# Patient Record
Sex: FEMALE | Race: WHITE | NOT HISPANIC OR LATINO | Employment: UNEMPLOYED | URBAN - NONMETROPOLITAN AREA
[De-identification: names, ages, dates, MRNs, and addresses within clinical notes are randomized per-mention and may not be internally consistent; named-entity substitution may affect disease eponyms.]

---

## 2017-01-20 ENCOUNTER — TELEPHONE (OUTPATIENT)
Dept: NEUROLOGY | Facility: CLINIC | Age: 37
End: 2017-01-20

## 2017-01-20 RX ORDER — TOPIRAMATE 50 MG/1
CAPSULE, EXTENDED RELEASE ORAL
Qty: 30 CAPSULE | Refills: 2 | OUTPATIENT
Start: 2017-01-20

## 2017-01-20 RX ORDER — TOPIRAMATE 100 MG/1
100 CAPSULE, EXTENDED RELEASE ORAL DAILY
Qty: 30 CAPSULE | Refills: 3 | Status: SHIPPED | OUTPATIENT
Start: 2017-01-20 | End: 2017-01-23 | Stop reason: DRUGHIGH

## 2017-01-20 NOTE — TELEPHONE ENCOUNTER
----- Message from MASSIEL Garcia sent at 1/20/2017 10:24 AM EST -----  Please verify is pt using trokendi 100mg or 50mg. The new rx states 50mg but chart states 100mg.  Thank you

## 2017-01-23 ENCOUNTER — PROCEDURE VISIT (OUTPATIENT)
Dept: NEUROLOGY | Facility: CLINIC | Age: 37
End: 2017-01-23

## 2017-01-23 VITALS
BODY MASS INDEX: 30.39 KG/M2 | WEIGHT: 178 LBS | SYSTOLIC BLOOD PRESSURE: 106 MMHG | HEIGHT: 64 IN | DIASTOLIC BLOOD PRESSURE: 90 MMHG

## 2017-01-23 DIAGNOSIS — G43.011 INTRACTABLE MIGRAINE WITHOUT AURA AND WITH STATUS MIGRAINOSUS: Primary | ICD-10-CM

## 2017-01-23 PROCEDURE — 64615 CHEMODENERV MUSC MIGRAINE: CPT | Performed by: PSYCHIATRY & NEUROLOGY

## 2017-01-23 RX ORDER — TOPIRAMATE 50 MG/1
CAPSULE, EXTENDED RELEASE ORAL
Qty: 30 CAPSULE | Refills: 2 | Status: SHIPPED | OUTPATIENT
Start: 2017-01-23 | End: 2017-04-07 | Stop reason: SDUPTHER

## 2017-01-23 NOTE — PROGRESS NOTES
Procedure note    Procedure: Botox injection    Indication: Prior to Botox injection patients suffer from 28 day a month of headache.  Her pain was 10/10.      Procedure:  Patient has over 15 migraines a month over 4 hours duration interfering with the patient daily activities despite conservative medical treatment for acute and preventive measures.    The risk and benefit of the Botox treatment has been discussed with patient.  Safety information and contraindication of Botox has been reviewed with patient.  The most frequent Adverse reactions of Botox for migraine include but not limited to neck pain 9%, headache 5%, Ptosis 4%, muscle weakness 4%, injection site pain 3%, muscle spasms 2% have been gone over with our patient.  200 unit vial Botox was re-constituted with 4 mL 0.9 NS to 5 unit/0.1 mL using standard techniques.  10 units were given at the  muscle in 2 divided sites  5 units were given at the Procerus muscle  20 units were given at the Frontalis muscle in 4 divided sites  40 units were given at the Temporalis muscle in 8 divided sites  30 units were given at the Occipitalis muscle in 6 divided sites  20 units were given at the cervical paraspinal muscle in 4 divided sites  30 units were given at the Trapezius muscle in 6 divided sites  For a total of 155 units divided between 31 sites and 45 units of wastage  Patient tolerated procedure well without complication.      Esa Miller MD, FAAN  01/23/2017

## 2017-01-23 NOTE — MR AVS SNAPSHOT
Ange Wade   2017 10:30 AM   Procedure visit    Dept Phone:  [phone]   Encounter #:  [id number]    Provider:  Esa Miller MD, FAAN   Department:  [facility]                Your Full Care Plan              Today's Medication Changes          These changes are accurate as of: 17 11:08 AM.  If you have any questions, ask your nurse or doctor.               Medication(s)that have changed:     amitriptyline 100 MG tablet   Commonly known as:  ELAVIL   Take 100 mg by mouth Every Night. Take 1 at supper time   What changed:  Another medication with the same name was removed. Continue taking this medication, and follow the directions you see here.                  Your Updated Medication List          This list is accurate as of: 17 11:08 AM.  Always use your most recent med list.                amitriptyline 100 MG tablet   Commonly known as:  ELAVIL       MethylPREDNISolone 4 MG tablet   Commonly known as:  MEDROL (BAR)   Take as directed on package instructions.       SUMAtriptan 100 MG tablet   Commonly known as:  IMITREX   Take 1 tablet by mouth 1 (One) Time As Needed for migraine for up to 1 dose.       Topiramate  MG capsule sustained-release 24 hr   Commonly known as:  TROKENDI XR   Take 100 mg by mouth Daily.               Instructions     None    Patient Instructions History      Upcoming Appointments     Visit Type Date Time Department    IN OFFICE PROCEDURE 2017 10:30 AM [facility]      [employer] Signup     [facility] [employer] allows you to send messages to your doctor, view your test results, renew your prescriptions, schedule appointments, and more. To sign up, go to [employer] and click on the Sign Up Now link in the New User? box. Enter your [employer] Activation Code exactly as it appears below along with the last four digits of your Social Security Number and your Date of Birth () to  "complete the sign-up process. If you do not sign up before the expiration date, you must request a new code.    [employer] Activation Code: [email or web address]  Expires: 1/30/2017  5:32 AM    If you have questions, you can email [email or web address] or call [phone] to talk to our [employer] staff. Remember, [employer] is NOT to be used for urgent needs. For medical emergencies, dial 911.               Other Info from Your Visit           Allergies     Darvon [Propoxyphene]  Hives    darvocet      Reason for Visit     Botulinum Toxin Injection reports no reduction in migraines this time. states that she continues to have lt eye throbbing and pain with migraine. reports that frequency, intensitity and duration are all unchanged.    Med Management states that the paranoia and hallucinations continue. states that she would like to decrease trokendi to 50mg qd to see if this will help.decreasing amitrip did decrease the hallucinations.      Vital Signs     Blood Pressure Height Weight Body Mass Index Smoking Status       106/90 64\" (162.6 cm) 178 lb (80.7 kg) 30.55 kg/m2 Former Smoker         "

## 2017-02-22 DIAGNOSIS — G43.001 MIGRAINE WITHOUT AURA AND WITH STATUS MIGRAINOSUS, NOT INTRACTABLE: ICD-10-CM

## 2017-02-22 RX ORDER — AMITRIPTYLINE HYDROCHLORIDE 100 MG/1
TABLET, FILM COATED ORAL
Qty: 60 TABLET | Refills: 3 | Status: SHIPPED | OUTPATIENT
Start: 2017-02-22 | End: 2017-06-10 | Stop reason: SDUPTHER

## 2017-03-01 ENCOUNTER — OFFICE VISIT (OUTPATIENT)
Dept: OBSTETRICS AND GYNECOLOGY | Facility: CLINIC | Age: 37
End: 2017-03-01

## 2017-03-01 VITALS
BODY MASS INDEX: 30.9 KG/M2 | HEIGHT: 64 IN | SYSTOLIC BLOOD PRESSURE: 119 MMHG | WEIGHT: 181 LBS | DIASTOLIC BLOOD PRESSURE: 62 MMHG

## 2017-03-01 DIAGNOSIS — N94.10 DYSPAREUNIA IN FEMALE: ICD-10-CM

## 2017-03-01 DIAGNOSIS — N83.201 CYST OF RIGHT OVARY: ICD-10-CM

## 2017-03-01 DIAGNOSIS — R10.31 ABDOMINAL PAIN, RIGHT LOWER QUADRANT: Primary | ICD-10-CM

## 2017-03-01 LAB
COLOR UR: YELLOW
GLUCOSE UR STRIP-MCNC: NEGATIVE MG/DL
LEUKOCYTE EST, POC: NEGATIVE
NITRITE UR-MCNC: NEGATIVE MG/ML
PROT UR STRIP-MCNC: NEGATIVE MG/DL
RBC # UR STRIP: NEGATIVE /UL

## 2017-03-01 PROCEDURE — 99213 OFFICE O/P EST LOW 20 MIN: CPT | Performed by: OBSTETRICS & GYNECOLOGY

## 2017-03-01 PROCEDURE — 76830 TRANSVAGINAL US NON-OB: CPT | Performed by: OBSTETRICS & GYNECOLOGY

## 2017-03-01 PROCEDURE — 81002 URINALYSIS NONAUTO W/O SCOPE: CPT | Performed by: OBSTETRICS & GYNECOLOGY

## 2017-03-01 RX ORDER — SUMATRIPTAN 100 MG/1
100 TABLET, FILM COATED ORAL
COMMUNITY
End: 2017-04-12 | Stop reason: SDUPTHER

## 2017-03-01 NOTE — PROGRESS NOTES
Subjective  Chief Complaint   Patient presents with   • painful intercourse   • Pelvic Pain     pain in right side x 1 week     Patient is 36 y.o.  here for evaluation of right lower quadrant pain.  Pt reports onset of pain with intercourse and has cont to have pain.  Pt reports sharp stabbing pain.  Pt reports not improving in approx 1 week.  Pt has cont to have dyspareunia as well.  Pt with nausea but only with migraines.  Pt with no emesis.  Pt denies any changes with bms.  Pt with no fever or chills.  Pt with no vaginal discharge, itching or burning.  Pt had LAVH 2016 and still has both ovaries.  Pt has some hot flashes but no other symptoms.    History  Past Medical History   Diagnosis Date   • History of blood clots      after c section    • History of CT scan    • History of MRI    • Migraine    • Vasovagal syncope      Current Outpatient Prescriptions on File Prior to Visit   Medication Sig Dispense Refill   • amitriptyline (ELAVIL) 100 MG tablet TAKE 2 TABLETS BY MOUTH AT BEDTIME  60 tablet 3   • TROKENDI XR 50 MG capsule sustained-release 24 hr TAKE 1 CAPSULE BY MOUTH ONE TIME A DAY  30 capsule 2   • MethylPREDNISolone (MEDROL, BAR,) 4 MG tablet Take as directed on package instructions. 1 each 0   • [DISCONTINUED] amitriptyline (ELAVIL) 100 MG tablet Take 100 mg by mouth Every Night. Take 1 at supper time     • [DISCONTINUED] SUMAtriptan (IMITREX) 100 MG tablet Take 1 tablet by mouth 1 (One) Time As Needed for migraine for up to 1 dose. 15 tablet 3     No current facility-administered medications on file prior to visit.      Allergies   Allergen Reactions   • Darvon [Propoxyphene] Hives     darvocet     Past Surgical History   Procedure Laterality Date   •  section     •  section     •  section     • Toe surgery     • Mouth surgery     • Tubal abdominal ligation     • Hysterectomy  2016     LUPE   • Bladder repair  2016     with hysterectomy  "    Family History   Problem Relation Age of Onset   • Mental illness Other    • Cancer Other    • Diabetes Other    • Hypertension Other    • Heart disease Other    • Stroke Other    • Breast cancer Maternal Grandmother    • Breast cancer Maternal Aunt      Social History     Social History   • Marital status:      Spouse name: N/A   • Number of children: N/A   • Years of education: N/A     Social History Main Topics   • Smoking status: Former Smoker   • Smokeless tobacco: Never Used   • Alcohol use No   • Drug use: No   • Sexual activity: Yes     Partners: Male     Birth control/ protection: Surgical     Other Topics Concern   • None     Social History Narrative     Review of Systems  All systems were reviewed and negative except for:  Constitution:  positive for chills, trouble sleeping and hot flashes  Genitourinary: postivie for  See HPI    Objective  Vitals:    03/01/17 1403   BP: 119/62   Weight: 181 lb (82.1 kg)   Height: 64\" (162.6 cm)     Physical Exam:  General Appearance: alert, appears stated age and cooperative  Head: normocephalic, without obvious abnormality and atraumatic  Eyes: lids and lashes normal, conjunctivae and sclerae normal, no icterus, no pallor and corneas clear  Ears: ears appear intact with no abnormalities noted  Neck: suppple, trachea midline and no thyromegaly  Lungs: clear to auscultation, respirations regular, respirations even and respirations unlabored  Heart: regular rhythm and normal rate, normal S1, S2, no murmur, gallop, or rubs and no click  Abdomen: normal bowel sounds, no masses, no hepatomegaly, no splenomegaly, soft non-tender, no guarding and no rebound tenderness  Pelvic: Clinical staff was present for exam  External genitalia:  normal appearance of the external genitalia including Bartholin's and [place]'s glands.  :  urethral meatus normal; urethral hypermobility is absent.  Vaginal:  normal pink mucosa without prolapse or lesions.  Cervix:  absent.  Uterus:  " absent.  Adnexa:  +tenderness right adnexal area  Extremities: moves extremities well, no edema, no cyanosis and no redness  Skin: no bleeding, bruising or rash and no leasions noted  Psych: normal mood and affect, oriented to person, time and place, thought content organized and appropriate judgment    Lab Review   UA - negative    Imaging   Pelvic ultrasound images independantly reviewed - right ovary with multiple cysts; largest 4 cm; no free fluid; normal doppler flow.    Assessment/Plan    Problem List Items Addressed This Visit     None      Visit Diagnoses     Abdominal pain, right lower quadrant    -  Primary    Relevant Orders    US Non-ob Transvaginal  Right multicystic ovary; instructions and precautions given.  Pelvic rest.  Plan repeat scan in 6 wks.    POC Urinalysis Dipstick (Completed)    Dyspareunia in female        Relevant Orders    US Non-ob Transvaginal    POC Urinalysis Dipstick (Completed)          Follow up 6 weeks for annual exam and for ultrasound    This note was electronically signed.  Jyothi Ramos M.D.

## 2017-03-16 DIAGNOSIS — G43.001 MIGRAINE WITHOUT AURA AND WITH STATUS MIGRAINOSUS, NOT INTRACTABLE: ICD-10-CM

## 2017-03-16 RX ORDER — SUMATRIPTAN 100 MG/1
TABLET, FILM COATED ORAL
Qty: 15 TABLET | Refills: 2 | Status: SHIPPED | OUTPATIENT
Start: 2017-03-16 | End: 2017-05-12 | Stop reason: SDUPTHER

## 2017-03-26 ENCOUNTER — HOSPITAL ENCOUNTER (EMERGENCY)
Facility: HOSPITAL | Age: 37
Discharge: HOME | End: 2017-03-26
Attending: EMERGENCY MEDICINE | Admitting: EMERGENCY MEDICINE

## 2017-03-26 ENCOUNTER — APPOINTMENT (OUTPATIENT)
Dept: CT IMAGING | Facility: HOSPITAL | Age: 37
End: 2017-03-26

## 2017-03-26 VITALS
HEART RATE: 58 BPM | WEIGHT: 181 LBS | HEIGHT: 63 IN | SYSTOLIC BLOOD PRESSURE: 101 MMHG | OXYGEN SATURATION: 100 % | RESPIRATION RATE: 16 BRPM | DIASTOLIC BLOOD PRESSURE: 76 MMHG | TEMPERATURE: 98 F | BODY MASS INDEX: 32.07 KG/M2

## 2017-03-26 DIAGNOSIS — R10.30 LOWER ABDOMINAL PAIN: Primary | ICD-10-CM

## 2017-03-26 LAB
ALBUMIN SERPL-MCNC: 4.1 G/DL (ref 3.5–5)
ALBUMIN/GLOB SERPL: 1.1 G/DL (ref 1–2)
ALP SERPL-CCNC: 66 U/L (ref 38–126)
ALT SERPL W P-5'-P-CCNC: 29 U/L (ref 13–69)
ANION GAP SERPL CALCULATED.3IONS-SCNC: 18.2 MMOL/L
AST SERPL-CCNC: 21 U/L (ref 15–46)
BASOPHILS # BLD AUTO: 0.05 10*3/MM3 (ref 0–0.2)
BASOPHILS NFR BLD AUTO: 0.8 % (ref 0–2.5)
BILIRUB SERPL-MCNC: 0.6 MG/DL (ref 0.2–1.3)
BILIRUB UR QL STRIP: NEGATIVE
BUN BLD-MCNC: 12 MG/DL (ref 7–20)
BUN/CREAT SERPL: 20 (ref 7.1–23.5)
CALCIUM SPEC-SCNC: 8.8 MG/DL (ref 8.4–10.2)
CHLORIDE SERPL-SCNC: 105 MMOL/L (ref 98–107)
CLARITY UR: CLEAR
CO2 SERPL-SCNC: 24 MMOL/L (ref 26–30)
COLOR UR: YELLOW
CREAT BLD-MCNC: 0.6 MG/DL (ref 0.6–1.3)
DEPRECATED RDW RBC AUTO: 41.1 FL (ref 37–54)
EOSINOPHIL # BLD AUTO: 0.08 10*3/MM3 (ref 0–0.7)
EOSINOPHIL NFR BLD AUTO: 1.3 % (ref 0–7)
ERYTHROCYTE [DISTWIDTH] IN BLOOD BY AUTOMATED COUNT: 14.1 % (ref 11.5–14.5)
GFR SERPL CREATININE-BSD FRML MDRD: 113 ML/MIN/1.73
GLOBULIN UR ELPH-MCNC: 3.7 GM/DL
GLUCOSE BLD-MCNC: 88 MG/DL (ref 74–98)
GLUCOSE UR STRIP-MCNC: NEGATIVE MG/DL
HCT VFR BLD AUTO: 41.4 % (ref 37–47)
HGB BLD-MCNC: 13.5 G/DL (ref 12–16)
HGB UR QL STRIP.AUTO: NEGATIVE
HOLD SPECIMEN: NORMAL
HOLD SPECIMEN: NORMAL
IMM GRANULOCYTES # BLD: 0.02 10*3/MM3 (ref 0–0.06)
IMM GRANULOCYTES NFR BLD: 0.3 % (ref 0–0.6)
KETONES UR QL STRIP: NEGATIVE
LEUKOCYTE ESTERASE UR QL STRIP.AUTO: NEGATIVE
LIPASE SERPL-CCNC: 87 U/L (ref 23–300)
LYMPHOCYTES # BLD AUTO: 1.95 10*3/MM3 (ref 0.6–3.4)
LYMPHOCYTES NFR BLD AUTO: 30.9 % (ref 10–50)
MCH RBC QN AUTO: 26.6 PG (ref 27–31)
MCHC RBC AUTO-ENTMCNC: 32.6 G/DL (ref 30–37)
MCV RBC AUTO: 81.7 FL (ref 81–99)
MONOCYTES # BLD AUTO: 0.4 10*3/MM3 (ref 0–0.9)
MONOCYTES NFR BLD AUTO: 6.3 % (ref 0–12)
NEUTROPHILS # BLD AUTO: 3.81 10*3/MM3 (ref 2–6.9)
NEUTROPHILS NFR BLD AUTO: 60.4 % (ref 37–80)
NITRITE UR QL STRIP: NEGATIVE
NRBC BLD MANUAL-RTO: 0 /100 WBC (ref 0–0)
PH UR STRIP.AUTO: 7 [PH] (ref 5–8)
PLATELET # BLD AUTO: 173 10*3/MM3 (ref 130–400)
PMV BLD AUTO: 11.9 FL (ref 6–12)
POTASSIUM BLD-SCNC: 4.2 MMOL/L (ref 3.5–5.1)
PROT SERPL-MCNC: 7.8 G/DL (ref 6.3–8.2)
PROT UR QL STRIP: NEGATIVE
RBC # BLD AUTO: 5.07 10*6/MM3 (ref 4.2–5.4)
SODIUM BLD-SCNC: 143 MMOL/L (ref 137–145)
SP GR UR STRIP: 1.01 (ref 1–1.03)
UROBILINOGEN UR QL STRIP: NORMAL
WBC NRBC COR # BLD: 6.31 10*3/MM3 (ref 4.8–10.8)
WHOLE BLOOD HOLD SPECIMEN: NORMAL
WHOLE BLOOD HOLD SPECIMEN: NORMAL

## 2017-03-26 PROCEDURE — 99283 EMERGENCY DEPT VISIT LOW MDM: CPT

## 2017-03-26 PROCEDURE — 74176 CT ABD & PELVIS W/O CONTRAST: CPT

## 2017-03-26 PROCEDURE — 96361 HYDRATE IV INFUSION ADD-ON: CPT

## 2017-03-26 PROCEDURE — 85025 COMPLETE CBC W/AUTO DIFF WBC: CPT | Performed by: EMERGENCY MEDICINE

## 2017-03-26 PROCEDURE — 80053 COMPREHEN METABOLIC PANEL: CPT | Performed by: EMERGENCY MEDICINE

## 2017-03-26 PROCEDURE — 96374 THER/PROPH/DIAG INJ IV PUSH: CPT

## 2017-03-26 PROCEDURE — 25010000002 KETOROLAC TROMETHAMINE PER 15 MG: Performed by: PHYSICIAN ASSISTANT

## 2017-03-26 PROCEDURE — 81003 URINALYSIS AUTO W/O SCOPE: CPT | Performed by: EMERGENCY MEDICINE

## 2017-03-26 PROCEDURE — 83690 ASSAY OF LIPASE: CPT | Performed by: EMERGENCY MEDICINE

## 2017-03-26 RX ORDER — HYDROCODONE BITARTRATE AND ACETAMINOPHEN 5; 325 MG/1; MG/1
1 TABLET ORAL EVERY 6 HOURS PRN
Qty: 12 TABLET | Refills: 0 | Status: SHIPPED | OUTPATIENT
Start: 2017-03-26 | End: 2017-04-29 | Stop reason: HOSPADM

## 2017-03-26 RX ORDER — KETOROLAC TROMETHAMINE 30 MG/ML
30 INJECTION, SOLUTION INTRAMUSCULAR; INTRAVENOUS ONCE
Status: COMPLETED | OUTPATIENT
Start: 2017-03-26 | End: 2017-03-26

## 2017-03-26 RX ORDER — IBUPROFEN 600 MG/1
600 TABLET ORAL ONCE
Status: DISCONTINUED | OUTPATIENT
Start: 2017-03-26 | End: 2017-03-26

## 2017-03-26 RX ORDER — IBUPROFEN 600 MG/1
600 TABLET ORAL EVERY 8 HOURS PRN
Qty: 12 TABLET | Refills: 0 | Status: SHIPPED | OUTPATIENT
Start: 2017-03-26 | End: 2017-04-29 | Stop reason: HOSPADM

## 2017-03-26 RX ORDER — SODIUM CHLORIDE 0.9 % (FLUSH) 0.9 %
10 SYRINGE (ML) INJECTION AS NEEDED
Status: DISCONTINUED | OUTPATIENT
Start: 2017-03-26 | End: 2017-03-26 | Stop reason: HOSPADM

## 2017-03-26 RX ORDER — ONDANSETRON 4 MG/1
4 TABLET, FILM COATED ORAL EVERY 6 HOURS
Qty: 8 TABLET | Refills: 0 | Status: SHIPPED | OUTPATIENT
Start: 2017-03-26 | End: 2017-04-17

## 2017-03-26 RX ORDER — ACETAMINOPHEN 325 MG/1
650 TABLET ORAL ONCE
Status: COMPLETED | OUTPATIENT
Start: 2017-03-26 | End: 2017-03-26

## 2017-03-26 RX ORDER — ONDANSETRON 4 MG/1
4 TABLET, ORALLY DISINTEGRATING ORAL ONCE
Status: COMPLETED | OUTPATIENT
Start: 2017-03-26 | End: 2017-03-26

## 2017-03-26 RX ADMIN — KETOROLAC TROMETHAMINE 30 MG: 30 INJECTION, SOLUTION INTRAMUSCULAR at 11:10

## 2017-03-26 RX ADMIN — SODIUM CHLORIDE 500 ML: 9 INJECTION, SOLUTION INTRAVENOUS at 11:10

## 2017-03-26 RX ADMIN — ACETAMINOPHEN 650 MG: 325 TABLET, FILM COATED ORAL at 11:11

## 2017-03-26 RX ADMIN — ONDANSETRON 4 MG: 4 TABLET, ORALLY DISINTEGRATING ORAL at 11:10

## 2017-03-26 NOTE — ED PROVIDER NOTES
Subjective   HPI Comments: Patient assures complaint of right flank pain symptoms increased for the past month no fever chills , she describes nausea vomiting ×2 this morning no chest pain shortness of air patient is had hysterectomy patient is been told she has ovarian cysts in the past uncertain if this is what is causing pain presents here for further evaluation      Review of Systems   Constitutional: Negative.  Negative for fever.   HENT: Negative.    Respiratory: Negative.    Gastrointestinal: Positive for nausea. Vomiting: vomit ×2.   Genitourinary: Positive for flank pain.   Musculoskeletal: Positive for back pain. Negative for arthralgias.   Skin: Negative.  Negative for rash.   Neurological: Negative.  Negative for numbness.   Hematological: Negative.    Psychiatric/Behavioral: Negative.    All other systems reviewed and are negative.      Past Medical History:   Diagnosis Date   • History of blood clots     after c section    • History of CT scan    • History of MRI    • Migraine    • Vasovagal syncope        Allergies   Allergen Reactions   • Darvon [Propoxyphene] Hives     darvocet       Past Surgical History:   Procedure Laterality Date   • BLADDER REPAIR      with hysterectomy   •  SECTION     •  SECTION     •  SECTION     • HYSTERECTOMY  2016    LUPE   • MOUTH SURGERY     • TOE SURGERY     • TUBAL ABDOMINAL LIGATION         Family History   Problem Relation Age of Onset   • Mental illness Other    • Cancer Other    • Diabetes Other    • Hypertension Other    • Heart disease Other    • Stroke Other    • Breast cancer Maternal Grandmother    • Breast cancer Maternal Aunt        Social History     Social History   • Marital status:      Spouse name: N/A   • Number of children: N/A   • Years of education: N/A     Social History Main Topics   • Smoking status: Former Smoker   • Smokeless tobacco: Never Used   • Alcohol use No   • Drug use: No    • Sexual activity: Yes     Partners: Male     Birth control/ protection: Surgical     Other Topics Concern   • None     Social History Narrative           Objective   Physical Exam   Constitutional: She is oriented to person, place, and time. She appears well-developed and well-nourished.   Afebrile vital signs stable nontoxic well-appearing  ambulatory in the exam room   HENT:   Head: Normocephalic.   Eyes: EOM are normal. Pupils are equal, round, and reactive to light.   Neck: Normal range of motion. Neck supple.   Cardiovascular: Normal rate and regular rhythm.    Pulmonary/Chest: Effort normal and breath sounds normal.   Abdominal: Soft. She exhibits no distension. There is no rebound and no guarding.   Mild right CVA tenderness   Musculoskeletal: Normal range of motion.   Neurological: She is alert and oriented to person, place, and time.   Skin: Skin is warm and dry. No rash noted.   Psychiatric: She has a normal mood and affect. Her behavior is normal. Judgment and thought content normal.   Nursing note and vitals reviewed.      Procedures         ED Course  ED Course   Comment By Time   Patient resting comfortably no distress Walker Ashley PA-C 03/26 1228   Patient sitting in room no acute distress discussed with patient CT results and labs patient does have follow-up with Dr. Jyothi Ramos advised to try to call tomorrow to work in sooner, certainly return here if she has any sudden changes worsening pain in the next 24 hours Walker Ashley PA-C 03/26 1231                  MDM    Final diagnoses:   Lower abdominal pain            Walker Ashley PA-C  03/26/17 1235

## 2017-04-07 RX ORDER — TOPIRAMATE 50 MG/1
50 CAPSULE, EXTENDED RELEASE ORAL DAILY
Qty: 30 CAPSULE | Refills: 4 | Status: SHIPPED | OUTPATIENT
Start: 2017-04-07 | End: 2017-07-24 | Stop reason: SDUPTHER

## 2017-04-12 ENCOUNTER — OFFICE VISIT (OUTPATIENT)
Dept: OBSTETRICS AND GYNECOLOGY | Facility: CLINIC | Age: 37
End: 2017-04-12

## 2017-04-12 VITALS
DIASTOLIC BLOOD PRESSURE: 58 MMHG | BODY MASS INDEX: 32.43 KG/M2 | HEIGHT: 63 IN | SYSTOLIC BLOOD PRESSURE: 114 MMHG | WEIGHT: 183 LBS

## 2017-04-12 DIAGNOSIS — N83.201 RIGHT OVARIAN CYST: Primary | ICD-10-CM

## 2017-04-12 DIAGNOSIS — R10.2 PELVIC PAIN: ICD-10-CM

## 2017-04-12 PROCEDURE — 99214 OFFICE O/P EST MOD 30 MIN: CPT | Performed by: OBSTETRICS & GYNECOLOGY

## 2017-04-12 PROCEDURE — 76830 TRANSVAGINAL US NON-OB: CPT | Performed by: OBSTETRICS & GYNECOLOGY

## 2017-04-12 RX ORDER — PHENAZOPYRIDINE HYDROCHLORIDE 100 MG/1
200 TABLET, FILM COATED ORAL ONCE
Status: CANCELLED | OUTPATIENT
Start: 2017-04-12 | End: 2017-04-12

## 2017-04-12 RX ORDER — SODIUM CHLORIDE 0.9 % (FLUSH) 0.9 %
1-10 SYRINGE (ML) INJECTION AS NEEDED
Status: CANCELLED | OUTPATIENT
Start: 2017-04-12

## 2017-04-13 NOTE — PROGRESS NOTES
Subjective  Chief Complaint   Patient presents with   • Follow-up     transvaginal ultrasound, Right lower quadrant pain     Patient is 36 y.o.  here for evaluation of cont right lower quadrant pain and f/u TVS.  Pt previously seen and TVS performed 3/1/2017 showing right ovary with multiple cysts; largest 4 cm; and 2 small simple cysts of left ovary.  Pt reports pain cont and worsened to the point of going to the ER on 3/26; records reviewed as well as labs and CT scan; WBC 6.31; cmp normal; lipase normal; UA negative; CT scan of abd/pelvis showing no hydronephrosis or nephrolithiasis; no acute process; no mention of ovarian cyst.  Pt reports pain has cont to worsen since then.  Pt denies any nausea, emesis, change in bm's or voiding.  Pt with no fever or chills.  Pt with history of adhesive disease.  Pt had previous LUPE with incidental cystotomy.  Pt desires removal of ovaries and tubes but is insistent upon laparotomy rather than laparoscopy.    History  Past Medical History:   Diagnosis Date   • Abnormal ECG ?    [facility]   • Abnormal Pap smear of cervix     Precancerous cells   • Anemia    • History of blood clots     after c section    • History of CT scan    • History of MRI    • Migraine    • Ovarian cyst     Had uterus removed 2015   • PMS (premenstrual syndrome)    • Preeclampsia 1-    During my 1st pregnacy   • Urinary tract infection     Only had during last pregnacy   • Varicella    • Vasovagal syncope      Current Outpatient Prescriptions on File Prior to Visit   Medication Sig Dispense Refill   • amitriptyline (ELAVIL) 100 MG tablet TAKE 2 TABLETS BY MOUTH AT BEDTIME  60 tablet 3   • HYDROcodone-acetaminophen (NORCO) 5-325 MG per tablet Take 1 tablet by mouth Every 6 (Six) Hours As Needed for Mild Pain (1-3). 12 tablet 0   • ibuprofen (ADVIL,MOTRIN) 600 MG tablet Take 1 tablet by mouth Every 8 (Eight) Hours As Needed for Mild Pain (1-3).  12 tablet 0   • ondansetron (ZOFRAN) 4 MG tablet Take 1 tablet by mouth Every 6 (Six) Hours. 8 tablet 0   • SUMAtriptan (IMITREX) 100 MG tablet take 1 tablet by mouth one time as needed for up to 1 dose for migraine. 15 tablet 2   • Topiramate ER (TROKENDI XR) 50 MG capsule sustained-release 24 hr Take 50 mg by mouth Daily. 30 capsule 4   • MethylPREDNISolone (MEDROL, BAR,) 4 MG tablet Take as directed on package instructions. 1 each 0     No current facility-administered medications on file prior to visit.      Allergies   Allergen Reactions   • Darvon [Propoxyphene] Hives     darvocet     Past Surgical History:   Procedure Laterality Date   • BLADDER REPAIR      with hysterectomy   •  SECTION     •  SECTION     •  SECTION     • HYSTERECTOMY  2016    LUPE   • MOUTH SURGERY     • TOE SURGERY     • TONSILLECTOMY  82 or 83   • TUBAL ABDOMINAL LIGATION     • WISDOM TOOTH EXTRACTION  3-     Family History   Problem Relation Age of Onset   • Mental illness Other    • Cancer Other    • Diabetes Other    • Hypertension Other    • Heart disease Other    • Stroke Other    • Breast cancer Maternal Grandmother    • Ovarian cancer Maternal Grandmother    • Stroke Maternal Grandmother    • Diabetes Maternal Grandmother    • Breast cancer Maternal Aunt    • Stroke Paternal Grandmother    • Diabetes Paternal Grandmother      Social History     Social History   • Marital status:      Spouse name: N/A   • Number of children: N/A   • Years of education: N/A     Social History Main Topics   • Smoking status: Former Smoker     Quit date: 1996   • Smokeless tobacco: Never Used   • Alcohol use No   • Drug use: No   • Sexual activity: Yes     Partners: Male     Birth control/ protection: Surgical     Other Topics Concern   • None     Social History Narrative     Review of Systems  All systems were reviewed and negative except for:  Genitourinary: postivie for  See  "HPI    Objective  Vitals:    04/12/17 1355   BP: 114/58   Weight: 183 lb (83 kg)   Height: 63\" (160 cm)     Physical Exam:  General Appearance: alert, appears stated age and cooperative  Head: normocephalic, without obvious abnormality and atraumatic  Eyes: lids and lashes normal, conjunctivae and sclerae normal, no icterus, no pallor and corneas clear  Neck: suppple, trachea midline and no thyromegaly  Lungs: clear to auscultation, respirations regular, respirations even and respirations unlabored  Heart: regular rhythm and normal rate, normal S1, S2, no murmur, gallop, or rubs and no click  Abdomen: normal bowel sounds, no masses, no hepatomegaly, no splenomegaly, soft non-tender, no guarding and no rebound tenderness  Extremities: moves extremities well, no edema, no cyanosis and no redness  Skin: no bleeding, bruising or rash and no lesions noted  Psych: normal mood and affect, oriented to person, time and place, thought content organized and appropriate judgment    Lab Review   BMP, CBC, CMP, UA and Urine culture    Imaging   CT of abdomen/pelvis report  Pelvic ultrasound images independantly reviewed - TVS today shows right simple cyst now measuring 4.7 cm with thin septation; left ovary normal; small amount of free fluid.    Assessment/Plan    Problem List Items Addressed This Visit     None      Visit Diagnoses     Right ovarian cyst    -  Primary  Pt with cont pelvic pain and persistent right ovarian cyst.  Pt desires removal of both ovaries and tubes.  Long discussion with patient regarding risks, complications, benefits, and other alternatives.  Discussed laparoscopy vs laparotomy but pt insistent upon laparotomy.  Also discussed increased risk of complications requiring additional surgery given previous history.  Also discussed possible need for HRT given patient's age.  All questions answered.  Pt desires to schedule the above.    Relevant Orders    Case Request (Completed)    Instruct Patient on " Coughing, Deep Breathing and Incentive Spirometry    CBC and Differential    Urinalysis w/Culture if Indicated    Type and screen    Pelvic pain              Follow up as scheduled    This note was electronically signed.  Jyothi Ramos M.D.

## 2017-04-17 ENCOUNTER — RESULTS ENCOUNTER (OUTPATIENT)
Dept: OBSTETRICS AND GYNECOLOGY | Facility: CLINIC | Age: 37
End: 2017-04-17

## 2017-04-17 ENCOUNTER — APPOINTMENT (OUTPATIENT)
Dept: PREADMISSION TESTING | Facility: HOSPITAL | Age: 37
End: 2017-04-17

## 2017-04-17 VITALS — WEIGHT: 180 LBS | HEIGHT: 63 IN | BODY MASS INDEX: 31.89 KG/M2

## 2017-04-17 DIAGNOSIS — N83.201 RIGHT OVARIAN CYST: ICD-10-CM

## 2017-04-17 LAB
ANION GAP SERPL CALCULATED.3IONS-SCNC: 11.7 MMOL/L
BASOPHILS # BLD AUTO: 0.05 10*3/MM3 (ref 0–0.2)
BASOPHILS NFR BLD AUTO: 0.9 % (ref 0–2.5)
BILIRUB UR QL STRIP: NEGATIVE
BUN BLD-MCNC: 11 MG/DL (ref 7–20)
BUN/CREAT SERPL: 15.7 (ref 7.1–23.5)
CALCIUM SPEC-SCNC: 8.6 MG/DL (ref 8.4–10.2)
CHLORIDE SERPL-SCNC: 108 MMOL/L (ref 98–107)
CLARITY UR: CLEAR
CO2 SERPL-SCNC: 26 MMOL/L (ref 26–30)
COLOR UR: YELLOW
CREAT BLD-MCNC: 0.7 MG/DL (ref 0.6–1.3)
DEPRECATED RDW RBC AUTO: 41.2 FL (ref 37–54)
EOSINOPHIL # BLD AUTO: 0.11 10*3/MM3 (ref 0–0.7)
EOSINOPHIL NFR BLD AUTO: 1.9 % (ref 0–7)
ERYTHROCYTE [DISTWIDTH] IN BLOOD BY AUTOMATED COUNT: 14 % (ref 11.5–14.5)
GFR SERPL CREATININE-BSD FRML MDRD: 95 ML/MIN/1.73
GLUCOSE BLD-MCNC: 83 MG/DL (ref 74–98)
GLUCOSE UR STRIP-MCNC: NEGATIVE MG/DL
HCT VFR BLD AUTO: 39.2 % (ref 37–47)
HGB BLD-MCNC: 12.7 G/DL (ref 12–16)
HGB UR QL STRIP.AUTO: NEGATIVE
IMM GRANULOCYTES # BLD: 0.01 10*3/MM3 (ref 0–0.06)
IMM GRANULOCYTES NFR BLD: 0.2 % (ref 0–0.6)
KETONES UR QL STRIP: NEGATIVE
LEUKOCYTE ESTERASE UR QL STRIP.AUTO: NEGATIVE
LYMPHOCYTES # BLD AUTO: 1.94 10*3/MM3 (ref 0.6–3.4)
LYMPHOCYTES NFR BLD AUTO: 33.3 % (ref 10–50)
MCH RBC QN AUTO: 26.6 PG (ref 27–31)
MCHC RBC AUTO-ENTMCNC: 32.4 G/DL (ref 30–37)
MCV RBC AUTO: 82 FL (ref 81–99)
MONOCYTES # BLD AUTO: 0.35 10*3/MM3 (ref 0–0.9)
MONOCYTES NFR BLD AUTO: 6 % (ref 0–12)
NEUTROPHILS # BLD AUTO: 3.37 10*3/MM3 (ref 2–6.9)
NEUTROPHILS NFR BLD AUTO: 57.7 % (ref 37–80)
NITRITE UR QL STRIP: NEGATIVE
NRBC BLD MANUAL-RTO: 0 /100 WBC (ref 0–0)
PH UR STRIP.AUTO: 5.5 [PH] (ref 5–8)
PLATELET # BLD AUTO: 242 10*3/MM3 (ref 130–400)
PMV BLD AUTO: 11.4 FL (ref 6–12)
POTASSIUM BLD-SCNC: 3.7 MMOL/L (ref 3.5–5.1)
PROT UR QL STRIP: NEGATIVE
RBC # BLD AUTO: 4.78 10*6/MM3 (ref 4.2–5.4)
SODIUM BLD-SCNC: 142 MMOL/L (ref 137–145)
SP GR UR STRIP: 1.02 (ref 1–1.03)
UROBILINOGEN UR QL STRIP: NORMAL
WBC NRBC COR # BLD: 5.83 10*3/MM3 (ref 4.8–10.8)

## 2017-04-17 PROCEDURE — 85025 COMPLETE CBC W/AUTO DIFF WBC: CPT | Performed by: OBSTETRICS & GYNECOLOGY

## 2017-04-17 PROCEDURE — 36415 COLL VENOUS BLD VENIPUNCTURE: CPT

## 2017-04-17 PROCEDURE — 81003 URINALYSIS AUTO W/O SCOPE: CPT

## 2017-04-17 PROCEDURE — 80048 BASIC METABOLIC PNL TOTAL CA: CPT

## 2017-04-17 PROCEDURE — 93005 ELECTROCARDIOGRAM TRACING: CPT | Performed by: OBSTETRICS & GYNECOLOGY

## 2017-04-23 PROCEDURE — S0260 H&P FOR SURGERY: HCPCS | Performed by: OBSTETRICS & GYNECOLOGY

## 2017-04-24 ENCOUNTER — APPOINTMENT (OUTPATIENT)
Dept: GENERAL RADIOLOGY | Facility: HOSPITAL | Age: 37
End: 2017-04-24
Attending: OBSTETRICS & GYNECOLOGY

## 2017-04-24 ENCOUNTER — ANESTHESIA EVENT (OUTPATIENT)
Dept: PERIOP | Facility: HOSPITAL | Age: 37
End: 2017-04-24

## 2017-04-24 ENCOUNTER — ANESTHESIA (OUTPATIENT)
Dept: PERIOP | Facility: HOSPITAL | Age: 37
End: 2017-04-24

## 2017-04-24 ENCOUNTER — HOSPITAL ENCOUNTER (INPATIENT)
Facility: HOSPITAL | Age: 37
LOS: 5 days | Discharge: HOME | End: 2017-04-29
Attending: OBSTETRICS & GYNECOLOGY | Admitting: OBSTETRICS & GYNECOLOGY

## 2017-04-24 DIAGNOSIS — N83.201 RIGHT OVARIAN CYST: ICD-10-CM

## 2017-04-24 LAB
ABO GROUP BLD: NORMAL
ABO GROUP BLD: NORMAL
BLD GP AB SCN SERPL QL: NEGATIVE
RH BLD: POSITIVE
RH BLD: POSITIVE

## 2017-04-24 PROCEDURE — 86901 BLOOD TYPING SEROLOGIC RH(D): CPT

## 2017-04-24 PROCEDURE — 0DNE0ZZ RELEASE LARGE INTESTINE, OPEN APPROACH: ICD-10-PCS | Performed by: SURGERY

## 2017-04-24 PROCEDURE — 44950 APPENDECTOMY: CPT | Performed by: SURGERY

## 2017-04-24 PROCEDURE — 0UT20ZZ RESECTION OF BILATERAL OVARIES, OPEN APPROACH: ICD-10-PCS | Performed by: OBSTETRICS & GYNECOLOGY

## 2017-04-24 PROCEDURE — 74400 UROGRAPHY IV +-KUB TOMOG: CPT

## 2017-04-24 PROCEDURE — 25010000002 ONDANSETRON PER 1 MG: Performed by: NURSE ANESTHETIST, CERTIFIED REGISTERED

## 2017-04-24 PROCEDURE — 86901 BLOOD TYPING SEROLOGIC RH(D): CPT | Performed by: OBSTETRICS & GYNECOLOGY

## 2017-04-24 PROCEDURE — 25010000002 FENTANYL CITRATE (PF) 250 MCG/5ML SOLUTION: Performed by: NURSE ANESTHETIST, CERTIFIED REGISTERED

## 2017-04-24 PROCEDURE — 25010000002 DEXAMETHASONE PER 1 MG: Performed by: NURSE ANESTHETIST, CERTIFIED REGISTERED

## 2017-04-24 PROCEDURE — 25010000002 KETOROLAC TROMETHAMINE PER 15 MG: Performed by: NURSE ANESTHETIST, CERTIFIED REGISTERED

## 2017-04-24 PROCEDURE — 0DTJ0ZZ RESECTION OF APPENDIX, OPEN APPROACH: ICD-10-PCS | Performed by: SURGERY

## 2017-04-24 PROCEDURE — 58720 REMOVAL OF OVARY/TUBE(S): CPT | Performed by: OBSTETRICS & GYNECOLOGY

## 2017-04-24 PROCEDURE — 86900 BLOOD TYPING SEROLOGIC ABO: CPT | Performed by: OBSTETRICS & GYNECOLOGY

## 2017-04-24 PROCEDURE — 25010000002 FUROSEMIDE PER 20 MG: Performed by: NURSE ANESTHETIST, CERTIFIED REGISTERED

## 2017-04-24 PROCEDURE — 25010000002 MORPHINE PER 10 MG

## 2017-04-24 PROCEDURE — 25010000002 SUCCINYLCHOLINE PER 20 MG: Performed by: NURSE ANESTHETIST, CERTIFIED REGISTERED

## 2017-04-24 PROCEDURE — 86900 BLOOD TYPING SEROLOGIC ABO: CPT

## 2017-04-24 PROCEDURE — 25010000003 CEFAZOLIN PER 500 MG: Performed by: OBSTETRICS & GYNECOLOGY

## 2017-04-24 PROCEDURE — 25010000002 MIDAZOLAM PER 1 MG: Performed by: NURSE ANESTHETIST, CERTIFIED REGISTERED

## 2017-04-24 PROCEDURE — 86850 RBC ANTIBODY SCREEN: CPT | Performed by: OBSTETRICS & GYNECOLOGY

## 2017-04-24 PROCEDURE — 25010000002 PROPOFOL 200 MG/20ML EMULSION: Performed by: NURSE ANESTHETIST, CERTIFIED REGISTERED

## 2017-04-24 PROCEDURE — 99222 1ST HOSP IP/OBS MODERATE 55: CPT | Performed by: SURGERY

## 2017-04-24 PROCEDURE — 0UT70ZZ RESECTION OF BILATERAL FALLOPIAN TUBES, OPEN APPROACH: ICD-10-PCS | Performed by: OBSTETRICS & GYNECOLOGY

## 2017-04-24 PROCEDURE — 0DN80ZZ RELEASE SMALL INTESTINE, OPEN APPROACH: ICD-10-PCS | Performed by: SURGERY

## 2017-04-24 RX ORDER — MORPHINE SULFATE 2 MG/ML
2 INJECTION, SOLUTION INTRAMUSCULAR; INTRAVENOUS EVERY 4 HOURS PRN
Status: DISCONTINUED | OUTPATIENT
Start: 2017-04-24 | End: 2017-04-29 | Stop reason: HOSPADM

## 2017-04-24 RX ORDER — DEXAMETHASONE SODIUM PHOSPHATE 4 MG/ML
INJECTION, SOLUTION INTRA-ARTICULAR; INTRALESIONAL; INTRAMUSCULAR; INTRAVENOUS; SOFT TISSUE AS NEEDED
Status: DISCONTINUED | OUTPATIENT
Start: 2017-04-24 | End: 2017-04-24 | Stop reason: SURG

## 2017-04-24 RX ORDER — MIDAZOLAM HYDROCHLORIDE 1 MG/ML
INJECTION INTRAMUSCULAR; INTRAVENOUS AS NEEDED
Status: DISCONTINUED | OUTPATIENT
Start: 2017-04-24 | End: 2017-04-24 | Stop reason: SURG

## 2017-04-24 RX ORDER — SIMETHICONE 80 MG
80 TABLET,CHEWABLE ORAL 4 TIMES DAILY PRN
Status: DISCONTINUED | OUTPATIENT
Start: 2017-04-24 | End: 2017-04-29 | Stop reason: HOSPADM

## 2017-04-24 RX ORDER — NALOXONE HCL 0.4 MG/ML
0.4 VIAL (ML) INJECTION
Status: DISCONTINUED | OUTPATIENT
Start: 2017-04-24 | End: 2017-04-29 | Stop reason: HOSPADM

## 2017-04-24 RX ORDER — DEXTROSE AND SODIUM CHLORIDE 5; .45 G/100ML; G/100ML
125 INJECTION, SOLUTION INTRAVENOUS CONTINUOUS
Status: DISCONTINUED | OUTPATIENT
Start: 2017-04-24 | End: 2017-04-29 | Stop reason: HOSPADM

## 2017-04-24 RX ORDER — MORPHINE SULFATE 2 MG/ML
2 INJECTION, SOLUTION INTRAMUSCULAR; INTRAVENOUS
Status: DISCONTINUED | OUTPATIENT
Start: 2017-04-24 | End: 2017-04-24 | Stop reason: HOSPADM

## 2017-04-24 RX ORDER — ZOLPIDEM TARTRATE 5 MG/1
10 TABLET ORAL NIGHTLY PRN
Status: DISCONTINUED | OUTPATIENT
Start: 2017-04-24 | End: 2017-04-29 | Stop reason: HOSPADM

## 2017-04-24 RX ORDER — DIPHENHYDRAMINE HYDROCHLORIDE 50 MG/ML
25 INJECTION INTRAMUSCULAR; INTRAVENOUS EVERY 6 HOURS PRN
Status: DISCONTINUED | OUTPATIENT
Start: 2017-04-24 | End: 2017-04-29 | Stop reason: HOSPADM

## 2017-04-24 RX ORDER — ONDANSETRON 4 MG/1
4 TABLET, FILM COATED ORAL EVERY 6 HOURS PRN
Status: DISCONTINUED | OUTPATIENT
Start: 2017-04-24 | End: 2017-04-29 | Stop reason: HOSPADM

## 2017-04-24 RX ORDER — PROMETHAZINE HYDROCHLORIDE 25 MG/ML
12.5 INJECTION, SOLUTION INTRAMUSCULAR; INTRAVENOUS EVERY 6 HOURS PRN
Status: DISCONTINUED | OUTPATIENT
Start: 2017-04-24 | End: 2017-04-29 | Stop reason: HOSPADM

## 2017-04-24 RX ORDER — MEPERIDINE HYDROCHLORIDE 25 MG/ML
25 INJECTION INTRAMUSCULAR; INTRAVENOUS; SUBCUTANEOUS
Status: DISCONTINUED | OUTPATIENT
Start: 2017-04-24 | End: 2017-04-24 | Stop reason: HOSPADM

## 2017-04-24 RX ORDER — SUCCINYLCHOLINE CHLORIDE 20 MG/ML
INJECTION INTRAMUSCULAR; INTRAVENOUS AS NEEDED
Status: DISCONTINUED | OUTPATIENT
Start: 2017-04-24 | End: 2017-04-24 | Stop reason: SURG

## 2017-04-24 RX ORDER — KETOROLAC TROMETHAMINE 30 MG/ML
INJECTION, SOLUTION INTRAMUSCULAR; INTRAVENOUS AS NEEDED
Status: DISCONTINUED | OUTPATIENT
Start: 2017-04-24 | End: 2017-04-24 | Stop reason: SURG

## 2017-04-24 RX ORDER — BUPIVACAINE HYDROCHLORIDE 5 MG/ML
INJECTION, SOLUTION EPIDURAL; INTRACAUDAL
Status: COMPLETED
Start: 2017-04-24 | End: 2017-04-24

## 2017-04-24 RX ORDER — NALOXONE HCL 0.4 MG/ML
0.1 VIAL (ML) INJECTION
Status: DISCONTINUED | OUTPATIENT
Start: 2017-04-24 | End: 2017-04-29 | Stop reason: HOSPADM

## 2017-04-24 RX ORDER — CELECOXIB 100 MG/1
CAPSULE ORAL
Status: COMPLETED
Start: 2017-04-24 | End: 2017-04-24

## 2017-04-24 RX ORDER — PHENAZOPYRIDINE HYDROCHLORIDE 100 MG/1
200 TABLET, FILM COATED ORAL ONCE
Status: COMPLETED | OUTPATIENT
Start: 2017-04-24 | End: 2017-04-24

## 2017-04-24 RX ORDER — BISACODYL 10 MG
10 SUPPOSITORY, RECTAL RECTAL DAILY PRN
Status: DISCONTINUED | OUTPATIENT
Start: 2017-04-24 | End: 2017-04-29 | Stop reason: HOSPADM

## 2017-04-24 RX ORDER — MORPHINE SULFATE 2 MG/ML
1 INJECTION, SOLUTION INTRAMUSCULAR; INTRAVENOUS EVERY 4 HOURS PRN
Status: DISCONTINUED | OUTPATIENT
Start: 2017-04-24 | End: 2017-04-29 | Stop reason: HOSPADM

## 2017-04-24 RX ORDER — GABAPENTIN 300 MG/1
300 CAPSULE ORAL ONCE
Status: COMPLETED | OUTPATIENT
Start: 2017-04-24 | End: 2017-04-24

## 2017-04-24 RX ORDER — DOCUSATE SODIUM 100 MG/1
100 CAPSULE, LIQUID FILLED ORAL 2 TIMES DAILY PRN
Status: DISCONTINUED | OUTPATIENT
Start: 2017-04-24 | End: 2017-04-29 | Stop reason: HOSPADM

## 2017-04-24 RX ORDER — KETAMINE HYDROCHLORIDE 50 MG/ML
INJECTION, SOLUTION, CONCENTRATE INTRAMUSCULAR; INTRAVENOUS AS NEEDED
Status: DISCONTINUED | OUTPATIENT
Start: 2017-04-24 | End: 2017-04-24 | Stop reason: SURG

## 2017-04-24 RX ORDER — SODIUM CHLORIDE 0.9 % (FLUSH) 0.9 %
1-10 SYRINGE (ML) INJECTION AS NEEDED
Status: DISCONTINUED | OUTPATIENT
Start: 2017-04-24 | End: 2017-04-24 | Stop reason: HOSPADM

## 2017-04-24 RX ORDER — ACETAMINOPHEN 500 MG
TABLET ORAL
Status: COMPLETED
Start: 2017-04-24 | End: 2017-04-24

## 2017-04-24 RX ORDER — FUROSEMIDE 10 MG/ML
INJECTION INTRAMUSCULAR; INTRAVENOUS AS NEEDED
Status: DISCONTINUED | OUTPATIENT
Start: 2017-04-24 | End: 2017-04-24 | Stop reason: SURG

## 2017-04-24 RX ORDER — GABAPENTIN 300 MG/1
CAPSULE ORAL
Status: COMPLETED
Start: 2017-04-24 | End: 2017-04-24

## 2017-04-24 RX ORDER — PROMETHAZINE HYDROCHLORIDE 25 MG/ML
12.5 INJECTION, SOLUTION INTRAMUSCULAR; INTRAVENOUS EVERY 4 HOURS PRN
Status: DISCONTINUED | OUTPATIENT
Start: 2017-04-24 | End: 2017-04-29 | Stop reason: HOSPADM

## 2017-04-24 RX ORDER — IBUPROFEN 600 MG/1
600 TABLET ORAL EVERY 6 HOURS PRN
Status: DISCONTINUED | OUTPATIENT
Start: 2017-04-25 | End: 2017-04-29 | Stop reason: HOSPADM

## 2017-04-24 RX ORDER — PROMETHAZINE HYDROCHLORIDE 12.5 MG/1
12.5 SUPPOSITORY RECTAL EVERY 6 HOURS PRN
Status: DISCONTINUED | OUTPATIENT
Start: 2017-04-24 | End: 2017-04-29 | Stop reason: HOSPADM

## 2017-04-24 RX ORDER — FENTANYL CITRATE 50 UG/ML
INJECTION, SOLUTION INTRAMUSCULAR; INTRAVENOUS AS NEEDED
Status: DISCONTINUED | OUTPATIENT
Start: 2017-04-24 | End: 2017-04-24 | Stop reason: SURG

## 2017-04-24 RX ORDER — ONDANSETRON 4 MG/1
4 TABLET, ORALLY DISINTEGRATING ORAL EVERY 6 HOURS PRN
Status: DISCONTINUED | OUTPATIENT
Start: 2017-04-24 | End: 2017-04-29 | Stop reason: HOSPADM

## 2017-04-24 RX ORDER — ONDANSETRON 2 MG/ML
INJECTION INTRAMUSCULAR; INTRAVENOUS AS NEEDED
Status: DISCONTINUED | OUTPATIENT
Start: 2017-04-24 | End: 2017-04-24 | Stop reason: SURG

## 2017-04-24 RX ORDER — MORPHINE SULFATE/0.9% NACL/PF 1 MG/ML
SYRINGE (ML) INJECTION
Status: COMPLETED
Start: 2017-04-24 | End: 2017-04-24

## 2017-04-24 RX ORDER — ACETAMINOPHEN 500 MG
1000 TABLET ORAL ONCE
Status: COMPLETED | OUTPATIENT
Start: 2017-04-24 | End: 2017-04-24

## 2017-04-24 RX ORDER — GLYCOPYRROLATE 0.2 MG/ML
INJECTION INTRAMUSCULAR; INTRAVENOUS AS NEEDED
Status: DISCONTINUED | OUTPATIENT
Start: 2017-04-24 | End: 2017-04-24 | Stop reason: SURG

## 2017-04-24 RX ORDER — OXYCODONE HYDROCHLORIDE AND ACETAMINOPHEN 5; 325 MG/1; MG/1
2 TABLET ORAL EVERY 4 HOURS PRN
Status: DISCONTINUED | OUTPATIENT
Start: 2017-04-25 | End: 2017-04-29 | Stop reason: HOSPADM

## 2017-04-24 RX ORDER — PHENAZOPYRIDINE HYDROCHLORIDE 100 MG/1
TABLET, FILM COATED ORAL
Status: COMPLETED
Start: 2017-04-24 | End: 2017-04-24

## 2017-04-24 RX ORDER — CELECOXIB 100 MG/1
200 CAPSULE ORAL ONCE
Status: COMPLETED | OUTPATIENT
Start: 2017-04-24 | End: 2017-04-24

## 2017-04-24 RX ORDER — ONDANSETRON 2 MG/ML
4 INJECTION INTRAMUSCULAR; INTRAVENOUS EVERY 6 HOURS PRN
Status: DISCONTINUED | OUTPATIENT
Start: 2017-04-24 | End: 2017-04-29 | Stop reason: HOSPADM

## 2017-04-24 RX ORDER — ALUMINA, MAGNESIA, AND SIMETHICONE 2400; 2400; 240 MG/30ML; MG/30ML; MG/30ML
15 SUSPENSION ORAL EVERY 6 HOURS PRN
Status: DISCONTINUED | OUTPATIENT
Start: 2017-04-24 | End: 2017-04-29 | Stop reason: HOSPADM

## 2017-04-24 RX ORDER — ROCURONIUM BROMIDE 10 MG/ML
INJECTION, SOLUTION INTRAVENOUS AS NEEDED
Status: DISCONTINUED | OUTPATIENT
Start: 2017-04-24 | End: 2017-04-24 | Stop reason: SURG

## 2017-04-24 RX ORDER — SODIUM CHLORIDE, SODIUM LACTATE, POTASSIUM CHLORIDE, CALCIUM CHLORIDE 600; 310; 30; 20 MG/100ML; MG/100ML; MG/100ML; MG/100ML
1000 INJECTION, SOLUTION INTRAVENOUS CONTINUOUS PRN
Status: DISCONTINUED | OUTPATIENT
Start: 2017-04-24 | End: 2017-04-24 | Stop reason: HOSPADM

## 2017-04-24 RX ORDER — AMITRIPTYLINE HYDROCHLORIDE 25 MG/1
100 TABLET, FILM COATED ORAL NIGHTLY
Status: DISCONTINUED | OUTPATIENT
Start: 2017-04-24 | End: 2017-04-29 | Stop reason: HOSPADM

## 2017-04-24 RX ORDER — PROMETHAZINE HYDROCHLORIDE 12.5 MG/1
12.5 TABLET ORAL EVERY 6 HOURS PRN
Status: DISCONTINUED | OUTPATIENT
Start: 2017-04-24 | End: 2017-04-29 | Stop reason: HOSPADM

## 2017-04-24 RX ORDER — MORPHINE SULFATE 1 MG/ML
INJECTION INTRAVENOUS CONTINUOUS
Status: DISCONTINUED | OUTPATIENT
Start: 2017-04-24 | End: 2017-04-29 | Stop reason: HOSPADM

## 2017-04-24 RX ORDER — PROPOFOL 10 MG/ML
INJECTION, EMULSION INTRAVENOUS AS NEEDED
Status: DISCONTINUED | OUTPATIENT
Start: 2017-04-24 | End: 2017-04-24 | Stop reason: SURG

## 2017-04-24 RX ADMIN — ACETAMINOPHEN 1000 MG: 500 TABLET, COATED ORAL at 07:53

## 2017-04-24 RX ADMIN — DEXAMETHASONE SODIUM PHOSPHATE 8 MG: 4 INJECTION, SOLUTION INTRAMUSCULAR; INTRAVENOUS at 09:38

## 2017-04-24 RX ADMIN — SODIUM CHLORIDE, POTASSIUM CHLORIDE, SODIUM LACTATE AND CALCIUM CHLORIDE 1000 ML: 600; 310; 30; 20 INJECTION, SOLUTION INTRAVENOUS at 07:41

## 2017-04-24 RX ADMIN — PROPOFOL 150 MG: 10 INJECTION, EMULSION INTRAVENOUS at 08:45

## 2017-04-24 RX ADMIN — CELECOXIB 200 MG: 100 CAPSULE ORAL at 07:53

## 2017-04-24 RX ADMIN — FENTANYL CITRATE 100 MCG: 50 INJECTION, SOLUTION INTRAMUSCULAR; INTRAVENOUS at 09:02

## 2017-04-24 RX ADMIN — KETOROLAC TROMETHAMINE 30 MG: 30 INJECTION, SOLUTION INTRAMUSCULAR at 11:18

## 2017-04-24 RX ADMIN — SUCCINYLCHOLINE CHLORIDE 80 MG: 20 INJECTION, SOLUTION INTRAMUSCULAR; INTRAVENOUS at 08:45

## 2017-04-24 RX ADMIN — GABAPENTIN 300 MG: 300 CAPSULE ORAL at 07:53

## 2017-04-24 RX ADMIN — DEXTROSE AND SODIUM CHLORIDE 125 ML/HR: 5; 450 INJECTION, SOLUTION INTRAVENOUS at 13:02

## 2017-04-24 RX ADMIN — GLYCOPYRROLATE 0.2 MG: 0.2 INJECTION, SOLUTION INTRAMUSCULAR; INTRAVENOUS at 08:45

## 2017-04-24 RX ADMIN — SODIUM CHLORIDE, POTASSIUM CHLORIDE, SODIUM LACTATE AND CALCIUM CHLORIDE: 600; 310; 30; 20 INJECTION, SOLUTION INTRAVENOUS at 10:56

## 2017-04-24 RX ADMIN — FENTANYL CITRATE 100 MCG: 50 INJECTION, SOLUTION INTRAMUSCULAR; INTRAVENOUS at 08:45

## 2017-04-24 RX ADMIN — LIDOCAINE HYDROCHLORIDE 60 MG: 20 INJECTION, SOLUTION INTRAVENOUS at 08:45

## 2017-04-24 RX ADMIN — DEXTROSE AND SODIUM CHLORIDE 125 ML/HR: 5; 450 INJECTION, SOLUTION INTRAVENOUS at 22:10

## 2017-04-24 RX ADMIN — MIDAZOLAM HYDROCHLORIDE 2 MG: 1 INJECTION, SOLUTION INTRAMUSCULAR; INTRAVENOUS at 08:44

## 2017-04-24 RX ADMIN — PHENAZOPYRIDINE HYDROCHLORIDE 200 MG: 100 TABLET ORAL at 07:30

## 2017-04-24 RX ADMIN — ONDANSETRON 4 MG: 2 INJECTION INTRAMUSCULAR; INTRAVENOUS at 11:18

## 2017-04-24 RX ADMIN — Medication 1000 MG: at 07:53

## 2017-04-24 RX ADMIN — ROCURONIUM BROMIDE 20 MG: 10 INJECTION INTRAVENOUS at 09:02

## 2017-04-24 RX ADMIN — AMITRIPTYLINE HYDROCHLORIDE 100 MG: 25 TABLET, FILM COATED ORAL at 20:27

## 2017-04-24 RX ADMIN — BUPIVACAINE HYDROCHLORIDE 20 ML: 5 INJECTION, SOLUTION EPIDURAL; INTRACAUDAL; PERINEURAL at 12:45

## 2017-04-24 RX ADMIN — FENTANYL CITRATE 50 MCG: 50 INJECTION, SOLUTION INTRAMUSCULAR; INTRAVENOUS at 10:49

## 2017-04-24 RX ADMIN — CEFAZOLIN SODIUM 2 G: 2 SOLUTION INTRAVENOUS at 08:39

## 2017-04-24 RX ADMIN — MORPHINE SULFATE: 1 INJECTION INTRAVENOUS at 13:03

## 2017-04-24 RX ADMIN — ROCURONIUM BROMIDE 20 MG: 10 INJECTION INTRAVENOUS at 09:32

## 2017-04-24 RX ADMIN — KETAMINE HYDROCHLORIDE 25 MG: 50 INJECTION, SOLUTION INTRAMUSCULAR; INTRAVENOUS at 08:45

## 2017-04-24 RX ADMIN — PHENAZOPYRIDINE HYDROCHLORIDE 200 MG: 100 TABLET, FILM COATED ORAL at 07:30

## 2017-04-24 RX ADMIN — FENTANYL CITRATE 50 MCG: 50 INJECTION, SOLUTION INTRAMUSCULAR; INTRAVENOUS at 09:32

## 2017-04-24 RX ADMIN — SODIUM CHLORIDE, POTASSIUM CHLORIDE, SODIUM LACTATE AND CALCIUM CHLORIDE: 600; 310; 30; 20 INJECTION, SOLUTION INTRAVENOUS at 09:13

## 2017-04-24 RX ADMIN — FUROSEMIDE 20 MG: 10 INJECTION, SOLUTION INTRAMUSCULAR; INTRAVENOUS at 10:33

## 2017-04-24 RX ADMIN — Medication: at 13:03

## 2017-04-24 RX ADMIN — ROCURONIUM BROMIDE 10 MG: 10 INJECTION INTRAVENOUS at 08:45

## 2017-04-24 NOTE — ANESTHESIA PROCEDURE NOTES
Peripheral Block    Patient location during procedure: post-op  Start time: 4/24/2017 12:36 PM  Stop time: 4/24/2017 12:40 PM  Reason for block: at surgeon's request and post-op pain management  Performed by  CRNA: DARRIUS GRIFFIN  Preanesthetic Checklist  Completed: patient identified, site marked, surgical consent, pre-op evaluation, timeout performed, IV checked, risks and benefits discussed and monitors and equipment checked  Peripheral Block Prep:  Sterile barriers:gloves, cap, sterile barriers and mask  Prep: ChloraPrep  Patient monitoring: blood pressure monitoring, continuous pulse oximetry and EKG  Peripheral Procedure  Sedation:no  Guidance:ultrasound guided  Images:still images obtained    Laterality:Bilateral  Block Type:TAP  Injection Technique:single-shot  Needle Type:Tuohy  Needle Gauge:20 G    Medications  Preservative Free Saline:20ml  Comment:      Local Injected:bupivacaine 0.25% Local Amount Injected:25mL  Post Assessment  Injection Assessment: negative aspiration for heme, no paresthesia on injection and incremental injection  Patient Tolerance:comfortable throughout block  Complications:no  Additional Notes  Procedure:      BILATERAL TAP BLOCKS                             Patient analgesia was achieved with patient awake and no skin infiltrate    The pt was placed in the Supine Position and under Ultrasound guidance, an echogenic or touhy needle was advanced with Normal Saline hydro dissection of tissue.  The Internal Oblique and Transversus Abdominus muscles were visualized.  At or before the aponeurosis of Internal Oblique, the local anesthetic spread was visualized in the Transversus Abdominus Plane. Injection was made incrementally with aspiration every 5 mls.  There was no intravascular injection;  injection pressure was normal; there was no neural injection; and the procedure was completed without difficulty.

## 2017-04-24 NOTE — ANESTHESIA PREPROCEDURE EVALUATION
Anesthesia Evaluation     Patient summary reviewed and Nursing notes reviewed   NPO Status: > 8 hours   Airway   Mallampati: I  TM distance: >3 FB  Neck ROM: full  no difficulty expected  Dental    (+) edentulous    Pulmonary - normal exam   (+) a smoker (Quit 1996) Former,   Cardiovascular - normal exam    (-) hypertension      Neuro/Psych  (+) headaches, syncope, numbness (chronic left sided transient weakness that results in patient fall), psychiatric history (Clostrophobia),    GI/Hepatic/Renal/Endo    (+) obesity (BMI 31),      Musculoskeletal     Abdominal  - normal exam   Substance History      OB/GYN    (-) Preeclampsia and history of pregnancy induced hypertension        Other        ROS/Med Hx Other: +Insomnia      Phys Exam Other: Top dentures                        Anesthesia Plan    ASA 2     general and regional   (Discussed r/b bilat TAP block or QL block.   Patient agrees to postop block if needed.     PO meds ordered    Risks and benefits discussed including risk of aspiration, recall and dental damage. All patient questions answered. Will continue with POC.)  intravenous induction   Anesthetic plan and risks discussed with patient.

## 2017-04-24 NOTE — ANESTHESIA POSTPROCEDURE EVALUATION
Patient: Ange Wade    Procedure Summary     Date Anesthesia Start Anesthesia Stop Room / Location    04/24/17 0839   [facility] /  [facility]       Procedure Diagnosis Surgeon Provider    LAPAROTOMY EXPLORATORY with bilateral salpingo-oophorectomy, extensive lysis of adhesions appendectomy, cystoscopy (N/A Abdomen) Right ovarian cyst  (Right ovarian cyst [N83.201]) MD Leo Estes, MELVA          Anesthesia Type: general, regional  Last vitals  BP      Temp      Pulse     Resp      SpO2        Post Anesthesia Care and Evaluation    Patient location during evaluation: PACU  Patient participation: complete - patient participated  Level of consciousness: awake and responsive to verbal stimuli  Pain score: 0  Pain management: satisfactory to patient  Airway patency: patent  Anesthetic complications: No anesthetic complications  PONV Status: none  Cardiovascular status: acceptable and stable  Respiratory status: face mask  Hydration status: acceptable

## 2017-04-24 NOTE — ANESTHESIA PROCEDURE NOTES
Airway  Airway not difficult    General Information and Staff    Patient location during procedure: OR  CRNA: KEELEY BROWN    Indications and Patient Condition  Indications for airway management: airway protection    Preoxygenated: yes  Mask difficulty assessment: 1 - vent by mask    Final Airway Details  Final airway type: endotracheal airway      Successful airway: ETT  Cuffed: yes   Successful intubation technique: direct laryngoscopy  Facilitating devices/methods: intubating stylet  Endotracheal tube insertion site: oral  Blade: Oshea  Blade size: #2  ETT size: 7.0 mm  Cormack-Lehane Classification: grade I - full view of glottis  Placement verified by: chest auscultation and capnometry   Measured from: lips  ETT to lips (cm): 21  Number of attempts at approach: 1    Additional Comments  Dentition and Lips as preoperative assesment

## 2017-04-25 LAB
DEPRECATED RDW RBC AUTO: 41.5 FL (ref 37–54)
ERYTHROCYTE [DISTWIDTH] IN BLOOD BY AUTOMATED COUNT: 14.2 % (ref 11.5–14.5)
HCT VFR BLD AUTO: 33.7 % (ref 37–47)
HGB BLD-MCNC: 10.9 G/DL (ref 12–16)
MCH RBC QN AUTO: 26.5 PG (ref 27–31)
MCHC RBC AUTO-ENTMCNC: 32.3 G/DL (ref 30–37)
MCV RBC AUTO: 81.8 FL (ref 81–99)
PLATELET # BLD AUTO: 237 10*3/MM3 (ref 130–400)
PMV BLD AUTO: 11.7 FL (ref 6–12)
RBC # BLD AUTO: 4.12 10*6/MM3 (ref 4.2–5.4)
WBC NRBC COR # BLD: 11.8 10*3/MM3 (ref 4.8–10.8)

## 2017-04-25 PROCEDURE — 99024 POSTOP FOLLOW-UP VISIT: CPT | Performed by: SURGERY

## 2017-04-25 PROCEDURE — 85027 COMPLETE CBC AUTOMATED: CPT | Performed by: OBSTETRICS & GYNECOLOGY

## 2017-04-25 PROCEDURE — 25010000002 ONDANSETRON PER 1 MG: Performed by: OBSTETRICS & GYNECOLOGY

## 2017-04-25 RX ADMIN — IBUPROFEN 600 MG: 600 TABLET ORAL at 08:29

## 2017-04-25 RX ADMIN — SIMETHICONE CHEW TAB 80 MG 80 MG: 80 TABLET ORAL at 08:30

## 2017-04-25 RX ADMIN — OXYCODONE HYDROCHLORIDE AND ACETAMINOPHEN 2 TABLET: 5; 325 TABLET ORAL at 17:55

## 2017-04-25 RX ADMIN — DOCUSATE SODIUM 100 MG: 100 CAPSULE, LIQUID FILLED ORAL at 08:30

## 2017-04-25 RX ADMIN — SIMETHICONE CHEW TAB 80 MG 80 MG: 80 TABLET ORAL at 17:55

## 2017-04-25 RX ADMIN — OXYCODONE HYDROCHLORIDE AND ACETAMINOPHEN 2 TABLET: 5; 325 TABLET ORAL at 11:45

## 2017-04-25 RX ADMIN — OXYCODONE HYDROCHLORIDE AND ACETAMINOPHEN 2 TABLET: 5; 325 TABLET ORAL at 05:13

## 2017-04-25 RX ADMIN — DEXTROSE AND SODIUM CHLORIDE 125 ML/HR: 5; 450 INJECTION, SOLUTION INTRAVENOUS at 06:12

## 2017-04-25 RX ADMIN — IBUPROFEN 600 MG: 600 TABLET ORAL at 17:55

## 2017-04-25 RX ADMIN — OXYCODONE HYDROCHLORIDE AND ACETAMINOPHEN 2 TABLET: 5; 325 TABLET ORAL at 08:30

## 2017-04-25 RX ADMIN — AMITRIPTYLINE HYDROCHLORIDE 100 MG: 25 TABLET, FILM COATED ORAL at 20:31

## 2017-04-25 RX ADMIN — ONDANSETRON 4 MG: 2 INJECTION INTRAMUSCULAR; INTRAVENOUS at 05:11

## 2017-04-25 RX ADMIN — SIMETHICONE CHEW TAB 80 MG 80 MG: 80 TABLET ORAL at 11:45

## 2017-04-26 PROCEDURE — 99024 POSTOP FOLLOW-UP VISIT: CPT | Performed by: OBSTETRICS & GYNECOLOGY

## 2017-04-26 RX ADMIN — SIMETHICONE CHEW TAB 80 MG 80 MG: 80 TABLET ORAL at 08:19

## 2017-04-26 RX ADMIN — IBUPROFEN 600 MG: 600 TABLET ORAL at 18:14

## 2017-04-26 RX ADMIN — DOCUSATE SODIUM 100 MG: 100 CAPSULE, LIQUID FILLED ORAL at 20:57

## 2017-04-26 RX ADMIN — DOCUSATE SODIUM 100 MG: 100 CAPSULE, LIQUID FILLED ORAL at 08:19

## 2017-04-26 RX ADMIN — OXYCODONE HYDROCHLORIDE AND ACETAMINOPHEN 2 TABLET: 5; 325 TABLET ORAL at 10:31

## 2017-04-26 RX ADMIN — ZOLPIDEM TARTRATE 10 MG: 5 TABLET, FILM COATED ORAL at 21:00

## 2017-04-26 RX ADMIN — IBUPROFEN 600 MG: 600 TABLET ORAL at 12:03

## 2017-04-26 RX ADMIN — OXYCODONE HYDROCHLORIDE AND ACETAMINOPHEN 1 TABLET: 5; 325 TABLET ORAL at 20:57

## 2017-04-26 RX ADMIN — OXYCODONE HYDROCHLORIDE AND ACETAMINOPHEN 1 TABLET: 5; 325 TABLET ORAL at 14:48

## 2017-04-26 RX ADMIN — AMITRIPTYLINE HYDROCHLORIDE 100 MG: 25 TABLET, FILM COATED ORAL at 20:56

## 2017-04-26 RX ADMIN — BISACODYL 10 MG: 10 SUPPOSITORY RECTAL at 08:19

## 2017-04-26 RX ADMIN — SIMETHICONE CHEW TAB 80 MG 80 MG: 80 TABLET ORAL at 18:14

## 2017-04-26 RX ADMIN — OXYCODONE HYDROCHLORIDE AND ACETAMINOPHEN 2 TABLET: 5; 325 TABLET ORAL at 05:51

## 2017-04-26 RX ADMIN — SIMETHICONE CHEW TAB 80 MG 80 MG: 80 TABLET ORAL at 12:03

## 2017-04-26 RX ADMIN — IBUPROFEN 600 MG: 600 TABLET ORAL at 05:51

## 2017-04-27 LAB
ANION GAP SERPL CALCULATED.3IONS-SCNC: 11.6 MMOL/L
ANISOCYTOSIS BLD QL: NORMAL
BUN BLD-MCNC: 8 MG/DL (ref 7–20)
BUN/CREAT SERPL: 11.4 (ref 7.1–23.5)
CALCIUM SPEC-SCNC: 8.3 MG/DL (ref 8.4–10.2)
CHLORIDE SERPL-SCNC: 104 MMOL/L (ref 98–107)
CO2 SERPL-SCNC: 28 MMOL/L (ref 26–30)
CREAT BLD-MCNC: 0.7 MG/DL (ref 0.6–1.3)
DEPRECATED RDW RBC AUTO: 44.7 FL (ref 37–54)
ERYTHROCYTE [DISTWIDTH] IN BLOOD BY AUTOMATED COUNT: 14.6 % (ref 11.5–14.5)
GFR SERPL CREATININE-BSD FRML MDRD: 95 ML/MIN/1.73
GLUCOSE BLD-MCNC: 90 MG/DL (ref 74–98)
HCT VFR BLD AUTO: 32.8 % (ref 37–47)
HGB BLD-MCNC: 10.5 G/DL (ref 12–16)
LYMPHOCYTES # BLD MANUAL: 1.15 10*3/MM3 (ref 0.6–3.4)
LYMPHOCYTES NFR BLD MANUAL: 15 % (ref 10–50)
LYMPHOCYTES NFR BLD MANUAL: 2 % (ref 0–12)
MCH RBC QN AUTO: 26.8 PG (ref 27–31)
MCHC RBC AUTO-ENTMCNC: 32 G/DL (ref 30–37)
MCV RBC AUTO: 83.7 FL (ref 81–99)
MONOCYTES # BLD AUTO: 0.15 10*3/MM3 (ref 0–0.9)
NEUTROPHILS # BLD AUTO: 6.37 10*3/MM3 (ref 2–6.9)
NEUTROPHILS NFR BLD MANUAL: 78 % (ref 37–80)
NEUTS BAND NFR BLD MANUAL: 5 % (ref 0–6)
PLATELET # BLD AUTO: 216 10*3/MM3 (ref 130–400)
PMV BLD AUTO: 11.1 FL (ref 6–12)
POTASSIUM BLD-SCNC: 3.6 MMOL/L (ref 3.5–5.1)
RBC # BLD AUTO: 3.92 10*6/MM3 (ref 4.2–5.4)
SMALL PLATELETS BLD QL SMEAR: ADEQUATE
SODIUM BLD-SCNC: 140 MMOL/L (ref 137–145)
WBC MORPH BLD: NORMAL
WBC NRBC COR # BLD: 7.68 10*3/MM3 (ref 4.8–10.8)

## 2017-04-27 PROCEDURE — 99024 POSTOP FOLLOW-UP VISIT: CPT | Performed by: NURSE PRACTITIONER

## 2017-04-27 PROCEDURE — 85027 COMPLETE CBC AUTOMATED: CPT | Performed by: NURSE PRACTITIONER

## 2017-04-27 PROCEDURE — 80048 BASIC METABOLIC PNL TOTAL CA: CPT | Performed by: NURSE PRACTITIONER

## 2017-04-27 PROCEDURE — 85007 BL SMEAR W/DIFF WBC COUNT: CPT | Performed by: NURSE PRACTITIONER

## 2017-04-27 RX ADMIN — AMITRIPTYLINE HYDROCHLORIDE 100 MG: 25 TABLET, FILM COATED ORAL at 20:50

## 2017-04-27 RX ADMIN — OXYCODONE HYDROCHLORIDE AND ACETAMINOPHEN 2 TABLET: 5; 325 TABLET ORAL at 08:11

## 2017-04-27 RX ADMIN — OXYCODONE HYDROCHLORIDE AND ACETAMINOPHEN 2 TABLET: 5; 325 TABLET ORAL at 15:43

## 2017-04-27 RX ADMIN — ONDANSETRON 4 MG: 4 TABLET, FILM COATED ORAL at 08:14

## 2017-04-27 RX ADMIN — DOCUSATE SODIUM 100 MG: 100 CAPSULE, LIQUID FILLED ORAL at 08:11

## 2017-04-27 RX ADMIN — IBUPROFEN 600 MG: 600 TABLET ORAL at 08:12

## 2017-04-27 RX ADMIN — SIMETHICONE CHEW TAB 80 MG 80 MG: 80 TABLET ORAL at 15:43

## 2017-04-27 RX ADMIN — ONDANSETRON 4 MG: 4 TABLET, FILM COATED ORAL at 15:44

## 2017-04-27 RX ADMIN — SIMETHICONE CHEW TAB 80 MG 80 MG: 80 TABLET ORAL at 08:11

## 2017-04-27 RX ADMIN — IBUPROFEN 600 MG: 600 TABLET ORAL at 15:44

## 2017-04-28 PROCEDURE — 99024 POSTOP FOLLOW-UP VISIT: CPT | Performed by: PHYSICIAN ASSISTANT

## 2017-04-28 RX ADMIN — SIMETHICONE CHEW TAB 80 MG 80 MG: 80 TABLET ORAL at 18:16

## 2017-04-28 RX ADMIN — BISACODYL 10 MG: 10 SUPPOSITORY RECTAL at 08:42

## 2017-04-28 RX ADMIN — SIMETHICONE CHEW TAB 80 MG 80 MG: 80 TABLET ORAL at 15:44

## 2017-04-28 RX ADMIN — OXYCODONE HYDROCHLORIDE AND ACETAMINOPHEN 2 TABLET: 5; 325 TABLET ORAL at 15:44

## 2017-04-28 RX ADMIN — AMITRIPTYLINE HYDROCHLORIDE 100 MG: 25 TABLET, FILM COATED ORAL at 21:12

## 2017-04-28 RX ADMIN — DOCUSATE SODIUM 100 MG: 100 CAPSULE, LIQUID FILLED ORAL at 18:16

## 2017-04-28 RX ADMIN — OXYCODONE HYDROCHLORIDE AND ACETAMINOPHEN 2 TABLET: 5; 325 TABLET ORAL at 08:42

## 2017-04-28 RX ADMIN — DOCUSATE SODIUM 100 MG: 100 CAPSULE, LIQUID FILLED ORAL at 08:42

## 2017-04-28 RX ADMIN — SIMETHICONE CHEW TAB 80 MG 80 MG: 80 TABLET ORAL at 08:41

## 2017-04-29 VITALS
DIASTOLIC BLOOD PRESSURE: 64 MMHG | SYSTOLIC BLOOD PRESSURE: 99 MMHG | HEART RATE: 86 BPM | RESPIRATION RATE: 18 BRPM | OXYGEN SATURATION: 97 % | TEMPERATURE: 98.4 F

## 2017-04-29 RX ORDER — OXYCODONE HYDROCHLORIDE AND ACETAMINOPHEN 5; 325 MG/1; MG/1
2 TABLET ORAL EVERY 4 HOURS PRN
Qty: 20 TABLET | Refills: 0 | OUTPATIENT
Start: 2017-04-29 | End: 2017-06-07

## 2017-04-29 RX ORDER — IBUPROFEN 600 MG/1
600 TABLET ORAL EVERY 6 HOURS PRN
Qty: 60 TABLET | Refills: 0 | Status: SHIPPED | OUTPATIENT
Start: 2017-04-29 | End: 2017-06-13

## 2017-04-29 RX ORDER — PSEUDOEPHEDRINE HCL 30 MG
100 TABLET ORAL 2 TIMES DAILY PRN
Qty: 20 CAPSULE | Refills: 0 | Status: SHIPPED | OUTPATIENT
Start: 2017-04-29 | End: 2017-06-13

## 2017-04-29 RX ADMIN — SIMETHICONE CHEW TAB 80 MG 80 MG: 80 TABLET ORAL at 08:27

## 2017-04-29 RX ADMIN — IBUPROFEN 600 MG: 600 TABLET ORAL at 08:27

## 2017-04-29 RX ADMIN — DOCUSATE SODIUM 100 MG: 100 CAPSULE, LIQUID FILLED ORAL at 08:27

## 2017-05-01 ENCOUNTER — OFFICE VISIT (OUTPATIENT)
Dept: OBSTETRICS AND GYNECOLOGY | Facility: CLINIC | Age: 37
End: 2017-05-01

## 2017-05-01 VITALS
DIASTOLIC BLOOD PRESSURE: 66 MMHG | WEIGHT: 179 LBS | BODY MASS INDEX: 31.71 KG/M2 | HEIGHT: 63 IN | SYSTOLIC BLOOD PRESSURE: 122 MMHG

## 2017-05-01 DIAGNOSIS — R11.0 NAUSEA: ICD-10-CM

## 2017-05-01 DIAGNOSIS — Z98.890 STATUS POST EXPLORATORY LAPAROTOMY: Primary | ICD-10-CM

## 2017-05-01 PROCEDURE — 99024 POSTOP FOLLOW-UP VISIT: CPT | Performed by: PHYSICIAN ASSISTANT

## 2017-05-01 RX ORDER — PROMETHAZINE HYDROCHLORIDE 25 MG/1
25 TABLET ORAL EVERY 6 HOURS PRN
Qty: 30 TABLET | Refills: 0 | OUTPATIENT
Start: 2017-05-01 | End: 2017-06-07

## 2017-05-02 LAB
LAB AP CASE REPORT: NORMAL
Lab: NORMAL
PATH REPORT.ADDENDUM SPEC: NORMAL
PATH REPORT.FINAL DX SPEC: NORMAL

## 2017-05-11 DIAGNOSIS — N83.201 RIGHT OVARIAN CYST: ICD-10-CM

## 2017-05-11 DIAGNOSIS — G43.001 MIGRAINE WITHOUT AURA AND WITH STATUS MIGRAINOSUS, NOT INTRACTABLE: ICD-10-CM

## 2017-05-11 RX ORDER — IBUPROFEN 600 MG/1
TABLET ORAL
Qty: 60 TABLET | Refills: 0 | OUTPATIENT
Start: 2017-05-11

## 2017-05-12 DIAGNOSIS — G43.001 MIGRAINE WITHOUT AURA AND WITH STATUS MIGRAINOSUS, NOT INTRACTABLE: ICD-10-CM

## 2017-05-12 RX ORDER — SUMATRIPTAN 100 MG/1
TABLET, FILM COATED ORAL
Qty: 15 TABLET | Refills: 2 | OUTPATIENT
Start: 2017-05-12

## 2017-05-12 RX ORDER — SUMATRIPTAN 100 MG/1
TABLET, FILM COATED ORAL
Qty: 15 TABLET | Refills: 2 | Status: SHIPPED | OUTPATIENT
Start: 2017-05-12 | End: 2017-08-21 | Stop reason: SDUPTHER

## 2017-05-17 ENCOUNTER — TELEPHONE (OUTPATIENT)
Dept: OBSTETRICS AND GYNECOLOGY | Facility: CLINIC | Age: 37
End: 2017-05-17

## 2017-05-18 ENCOUNTER — HOSPITAL ENCOUNTER (EMERGENCY)
Facility: HOSPITAL | Age: 37
Discharge: HOME | End: 2017-05-18
Attending: EMERGENCY MEDICINE | Admitting: EMERGENCY MEDICINE

## 2017-05-18 ENCOUNTER — APPOINTMENT (OUTPATIENT)
Dept: CT IMAGING | Facility: HOSPITAL | Age: 37
End: 2017-05-18

## 2017-05-18 VITALS
WEIGHT: 162 LBS | OXYGEN SATURATION: 100 % | HEIGHT: 63 IN | HEART RATE: 67 BPM | RESPIRATION RATE: 18 BRPM | DIASTOLIC BLOOD PRESSURE: 88 MMHG | TEMPERATURE: 97.9 F | BODY MASS INDEX: 28.7 KG/M2 | SYSTOLIC BLOOD PRESSURE: 119 MMHG

## 2017-05-18 DIAGNOSIS — G89.18 POSTOPERATIVE PAIN: ICD-10-CM

## 2017-05-18 DIAGNOSIS — K52.9 COLITIS: Primary | ICD-10-CM

## 2017-05-18 LAB
ALBUMIN SERPL-MCNC: 4.4 G/DL (ref 3.5–5)
ALBUMIN/GLOB SERPL: 1.3 G/DL (ref 1–2)
ALP SERPL-CCNC: 75 U/L (ref 38–126)
ALT SERPL W P-5'-P-CCNC: 24 U/L (ref 13–69)
ANION GAP SERPL CALCULATED.3IONS-SCNC: 14.8 MMOL/L
AST SERPL-CCNC: 22 U/L (ref 15–46)
BASOPHILS # BLD AUTO: 0.05 10*3/MM3 (ref 0–0.2)
BASOPHILS NFR BLD AUTO: 0.9 % (ref 0–2.5)
BILIRUB SERPL-MCNC: 0.4 MG/DL (ref 0.2–1.3)
BILIRUB UR QL STRIP: NEGATIVE
BUN BLD-MCNC: 9 MG/DL (ref 7–20)
BUN/CREAT SERPL: 9 (ref 7.1–23.5)
CALCIUM SPEC-SCNC: 9.4 MG/DL (ref 8.4–10.2)
CHLORIDE SERPL-SCNC: 105 MMOL/L (ref 98–107)
CLARITY UR: CLEAR
CO2 SERPL-SCNC: 28 MMOL/L (ref 26–30)
COLOR UR: YELLOW
CREAT BLD-MCNC: 1 MG/DL (ref 0.6–1.3)
DEPRECATED RDW RBC AUTO: 39.8 FL (ref 37–54)
EOSINOPHIL # BLD AUTO: 0.13 10*3/MM3 (ref 0–0.7)
EOSINOPHIL NFR BLD AUTO: 2.4 % (ref 0–7)
ERYTHROCYTE [DISTWIDTH] IN BLOOD BY AUTOMATED COUNT: 13.6 % (ref 11.5–14.5)
GFR SERPL CREATININE-BSD FRML MDRD: 63 ML/MIN/1.73
GLOBULIN UR ELPH-MCNC: 3.3 GM/DL
GLUCOSE BLD-MCNC: 90 MG/DL (ref 74–98)
GLUCOSE UR STRIP-MCNC: NEGATIVE MG/DL
HCT VFR BLD AUTO: 39.2 % (ref 37–47)
HGB BLD-MCNC: 12.4 G/DL (ref 12–16)
HGB UR QL STRIP.AUTO: NEGATIVE
HOLD SPECIMEN: NORMAL
HOLD SPECIMEN: NORMAL
IMM GRANULOCYTES # BLD: 0.01 10*3/MM3 (ref 0–0.06)
IMM GRANULOCYTES NFR BLD: 0.2 % (ref 0–0.6)
KETONES UR QL STRIP: NEGATIVE
LEUKOCYTE ESTERASE UR QL STRIP.AUTO: NEGATIVE
LIPASE SERPL-CCNC: 57 U/L (ref 23–300)
LYMPHOCYTES # BLD AUTO: 2.05 10*3/MM3 (ref 0.6–3.4)
LYMPHOCYTES NFR BLD AUTO: 37.5 % (ref 10–50)
MCH RBC QN AUTO: 25.7 PG (ref 27–31)
MCHC RBC AUTO-ENTMCNC: 31.6 G/DL (ref 30–37)
MCV RBC AUTO: 81.3 FL (ref 81–99)
MONOCYTES # BLD AUTO: 0.34 10*3/MM3 (ref 0–0.9)
MONOCYTES NFR BLD AUTO: 6.2 % (ref 0–12)
NEUTROPHILS # BLD AUTO: 2.89 10*3/MM3 (ref 2–6.9)
NEUTROPHILS NFR BLD AUTO: 52.8 % (ref 37–80)
NITRITE UR QL STRIP: NEGATIVE
NRBC BLD MANUAL-RTO: 0 /100 WBC (ref 0–0)
PH UR STRIP.AUTO: 7.5 [PH] (ref 5–8)
PLATELET # BLD AUTO: 307 10*3/MM3 (ref 130–400)
PMV BLD AUTO: 10.8 FL (ref 6–12)
POTASSIUM BLD-SCNC: 3.8 MMOL/L (ref 3.5–5.1)
PROT SERPL-MCNC: 7.7 G/DL (ref 6.3–8.2)
PROT UR QL STRIP: NEGATIVE
RBC # BLD AUTO: 4.82 10*6/MM3 (ref 4.2–5.4)
SODIUM BLD-SCNC: 144 MMOL/L (ref 137–145)
SP GR UR STRIP: 1.02 (ref 1–1.03)
UROBILINOGEN UR QL STRIP: NORMAL
WBC NRBC COR # BLD: 5.47 10*3/MM3 (ref 4.8–10.8)
WHOLE BLOOD HOLD SPECIMEN: NORMAL
WHOLE BLOOD HOLD SPECIMEN: NORMAL

## 2017-05-18 PROCEDURE — 74177 CT ABD & PELVIS W/CONTRAST: CPT

## 2017-05-18 PROCEDURE — 96374 THER/PROPH/DIAG INJ IV PUSH: CPT

## 2017-05-18 PROCEDURE — 0 IOPAMIDOL 61 % SOLUTION: Performed by: EMERGENCY MEDICINE

## 2017-05-18 PROCEDURE — 96361 HYDRATE IV INFUSION ADD-ON: CPT

## 2017-05-18 PROCEDURE — 96375 TX/PRO/DX INJ NEW DRUG ADDON: CPT

## 2017-05-18 PROCEDURE — 81003 URINALYSIS AUTO W/O SCOPE: CPT | Performed by: EMERGENCY MEDICINE

## 2017-05-18 PROCEDURE — 99284 EMERGENCY DEPT VISIT MOD MDM: CPT

## 2017-05-18 PROCEDURE — 85025 COMPLETE CBC W/AUTO DIFF WBC: CPT | Performed by: EMERGENCY MEDICINE

## 2017-05-18 PROCEDURE — 25010000002 KETOROLAC TROMETHAMINE PER 15 MG: Performed by: EMERGENCY MEDICINE

## 2017-05-18 PROCEDURE — 25010000002 ONDANSETRON PER 1 MG: Performed by: EMERGENCY MEDICINE

## 2017-05-18 PROCEDURE — 80053 COMPREHEN METABOLIC PANEL: CPT | Performed by: EMERGENCY MEDICINE

## 2017-05-18 PROCEDURE — 83690 ASSAY OF LIPASE: CPT | Performed by: EMERGENCY MEDICINE

## 2017-05-18 RX ORDER — HYDROCODONE BITARTRATE AND ACETAMINOPHEN 7.5; 325 MG/1; MG/1
1 TABLET ORAL EVERY 6 HOURS PRN
Qty: 12 TABLET | Refills: 0 | Status: SHIPPED | OUTPATIENT
Start: 2017-05-18 | End: 2017-06-09

## 2017-05-18 RX ORDER — ONDANSETRON 4 MG/1
4 TABLET, ORALLY DISINTEGRATING ORAL EVERY 6 HOURS PRN
Qty: 10 TABLET | Refills: 0 | Status: SHIPPED | OUTPATIENT
Start: 2017-05-18 | End: 2017-05-26 | Stop reason: SDUPTHER

## 2017-05-18 RX ORDER — KETOROLAC TROMETHAMINE 30 MG/ML
30 INJECTION, SOLUTION INTRAMUSCULAR; INTRAVENOUS ONCE
Status: COMPLETED | OUTPATIENT
Start: 2017-05-18 | End: 2017-05-18

## 2017-05-18 RX ORDER — SODIUM CHLORIDE 0.9 % (FLUSH) 0.9 %
10 SYRINGE (ML) INJECTION AS NEEDED
Status: DISCONTINUED | OUTPATIENT
Start: 2017-05-18 | End: 2017-05-19 | Stop reason: HOSPADM

## 2017-05-18 RX ORDER — ONDANSETRON 2 MG/ML
4 INJECTION INTRAMUSCULAR; INTRAVENOUS ONCE
Status: COMPLETED | OUTPATIENT
Start: 2017-05-18 | End: 2017-05-18

## 2017-05-18 RX ORDER — CIPROFLOXACIN 500 MG/1
500 TABLET, FILM COATED ORAL 2 TIMES DAILY
Qty: 20 TABLET | Refills: 0 | Status: SHIPPED | OUTPATIENT
Start: 2017-05-18 | End: 2017-06-09

## 2017-05-18 RX ORDER — METRONIDAZOLE 500 MG/1
500 TABLET ORAL 2 TIMES DAILY
Qty: 20 TABLET | Refills: 0 | Status: SHIPPED | OUTPATIENT
Start: 2017-05-18 | End: 2017-06-09

## 2017-05-18 RX ADMIN — Medication 10 ML: at 20:22

## 2017-05-18 RX ADMIN — KETOROLAC TROMETHAMINE 30 MG: 30 INJECTION, SOLUTION INTRAMUSCULAR at 20:19

## 2017-05-18 RX ADMIN — SODIUM CHLORIDE 1000 ML: 9 INJECTION, SOLUTION INTRAVENOUS at 20:20

## 2017-05-18 RX ADMIN — ONDANSETRON 4 MG: 2 INJECTION INTRAMUSCULAR; INTRAVENOUS at 20:24

## 2017-05-18 RX ADMIN — IOPAMIDOL 85 ML: 612 INJECTION, SOLUTION INTRAVENOUS at 20:42

## 2017-05-22 ENCOUNTER — OFFICE VISIT (OUTPATIENT)
Dept: OBSTETRICS AND GYNECOLOGY | Facility: CLINIC | Age: 37
End: 2017-05-22

## 2017-05-22 VITALS
HEIGHT: 63 IN | BODY MASS INDEX: 31.54 KG/M2 | DIASTOLIC BLOOD PRESSURE: 70 MMHG | WEIGHT: 178 LBS | SYSTOLIC BLOOD PRESSURE: 128 MMHG

## 2017-05-22 DIAGNOSIS — Z98.890 STATUS POST EXPLORATORY LAPAROTOMY: Primary | ICD-10-CM

## 2017-05-22 DIAGNOSIS — R11.0 NAUSEA: ICD-10-CM

## 2017-05-22 DIAGNOSIS — Z48.89 ENCOUNTER FOR POSTOPERATIVE WOUND CHECK: ICD-10-CM

## 2017-05-22 PROCEDURE — 99024 POSTOP FOLLOW-UP VISIT: CPT | Performed by: PHYSICIAN ASSISTANT

## 2017-05-26 ENCOUNTER — OFFICE VISIT (OUTPATIENT)
Dept: OBSTETRICS AND GYNECOLOGY | Facility: CLINIC | Age: 37
End: 2017-05-26

## 2017-05-26 VITALS
BODY MASS INDEX: 31.01 KG/M2 | WEIGHT: 175 LBS | HEIGHT: 63 IN | DIASTOLIC BLOOD PRESSURE: 66 MMHG | SYSTOLIC BLOOD PRESSURE: 116 MMHG

## 2017-05-26 DIAGNOSIS — R11.2 NON-INTRACTABLE VOMITING WITH NAUSEA, UNSPECIFIED VOMITING TYPE: ICD-10-CM

## 2017-05-26 DIAGNOSIS — Z48.89 ENCOUNTER FOR POSTOPERATIVE WOUND CHECK: Primary | ICD-10-CM

## 2017-05-26 PROCEDURE — 99024 POSTOP FOLLOW-UP VISIT: CPT | Performed by: PHYSICIAN ASSISTANT

## 2017-05-26 RX ORDER — ONDANSETRON 4 MG/1
4 TABLET, ORALLY DISINTEGRATING ORAL EVERY 6 HOURS PRN
Qty: 20 TABLET | Refills: 0 | Status: SHIPPED | OUTPATIENT
Start: 2017-05-26 | End: 2017-06-09 | Stop reason: SDUPTHER

## 2017-06-01 RX ORDER — ONDANSETRON 4 MG/1
4 TABLET, FILM COATED ORAL EVERY 6 HOURS PRN
Qty: 30 TABLET | Refills: 0 | Status: SHIPPED | OUTPATIENT
Start: 2017-06-01 | End: 2017-06-07 | Stop reason: SDUPTHER

## 2017-06-06 ENCOUNTER — APPOINTMENT (OUTPATIENT)
Dept: LAB | Facility: HOSPITAL | Age: 37
End: 2017-06-06

## 2017-06-06 ENCOUNTER — OFFICE VISIT (OUTPATIENT)
Dept: OBSTETRICS AND GYNECOLOGY | Facility: CLINIC | Age: 37
End: 2017-06-06

## 2017-06-06 ENCOUNTER — HOSPITAL ENCOUNTER (OUTPATIENT)
Dept: CT IMAGING | Facility: HOSPITAL | Age: 37
Discharge: HOME | End: 2017-06-06
Admitting: PHYSICIAN ASSISTANT

## 2017-06-06 VITALS
TEMPERATURE: 97.4 F | SYSTOLIC BLOOD PRESSURE: 122 MMHG | BODY MASS INDEX: 31.18 KG/M2 | HEIGHT: 63 IN | WEIGHT: 176 LBS | DIASTOLIC BLOOD PRESSURE: 76 MMHG

## 2017-06-06 DIAGNOSIS — R11.2 NAUSEA AND VOMITING, INTRACTABILITY OF VOMITING NOT SPECIFIED, UNSPECIFIED VOMITING TYPE: Primary | ICD-10-CM

## 2017-06-06 DIAGNOSIS — Z90.722 STATUS POST BILATERAL SALPINGO-OOPHORECTOMY (BSO): ICD-10-CM

## 2017-06-06 LAB
ALBUMIN SERPL-MCNC: 4.2 G/DL (ref 3.5–5)
ALBUMIN/GLOB SERPL: 1.4 G/DL (ref 1–2)
ALP SERPL-CCNC: 71 U/L (ref 38–126)
ALT SERPL W P-5'-P-CCNC: 21 U/L (ref 13–69)
AMYLASE SERPL-CCNC: 83 U/L (ref 30–110)
ANION GAP SERPL CALCULATED.3IONS-SCNC: 16.7 MMOL/L
AST SERPL-CCNC: 16 U/L (ref 15–46)
BILIRUB SERPL-MCNC: 0.3 MG/DL (ref 0.2–1.3)
BUN BLD-MCNC: 10 MG/DL (ref 7–20)
BUN/CREAT SERPL: 14.3 (ref 7.1–23.5)
CALCIUM SPEC-SCNC: 9 MG/DL (ref 8.4–10.2)
CHLORIDE SERPL-SCNC: 105 MMOL/L (ref 98–107)
CO2 SERPL-SCNC: 25 MMOL/L (ref 26–30)
CREAT BLD-MCNC: 0.7 MG/DL (ref 0.6–1.3)
DEPRECATED RDW RBC AUTO: 40.6 FL (ref 37–54)
ERYTHROCYTE [DISTWIDTH] IN BLOOD BY AUTOMATED COUNT: 13.8 % (ref 11.5–14.5)
GFR SERPL CREATININE-BSD FRML MDRD: 95 ML/MIN/1.73
GLOBULIN UR ELPH-MCNC: 3.1 GM/DL
GLUCOSE BLD-MCNC: 116 MG/DL (ref 74–98)
HCT VFR BLD AUTO: 37.5 % (ref 37–47)
HGB BLD-MCNC: 12.1 G/DL (ref 12–16)
LIPASE SERPL-CCNC: 102 U/L (ref 23–300)
MCH RBC QN AUTO: 26.3 PG (ref 27–31)
MCHC RBC AUTO-ENTMCNC: 32.3 G/DL (ref 30–37)
MCV RBC AUTO: 81.5 FL (ref 81–99)
PLATELET # BLD AUTO: 262 10*3/MM3 (ref 130–400)
PMV BLD AUTO: 11.2 FL (ref 6–12)
POTASSIUM BLD-SCNC: 3.7 MMOL/L (ref 3.5–5.1)
PROT SERPL-MCNC: 7.3 G/DL (ref 6.3–8.2)
RBC # BLD AUTO: 4.6 10*6/MM3 (ref 4.2–5.4)
SODIUM BLD-SCNC: 143 MMOL/L (ref 137–145)
WBC NRBC COR # BLD: 7.51 10*3/MM3 (ref 4.8–10.8)

## 2017-06-06 PROCEDURE — 0 DIATRIZOATE MEGLUMINE & SODIUM PER 1 ML: Performed by: PHYSICIAN ASSISTANT

## 2017-06-06 PROCEDURE — 99024 POSTOP FOLLOW-UP VISIT: CPT | Performed by: PHYSICIAN ASSISTANT

## 2017-06-06 PROCEDURE — 82150 ASSAY OF AMYLASE: CPT | Performed by: PHYSICIAN ASSISTANT

## 2017-06-06 PROCEDURE — 36415 COLL VENOUS BLD VENIPUNCTURE: CPT | Performed by: PHYSICIAN ASSISTANT

## 2017-06-06 PROCEDURE — 0 IOPAMIDOL 61 % SOLUTION: Performed by: PHYSICIAN ASSISTANT

## 2017-06-06 PROCEDURE — 74177 CT ABD & PELVIS W/CONTRAST: CPT

## 2017-06-06 PROCEDURE — 80053 COMPREHEN METABOLIC PANEL: CPT | Performed by: PHYSICIAN ASSISTANT

## 2017-06-06 PROCEDURE — 85027 COMPLETE CBC AUTOMATED: CPT | Performed by: PHYSICIAN ASSISTANT

## 2017-06-06 PROCEDURE — 83690 ASSAY OF LIPASE: CPT | Performed by: PHYSICIAN ASSISTANT

## 2017-06-06 RX ADMIN — DIATRIZOATE MEGLUMINE AND DIATRIZOATE SODIUM 30 ML: 660; 100 LIQUID ORAL; RECTAL at 17:15

## 2017-06-06 RX ADMIN — IOPAMIDOL 100 ML: 612 INJECTION, SOLUTION INTRAVENOUS at 17:15

## 2017-06-06 NOTE — PROGRESS NOTES
"Subjective   Chief Complaint   Patient presents with   • Post-op     N&V, unable to keep anything down     /76  Temp 97.4 °F (36.3 °C)  Ht 63\" (160 cm)  Wt 176 lb (79.8 kg)  BMI 31.18 kg/m2   Ange Wade is a 36 y.o. year old  presenting to be seen for post op visit  She is 6 weeks post op exploratory laparotomy, BSO, extensive CARRILLO, appendectomy  Her post op course has been complicated by persistent nausea and vomiting which started about a week after discharge-she had been seen in the emergency room 17 with nausea and vomiting-she had CT scan which showed colitis and was given Flagyl and Cipro which she has finished-she reports she continues to have nausea and emesis-she is tolerating liquids but has been sticking with full liquids-she reports emesis 5-6 x daily  She is having normal bowel function and BM's and has been since surgery  She has had no fever or chills at any time since surgery  She denies any heartburn or indigestion symptoms  She does experience some right lower quadrant  and right mid quadrant pain  She has had no dysuria-no hematuria     She has zofran for prn use      Past Medical History:   Diagnosis Date   • Abnormal ECG ?    [facility]   • Abnormal Pap smear of cervix 1998    Precancerous cells   • Anemia    • History of blood clots     after c section    • History of CT scan    • History of MRI    • Migraine    • Ovarian cyst     Had uterus removed 2015   • Phonophobia    • PMS (premenstrual syndrome)    • Preeclampsia 1-    During my 1st pregnacy   • Urinary tract infection     Only had during last pregnacy   • Varicella    • Vasovagal syncope         Current Outpatient Prescriptions:   •  amitriptyline (ELAVIL) 100 MG tablet, TAKE 2 TABLETS BY MOUTH AT BEDTIME , Disp: 60 tablet, Rfl: 3  •  ondansetron (ZOFRAN) 4 MG tablet, Take 1 tablet by mouth Every 6 (Six) Hours As Needed for Nausea or Vomiting., Disp: 30 " tablet, Rfl: 0  •  SUMAtriptan (IMITREX) 100 MG tablet, Take 1 tablet by mouth one time as needed for up to 1 dose for migraine, Disp: 15 tablet, Rfl: 2  •  Topiramate ER (TROKENDI XR) 50 MG capsule sustained-release 24 hr, Take 50 mg by mouth Daily., Disp: 30 capsule, Rfl: 4  •  ciprofloxacin (CIPRO) 500 MG tablet, Take 1 tablet by mouth 2 (Two) Times a Day., Disp: 20 tablet, Rfl: 0  •  docusate sodium 100 MG capsule, Take 100 mg by mouth 2 (Two) Times a Day As Needed for Constipation., Disp: 20 capsule, Rfl: 0  •  HYDROcodone-acetaminophen (NORCO) 7.5-325 MG per tablet, Take 1 tablet by mouth Every 6 (Six) Hours As Needed for Moderate Pain (4-6)., Disp: 12 tablet, Rfl: 0  •  ibuprofen (ADVIL,MOTRIN) 600 MG tablet, Take 1 tablet by mouth Every 6 (Six) Hours As Needed for Mild Pain (1-3) or Moderate Pain (4-6)., Disp: 60 tablet, Rfl: 0  •  metroNIDAZOLE (FLAGYL) 500 MG tablet, Take 1 tablet by mouth 2 (Two) Times a Day., Disp: 20 tablet, Rfl: 0  •  ondansetron ODT (ZOFRAN-ODT) 4 MG disintegrating tablet, Take 1 tablet by mouth Every 6 (Six) Hours As Needed for Nausea or Vomiting., Disp: 20 tablet, Rfl: 0  •  oxyCODONE-acetaminophen (PERCOCET) 5-325 MG per tablet, Take 2 tablets by mouth Every 4 (Four) Hours As Needed for Moderate Pain (4-6) or Severe Pain (7-10)., Disp: 20 tablet, Rfl: 0  •  promethazine (PHENERGAN) 25 MG tablet, Take 1 tablet by mouth Every 6 (Six) Hours As Needed for Nausea or Vomiting., Disp: 30 tablet, Rfl: 0  No current facility-administered medications for this visit.    Allergies   Allergen Reactions   • Darvon [Propoxyphene] Hives     darvocet      Past Surgical History:   Procedure Laterality Date   • BLADDER REPAIR      with hysterectomy   •  SECTION     •  SECTION     • EXPLORATORY LAPAROTOMY N/A 2017    Procedure: LAPAROTOMY EXPLORATORY with bilateral salpingo-oophorectomy, extensive lysis of adhesions appendectomy, cystoscopy;  Surgeon: Jyothi Ramos MD;   Location: [facility] OR;  Service:    • HYSTERECTOMY  08/13/2016    [facility]   • MOUTH SURGERY  2007   • TOE SURGERY Right 1991    GREAT TOE   • T[age]ILLECTOMY  82 or 83   • TUBAL ABDOMINAL LIGATION  2002   • WISDOM TOOTH EXTRACTION  3-2003      Social History     Social History   • Marital status:      Spouse name: N/A   • Number of children: N/A   • Years of education: N/A     Occupational History   • Not on file.     Social History Main Topics   • Smoking status: Former Smoker     Packs/day: 2.00     Years: 3.00     Types: Cigarettes     Quit date: 2/14/1996   • Smokeless tobacco: Never Used   • Alcohol use No   • Drug use: No   • Sexual activity: Yes     Partners: Male     Birth control/ protection: Surgical     Other Topics Concern   • Not on file     Social History Narrative      Family History   Problem Relation Age of Onset   • Mental illness Other    • Cancer Other    • Diabetes Other    • Hypertension Other    • Heart disease Other    • Stroke Other    • Breast cancer Maternal Grandmother    • Ovarian cancer Maternal Grandmother    • Stroke Maternal Grandmother    • Diabetes Maternal Grandmother    • Breast cancer Maternal Aunt    • Stroke Paternal Grandmother    • Diabetes Paternal Grandmother        The following portions of the patient's history were reviewed and updated as appropriate:problem list, current medications, allergies, past family history, past medical history, past social history and past surgical history.    Review of Systems   Constitutional: Negative for fever.   Gastrointestinal: Positive for nausea and vomiting. Negative for constipation and diarrhea.   Genitourinary: Negative for dysuria, frequency and hematuria.   Musculoskeletal: Negative for arthralgias and myalgias.   Neurological: Positive for headaches.        Objective     Physical Exam   Constitutional: She appears well-developed and well-nourished. She is cooperative.   Abdominal: Soft. Normal appearance and bowel sounds are  normal. She exhibits no distension and no mass. There is no tenderness. There is no rigidity, no guarding and no CVA tenderness.   Genitourinary:   Genitourinary Comments: deferred   Neurological: She is alert.   Skin: Skin is warm, dry and intact.   Psychiatric: She has a normal mood and affect. Her behavior is normal.     Ange was seen today for post-op.    Diagnoses and all orders for this visit:    Nausea and vomiting, intractability of vomiting not specified, unspecified vomiting type  -     CBC (No Diff)  -     Comprehensive Metabolic Panel  -     Amylase  -     Lipase  -     CT Abdomen Pelvis With Contrast    Status post bilateral salpingo-oophorectomy (BSO)             This note was electronically signed.    Nikki Crisostomo PA-C   June 6, 2017

## 2017-06-07 ENCOUNTER — TELEPHONE (OUTPATIENT)
Dept: OBSTETRICS AND GYNECOLOGY | Facility: CLINIC | Age: 37
End: 2017-06-07

## 2017-06-07 DIAGNOSIS — K52.9 COLITIS: Primary | ICD-10-CM

## 2017-06-07 RX ORDER — ONDANSETRON 4 MG/1
4 TABLET, FILM COATED ORAL EVERY 6 HOURS PRN
Qty: 24 TABLET | Refills: 0 | Status: SHIPPED | OUTPATIENT
Start: 2017-06-07 | End: 2017-07-06 | Stop reason: SDUPTHER

## 2017-06-07 NOTE — PROGRESS NOTES
I have reviewed the notes, assessments, and/or procedures performed by Karissa Crisostomo PA-C, I concur with her/his documentation of Ange Wade.

## 2017-06-07 NOTE — TELEPHONE ENCOUNTER
Patient called wanting to know results of CT done yesterday. I was not for sure if you had reviewed it yet. Can you advise?

## 2017-06-09 ENCOUNTER — OFFICE VISIT (OUTPATIENT)
Dept: SURGERY | Facility: CLINIC | Age: 37
End: 2017-06-09

## 2017-06-09 VITALS
OXYGEN SATURATION: 100 % | DIASTOLIC BLOOD PRESSURE: 84 MMHG | HEIGHT: 63 IN | BODY MASS INDEX: 31.18 KG/M2 | SYSTOLIC BLOOD PRESSURE: 108 MMHG | WEIGHT: 176 LBS | RESPIRATION RATE: 16 BRPM | HEART RATE: 98 BPM | TEMPERATURE: 98.9 F

## 2017-06-09 DIAGNOSIS — K52.9 COLITIS: Primary | ICD-10-CM

## 2017-06-09 DIAGNOSIS — R10.31 RIGHT LOWER QUADRANT ABDOMINAL PAIN: ICD-10-CM

## 2017-06-09 DIAGNOSIS — K62.5 RECTAL BLEED: ICD-10-CM

## 2017-06-09 PROCEDURE — 99204 OFFICE O/P NEW MOD 45 MIN: CPT | Performed by: SURGERY

## 2017-06-09 NOTE — PROGRESS NOTES
Patient: Ange Wade    YOB: 1980    Date: 06/09/2017    Primary Care Provider: BRIAN Mcfarland    Reason for Consultation: Colonoscopy    Chief complaint:   Chief Complaint   Patient presents with   • Abdominal Pain     right lower quadrant area.       Subjective .     History of present illness:  I saw the patient in the office today as a consultation for evaluation and treatment of recent colitis, she had a ct scan performed on 06/06/2017, it showed mild colitis, she complains of right lower quadrant area pain which has been ongoing for @ 7 weeks, she stated that she had a recent LUPE with appendectomy performed by Dr. Ramos and the pain began after her surgery, she has completed a course of Flagyl and Cipro, she denies change of bowel habits including dark colored stool and/or visible rectal bleeding.No previous colonoscopy, no significant amount of bleeding seen.    Review of Systems   Constitutional: Negative for chills, fever and unexpected weight change.   HENT: Negative for hearing loss, trouble swallowing and voice change.    Eyes: Negative for visual disturbance.   Respiratory: Negative for apnea, cough, chest tightness, shortness of breath and wheezing.    Cardiovascular: Negative for chest pain, palpitations and leg swelling.   Gastrointestinal: Positive for abdominal pain. Negative for abdominal distention, anal bleeding, blood in stool, constipation, diarrhea, nausea, rectal pain and vomiting.   Endocrine: Negative for cold intolerance and heat intolerance.   Genitourinary: Negative for difficulty urinating, dysuria and flank pain.   Musculoskeletal: Negative for back pain and gait problem.   Skin: Negative for color change, rash and wound.   Neurological: Negative for dizziness, syncope, speech difficulty, weakness, light-headedness, numbness and headaches.   Hematological: Negative for adenopathy. Does not bruise/bleed easily.   Psychiatric/Behavioral: Negative for confusion.  The patient is not nervous/anxious.        History:  Past Medical History:   Diagnosis Date   • Abnormal ECG ?    [facility]   • Abnormal Pap smear of cervix 1998    Precancerous cells   • Anemia    • History of blood clots     after c section    • History of CT scan    • History of MRI    • Migraine    • Ovarian cyst     Had uterus removed 2015   • Phonophobia    • PMS (premenstrual syndrome)    • Preeclampsia 1-    During my 1st pregnacy   • Urinary tract infection     Only had during last pregnacy   • Varicella    • Vasovagal syncope        Past Surgical History:   Procedure Laterality Date   • BLADDER REPAIR      with hysterectomy   •  SECTION     •  SECTION     • EXPLORATORY LAPAROTOMY N/A 2017    Procedure: LAPAROTOMY EXPLORATORY with bilateral salpingo-oophorectomy, extensive lysis of adhesions appendectomy, cystoscopy;  Surgeon: Jyothi Ramos MD;  Location: [facility];  Service:    • HYSTERECTOMY  2016    LUPE   • MOUTH SURGERY     • TOE SURGERY Right 1991    GREAT TOE   • T[age]ILLECTOMY  82 or 83   • TUBAL ABDOMINAL LIGATION     • WISDOM TOOTH EXTRACTION  3-2003       Family History   Problem Relation Age of Onset   • Mental illness Other    • Cancer Other    • Diabetes Other    • Hypertension Other    • Heart disease Other    • Stroke Other    • Breast cancer Maternal Grandmother    • Ovarian cancer Maternal Grandmother    • Stroke Maternal Grandmother    • Diabetes Maternal Grandmother    • Breast cancer Maternal Aunt    • Stroke Paternal Grandmother    • Diabetes Paternal Grandmother        Social History   Substance Use Topics   • Smoking status: Former Smoker     Packs/day: 2.00     Years: 3.00     Types: Cigarettes     Quit date: 1996   • Smokeless tobacco: Never Used   • Alcohol use No       Allergies:  Allergies   Allergen Reactions   • Darvon [Propoxyphene] Hives     darvocet        Medications:    Current Outpatient Prescriptions:   •  amitriptyline (ELAVIL) 100 MG tablet, TAKE 2 TABLETS BY MOUTH AT BEDTIME , Disp: 60 tablet, Rfl: 3  •  docusate sodium 100 MG capsule, Take 100 mg by mouth 2 (Two) Times a Day As Needed for Constipation., Disp: 20 capsule, Rfl: 0  •  ibuprofen (ADVIL,MOTRIN) 600 MG tablet, Take 1 tablet by mouth Every 6 (Six) Hours As Needed for Mild Pain (1-3) or Moderate Pain (4-6)., Disp: 60 tablet, Rfl: 0  •  ondansetron (ZOFRAN) 4 MG tablet, Take 1 tablet by mouth Every 6 (Six) Hours As Needed for Nausea or Vomiting., Disp: 24 tablet, Rfl: 0  •  SUMAtriptan (IMITREX) 100 MG tablet, Take 1 tablet by mouth one time as needed for up to 1 dose for migraine, Disp: 15 tablet, Rfl: 2  •  Topiramate ER (TROKENDI XR) 50 MG capsule sustained-release 24 hr, Take 50 mg by mouth Daily., Disp: 30 capsule, Rfl: 4    Objective     Vital Signs:   Temp:  [98.9 °F (37.2 °C)] 98.9 °F (37.2 °C)  Heart Rate:  [98] 98  Resp:  [16] 16  BP: (108)/(84) 108/84    Physical Exam:   General Appearance:    Alert, cooperative, in no acute distress   Head:    Normocephalic, without obvious abnormality, atraumatic   Eyes:            Lids and lashes normal, conjunctivae and sclerae normal, no   icterus, no pallor, corneas clear, PERRL   Ears:    Ears appear intact with no abnormalities noted   Throat:   No oral lesions, no thrush, oral mucosa moist   Neck:   No adenopathy, supple, trachea midline, no thyromegaly,  no JVD   Lungs:     Clear to auscultation,respirations regular, even and                  unlabored    Heart:    Regular rhythm and normal rate, normal S1 and S2, no            murmur   Abdomen:     no masses, no organomegaly, soft non-tender, non-distended, no guarding, there is no evidence of tenderness   Extremities:   Moves all extremities well, no edema, no cyanosis, no             redness   Pulses:   Pulses palpable and equal bilaterally   Skin:   No bleeding, bruising or rash   Lymph  nodes:   No palpable adenopathy   Neurologic:   Cranial nerves 2 - 12 grossly intact, sensation intact      Results Review:   I reviewed the patient's new clinical results.    Assessment/Plan :    1. Colitis    2. Right lower quadrant abdominal pain    3. Rectal bleed        I recommend a colonoscopy for further evaluation. The procedure was explained as well as the risks which include but are not limited to bleeding, infection, perforation, abdominal pain etc. The patient understands these risks and the procedure and wishes to proceed.     Jaylene Doherty MD  06/09/17

## 2017-06-10 DIAGNOSIS — G43.001 MIGRAINE WITHOUT AURA AND WITH STATUS MIGRAINOSUS, NOT INTRACTABLE: ICD-10-CM

## 2017-06-12 RX ORDER — AMITRIPTYLINE HYDROCHLORIDE 100 MG/1
TABLET, FILM COATED ORAL
Qty: 60 TABLET | Refills: 1 | Status: SHIPPED | OUTPATIENT
Start: 2017-06-12 | End: 2017-07-24 | Stop reason: SDUPTHER

## 2017-06-13 ENCOUNTER — OFFICE VISIT (OUTPATIENT)
Dept: NEUROLOGY | Facility: CLINIC | Age: 37
End: 2017-06-13

## 2017-06-13 ENCOUNTER — OFFICE VISIT (OUTPATIENT)
Dept: FAMILY MEDICINE CLINIC | Facility: CLINIC | Age: 37
End: 2017-06-13

## 2017-06-13 ENCOUNTER — APPOINTMENT (OUTPATIENT)
Dept: PREADMISSION TESTING | Facility: HOSPITAL | Age: 37
End: 2017-06-13

## 2017-06-13 VITALS — WEIGHT: 175 LBS | BODY MASS INDEX: 31.01 KG/M2 | HEIGHT: 63 IN

## 2017-06-13 VITALS
RESPIRATION RATE: 16 BRPM | TEMPERATURE: 97.2 F | WEIGHT: 179.4 LBS | BODY MASS INDEX: 31.79 KG/M2 | HEIGHT: 63 IN | SYSTOLIC BLOOD PRESSURE: 108 MMHG | OXYGEN SATURATION: 100 % | DIASTOLIC BLOOD PRESSURE: 80 MMHG | HEART RATE: 62 BPM

## 2017-06-13 VITALS
DIASTOLIC BLOOD PRESSURE: 74 MMHG | HEIGHT: 63 IN | BODY MASS INDEX: 31.76 KG/M2 | HEART RATE: 76 BPM | SYSTOLIC BLOOD PRESSURE: 110 MMHG | WEIGHT: 179.25 LBS | OXYGEN SATURATION: 99 %

## 2017-06-13 VITALS
DIASTOLIC BLOOD PRESSURE: 74 MMHG | SYSTOLIC BLOOD PRESSURE: 110 MMHG | BODY MASS INDEX: 31.75 KG/M2 | WEIGHT: 179.25 LBS | HEART RATE: 63 BPM | OXYGEN SATURATION: 99 %

## 2017-06-13 DIAGNOSIS — M54.2 NECK PAIN: ICD-10-CM

## 2017-06-13 DIAGNOSIS — G43.011 INTRACTABLE MIGRAINE WITHOUT AURA AND WITH STATUS MIGRAINOSUS: Primary | ICD-10-CM

## 2017-06-13 DIAGNOSIS — G44.209 TENSION HEADACHE: ICD-10-CM

## 2017-06-13 DIAGNOSIS — G47.01 INSOMNIA DUE TO MEDICAL CONDITION: ICD-10-CM

## 2017-06-13 DIAGNOSIS — K52.9 COLITIS: Primary | ICD-10-CM

## 2017-06-13 PROCEDURE — 99213 OFFICE O/P EST LOW 20 MIN: CPT | Performed by: INTERNAL MEDICINE

## 2017-06-13 PROCEDURE — 99214 OFFICE O/P EST MOD 30 MIN: CPT | Performed by: PSYCHIATRY & NEUROLOGY

## 2017-06-13 RX ORDER — METRONIDAZOLE 500 MG/1
500 TABLET ORAL 3 TIMES DAILY
Qty: 30 TABLET | Refills: 0 | Status: SHIPPED | OUTPATIENT
Start: 2017-06-13 | End: 2017-06-27 | Stop reason: SDUPTHER

## 2017-06-13 RX ORDER — CIPROFLOXACIN 500 MG/1
500 TABLET, FILM COATED ORAL 2 TIMES DAILY
Qty: 20 TABLET | Refills: 0 | Status: SHIPPED | OUTPATIENT
Start: 2017-06-13 | End: 2017-06-27 | Stop reason: SDUPTHER

## 2017-06-13 NOTE — PROGRESS NOTES
Subjective   Ange Wade is a 36 y.o. female.     Chief Complaint   Patient presents with   • Abdominal Pain     Right side x 7 weeks    • Vomiting       History of Present Illness   Patient is here with c/o right sided abdominal pain associated with nausea for about 7 weeks since she had underwent exploratory laparotomy with BSO and appendectomy on .  She had a CT abdomen/pelvis done on  which showed mild colitis and patient was prescribed Metronidazole and Ciprofloxacin. Patient was also evaluated by surgery and has been scheduled for colonoscopy on 17.    The following portions of the patient's history were reviewed and updated as appropriate: allergies, current medications, past family history, past medical history, past social history, past surgical history and problem list.    Past Medical History:   Diagnosis Date   • Abnormal ECG ?    [facility]   • Abnormal Pap smear of cervix 1998    Precancerous cells   • Anemia    • History of blood clots     after c section    • History of CT scan    • History of MRI    • Migraine    • Ovarian cyst     Had uterus removed 2015   • Phonophobia    • PMS (premenstrual syndrome)    • Preeclampsia 1-    During my 1st pregnacy   • Urinary tract infection     Only had during last pregnacy   • Varicella    • Vasovagal syncope        Past Surgical History:   Procedure Laterality Date   • APPENDECTOMY     • BLADDER REPAIR      with hysterectomy   •  SECTION     •  SECTION     • EXPLORATORY LAPAROTOMY N/A 2017    Procedure: LAPAROTOMY EXPLORATORY with bilateral salpingo-oophorectomy, extensive lysis of adhesions appendectomy, cystoscopy;  Surgeon: Jyothi Ramos MD;  Location: [facility];  Service:    • HYSTERECTOMY  2016    LUPE   • MOUTH SURGERY     • TOE SURGERY Right 1991    GREAT TOE   • TONSILLECTOMY  82 or 83   • TUBAL ABDOMINAL LIGATION     • WISDOM TOOTH  "EXTRACTION  3-2003       Current Outpatient Prescriptions on File Prior to Visit   Medication Sig Dispense Refill   • amitriptyline (ELAVIL) 100 MG tablet TAKE 2 TABLETS BY MOUTH AT BEDTIME  60 tablet 1   • ondansetron (ZOFRAN) 4 MG tablet Take 1 tablet by mouth Every 6 (Six) Hours As Needed for Nausea or Vomiting. 24 tablet 0   • SUMAtriptan (IMITREX) 100 MG tablet Take 1 tablet by mouth one time as needed for up to 1 dose for migraine 15 tablet 2   • Topiramate ER (TROKENDI XR) 50 MG capsule sustained-release 24 hr Take 50 mg by mouth Daily. 30 capsule 4   • [DISCONTINUED] docusate sodium 100 MG capsule Take 100 mg by mouth 2 (Two) Times a Day As Needed for Constipation. 20 capsule 0   • [DISCONTINUED] ibuprofen (ADVIL,MOTRIN) 600 MG tablet Take 1 tablet by mouth Every 6 (Six) Hours As Needed for Mild Pain (1-3) or Moderate Pain (4-6). 60 tablet 0     No current facility-administered medications on file prior to visit.        Social History     Social History   • Marital status:      Spouse name: N/A   • Number of children: N/A   • Years of education: N/A     Occupational History   • Not on file.     Social History Main Topics   • Smoking status: Former Smoker     Packs/day: 2.00     Years: 3.00     Types: Cigarettes     Quit date: 2/14/1996   • Smokeless tobacco: Never Used   • Alcohol use No   • Drug use: No   • Sexual activity: Yes     Partners: Male     Birth control/ protection: Surgical     Other Topics Concern   • Not on file     Social History Narrative       Review of Systems   Constitutional: Negative for fever.   Gastrointestinal: Positive for abdominal pain, nausea and vomiting. Negative for constipation and diarrhea.   Genitourinary: Negative for dysuria.   Musculoskeletal: Negative for arthralgias and myalgias.   Neurological: Positive for headaches.       Objective   Blood pressure 108/80, pulse 62, temperature 97.2 °F (36.2 °C), temperature source Oral, resp. rate 16, height 63\" (160 cm), " weight 179 lb 6.4 oz (81.4 kg), SpO2 100 %, not currently breastfeeding.    Physical Exam   Constitutional: She is oriented to person, place, and time. She appears well-nourished. No distress.   HENT:   Head: Atraumatic.   Right Ear: External ear normal.   Left Ear: External ear normal.   Eyes: Conjunctivae are normal. Right eye exhibits no discharge. Left eye exhibits no discharge.   Neck: Normal range of motion. Neck supple.   Cardiovascular: Normal rate and regular rhythm.    No murmur heard.  Pulmonary/Chest: Effort normal and breath sounds normal. She has no wheezes. She has no rales.   Abdominal: Soft. Bowel sounds are normal. She exhibits no distension. There is no tenderness.   Neurological: She is alert and oriented to person, place, and time.   Skin: No rash noted. She is not diaphoretic.   Psychiatric: She has a normal mood and affect.   Nursing note and vitals reviewed.    Assessment/Plan   Ange was seen today for abdominal pain and vomiting.    Diagnoses and all orders for this visit:    Colitis  -     metroNIDAZOLE (FLAGYL) 500 MG tablet; Take 1 tablet by mouth 3 (Three) Times a Day.  -     ciprofloxacin (CIPRO) 500 MG tablet; Take 1 tablet by mouth 2 (Two) Times a Day.               Return in about 4 weeks (around 7/11/2017).    There are no Patient Instructions on file for this visit.

## 2017-06-14 NOTE — PROGRESS NOTES
"[facility] NEUROLOGY [place] PROGRESS NOTE  History of Present Illness     Date: 2017    Patient Identification  Ange Wade is a 36 y.o. female.    Patient information was obtained from patient.  History/Exam limitations: none.    Original consultation requested by: MD Chayito      Chief Complaint   Migraine (Pt states sx are \"about the same\". When asked, pt states nothing makes migraines better or worse )      History of Present Illness   Patient is delightful 36-year-old lady who is being followed in neurology clinic for persistent migraine headache despite taking amitriptyline 100 mg at suppertime along with Topamax 50 mg once a day and Imitrex on a when necessary basis and she is taking Zofran for nausea vomiting that is associated with her migraine headache.  Patient underwent Botox injection without improvement.  We have discussed about the use of trigger point injection for headache and neck pain.  I've explained to the patient the side effect profile medication patient expressed understanding    PMH:   Past Medical History:   Diagnosis Date   • Abnormal ECG ?    [facility]   • Abnormal Pap smear of cervix 1998    Precancerous cells   • Anemia    • History of blood clots     after c section    • History of CT scan    • History of MRI    • Migraine    • Ovarian cyst     Had uterus removed 2015   • Phonophobia    • PMS (premenstrual syndrome)    • Preeclampsia 1-    During my 1st pregnacy   • Urinary tract infection     Only had during last pregnacy   • Varicella    • Vasovagal syncope        Past Surgical History:   Past Surgical History:   Procedure Laterality Date   • APPENDECTOMY     • BLADDER REPAIR      with hysterectomy   •  SECTION     •  SECTION     • EXPLORATORY LAPAROTOMY N/A 2017    Procedure: LAPAROTOMY EXPLORATORY with bilateral salpingo-oophorectomy, extensive lysis of adhesions appendectomy, " cystoscopy;  Surgeon: Jyothi Ramos MD;  Location: [facility];  Service:    • HYSTERECTOMY  08/13/2016    LUPE   • MOUTH SURGERY  2007   • TOE SURGERY Right 1991    GREAT TOE   • TONSILLECTOMY  82 or 83   • TUBAL ABDOMINAL LIGATION  2002   • WISDOM TOOTH EXTRACTION  3-2003       Family Hisotry:   Family History   Problem Relation Age of Onset   • Mental illness Other    • Cancer Other    • Diabetes Other    • Hypertension Other    • Heart disease Other    • Stroke Other    • Breast cancer Maternal Grandmother    • Ovarian cancer Maternal Grandmother    • Stroke Maternal Grandmother    • Diabetes Maternal Grandmother    • Breast cancer Maternal Aunt    • Stroke Paternal Grandmother    • Diabetes Paternal Grandmother        Social History:   Social History     Social History   • Marital status:      Spouse name: N/A   • Number of children: N/A   • Years of education: N/A     Occupational History   • Not on file.     Social History Main Topics   • Smoking status: Former Smoker     Packs/day: 2.00     Years: 3.00     Types: Cigarettes     Quit date: 2/14/1996   • Smokeless tobacco: Never Used   • Alcohol use No   • Drug use: No   • Sexual activity: Yes     Partners: Male     Birth control/ protection: Surgical     Other Topics Concern   • Not on file     Social History Narrative       Medications:   Current Outpatient Prescriptions   Medication Sig Dispense Refill   • amitriptyline (ELAVIL) 100 MG tablet TAKE 2 TABLETS BY MOUTH AT BEDTIME  60 tablet 1   • ciprofloxacin (CIPRO) 500 MG tablet Take 1 tablet by mouth 2 (Two) Times a Day. 20 tablet 0   • metroNIDAZOLE (FLAGYL) 500 MG tablet Take 1 tablet by mouth 3 (Three) Times a Day. 30 tablet 0   • ondansetron (ZOFRAN) 4 MG tablet Take 1 tablet by mouth Every 6 (Six) Hours As Needed for Nausea or Vomiting. 24 tablet 0   • SUMAtriptan (IMITREX) 100 MG tablet Take 1 tablet by mouth one time as needed for up to 1 dose for migraine 15 tablet 2   • Topiramate ER (TROKENDI  XR) 50 MG capsule sustained-release 24 hr Take 50 mg by mouth Daily. 30 capsule 4     No current facility-administered medications for this visit.        Allergy:   Allergies   Allergen Reactions   • Darvon [Propoxyphene] Hives     darvocet       Review of Systems:  Review of Systems   Constitutional: Positive for fatigue. Negative for chills and fever.   HENT: Negative for congestion, ear pain, hearing loss, rhinorrhea and sore throat.    Eyes: Negative for pain, discharge and redness.   Respiratory: Negative for cough, shortness of breath, wheezing and stridor.    Cardiovascular: Negative for chest pain, palpitations and leg swelling.   Gastrointestinal: Negative for abdominal pain, constipation, nausea and vomiting.   Endocrine: Negative for cold intolerance, heat intolerance and polyphagia.   Genitourinary: Negative for dysuria, flank pain, frequency and urgency.   Musculoskeletal: Positive for neck pain and neck stiffness. Negative for joint swelling and myalgias.   Skin: Negative for pallor, rash and wound.   Allergic/Immunologic: Negative for environmental allergies.   Neurological: Positive for headaches. Negative for dizziness, tremors, seizures, syncope, facial asymmetry, speech difficulty, weakness, light-headedness and numbness.   Hematological: Negative for adenopathy.   Psychiatric/Behavioral: Positive for sleep disturbance. Negative for confusion and hallucinations. The patient is not nervous/anxious.        Physical Exam     Vitals:    06/13/17 1533   BP: 110/74   Pulse: 63   SpO2: 99%   Weight: 179 lb 4 oz (81.3 kg)     GENERAL: Patient is pleasant, cooperative, appears to be stated age.  Body habitus is endomorphic.  SKIN AND EXTREMITIES:  No skin rashes or lesions are noted.  No cyanosis, clubbing or edema of the extremities.    HEAD:  Head is normocephalic and atraumatic.    NECK: Neck is tender without thyromegaly or adenopathy.  Carotic upstrokes are 1+/4.  No cranial or cervical bruits.  The  neck is supple with a full range of motion.   ENT: palate elevate symmetrically, no evidence of high arch palate, tongue midline erythema in posterior pharynx, Mallampati Classification Class III   CARDIOVASCULAR:  Regular rate and rhythm with normal S1 and S2 without rub or gallop.  RESPIRATORY:  Clear to auscultation without wheezes or crackle   ABDOMEN:  Soft and non-tender, positive bowel sound without hepatosplenomegaly  BACK:  Back is straight without midline defect.    PSYCH:  Higher cortical function/mental status:  The patient is alert.  She is oriented x3 to time, place and person.  Recent and the remote memory appear normal.  The patient has a good fund of knowledge.  There is no visual or auditory hallucination or suicidal or homicidal ideation.  SPEECH:There is no gross evidence of aphasia, dysarthria or agnosia.      CRANIAL NERVES:  Pupils are 4mm, equal round reactive to light, reacting briskly to 2mm without afferent pupillary defect.  Visual fields are intact to confrontation testing.  Fundoscopic examination reveals sharp disk margins with normal vasculature.  No papilledema, hemorrhages or exudates.  Extraocular movements are full and smooth with normal pursuits and saccades.  No nystagmus noted.  The face is symmetric. palate elevate symmetrically, Tongue midline, positive gag reflex. The remainder of the cranial nerves are intact and symmetrical.    MOTOR: Strength is 5/5 throughout with normal tone and bulk with the following exceptions, 4/5 intrinsic muscles of the hands and feet.  No involuntary movements noted.    Deep Tendon Reflexes: are 2/4 and symmetrical in the upper extremities, 2/4 and symmetrical at the knees and 1/4 and symmetrical at the Achilles tendon.  Plantar responses were down-going bilaterally.    SENSATION:  Intact to pinprick, light touch, vibration and proprioception.  Coordination:  The patient normally performs finger-nose-finger, heel-to-knee-to-shin and rapid  alternating movements in symmetrical fashion.    COORDINATION AND GAIT:  The patient walks with a narrow-based gait.  Patient is able to heel-toe and tandem walk forward and backwards without difficulty.    MUSCULOSKELETAL: Range of motion normal, no clubbing, cyanosis, or edema.  No joint swelling.            Studies: I have personally reviewed the following and discussed with the patient.  Results for orders placed or performed in visit on 06/06/17   CBC (No Diff)   Result Value Ref Range    WBC 7.51 4.80 - 10.80 10*3/mm3    RBC 4.60 4.20 - 5.40 10*6/mm3    Hemoglobin 12.1 12.0 - 16.0 g/dL    Hematocrit 37.5 37.0 - 47.0 %    MCV 81.5 81.0 - 99.0 fL    MCH 26.3 (L) 27.0 - 31.0 pg    MCHC 32.3 30.0 - 37.0 g/dL    RDW 13.8 11.5 - 14.5 %    RDW-SD 40.6 37.0 - 54.0 fl    MPV 11.2 6.0 - 12.0 fL    Platelets 262 130 - 400 10*3/mm3   Comprehensive Metabolic Panel   Result Value Ref Range    Glucose 116 (H) 74 - 98 mg/dL    BUN 10 7 - 20 mg/dL    Creatinine 0.70 0.60 - 1.30 mg/dL    Sodium 143 137 - 145 mmol/L    Potassium 3.7 3.5 - 5.1 mmol/L    Chloride 105 98 - 107 mmol/L    CO2 25.0 (L) 26.0 - 30.0 mmol/L    Calcium 9.0 8.4 - 10.2 mg/dL    Total Protein 7.3 6.3 - 8.2 g/dL    Albumin 4.20 3.50 - 5.00 g/dL    ALT (SGPT) 21 13 - 69 U/L    AST (SGOT) 16 15 - 46 U/L    Alkaline Phosphatase 71 38 - 126 U/L    Total Bilirubin 0.3 0.2 - 1.3 mg/dL    eGFR Non African Amer 95 >60 mL/min/1.73    Globulin 3.1 gm/dL    A/G Ratio 1.4 1.0 - 2.0 g/dL    BUN/Creatinine Ratio 14.3 7.1 - 23.5    Anion Gap 16.7 mmol/L   Amylase   Result Value Ref Range    Amylase 83 30 - 110 U/L   Lipase   Result Value Ref Range    Lipase 102 23 - 300 U/L       Records Reviewed: I have personally reviewed her previous medical record.    Ange was seen today for migraine.    Diagnoses and all orders for this visit:    Intractable migraine without aura and with status migrainosus  -     Inject Trigger Points, > 3    Tension headache    Neck  "pain        Treatments:  1.  Counseled patient the importance of having regular sleep wake schedule  2.  Avoid sleep deprivation  3.  Constipation migraine headache as follow  Migraine headaches are a major public health problem affecting more than 28 million persons in this country. Nearly 25 percent of women and 9 percent of men experience disabling migraines.    Migraine treatment depends on the duration and severity of pain, associated symptoms, degree of disability, and initial response to therapy.  A widely prescribed and effective class of medications for migraines is the 5-HT1 receptor-specific agonists (\"triptans\").  Contraindications to their use include ischemic vascular conditions, vasospastic coronary disease, uncontrolled hypertension, or other significant cardiovascular disease.  Following appropriate management of acute migraine, patients should be evaluated for initiation of preventive therapy. Factors that should prompt consideration of preventive therapy include the occurrence of two or more migraines per month with disability lasting three or more days per month; failure of, contraindication for, or adverse events from acute treatments; use of abortive medication more than twice per week; and uncommon migraine conditions (e.g., hemiplegic migraine, migraine with prolonged aura, migrainous infarction). Patient preference and cost also should be considered.  Evidence consistently supports the use of the beta blocker propranolol (Inderal) in migraine prophylaxis. Amitriptyline is a first-line agent for migraine prophylaxis and is the only antidepressant with consistent evidence supporting its effectiveness for this use. Divalproex (Depakote) and sodium valproate are well supported by evidence for use in migraine prevention.  Topamax has also been studies for migraine prophylaxis  in open label studies and double blind studies. Evidence does not support the use of diltiazem (Cardizem) in migraine " prevention, and the evidence for several other calcium channel blockers, such as nifedipine (Procardia), is poor and suggests only modest effect  Menstrual Migraine can present a challenge to clinician.  Estrogen withdrawal has been shown to precipitate migraine headaches, and a sustained elevated level of estrogen will postpone the migraine. Transdermal estrogen started just before menstruation can provide a sustained low level of estrogen, decreasing the degree of estrogen decline, and thus may prevent induction of migraines  4.  Since patient has failed amitriptyline and Topamax and Imitrex and Botox injection will be considered a trigger point injection in the next visit     This Document is signed by Esa Miller MD, FAAN, FAASM   June 13, 20179:18 PM

## 2017-06-15 ENCOUNTER — ANESTHESIA EVENT (OUTPATIENT)
Dept: GASTROENTEROLOGY | Facility: HOSPITAL | Age: 37
End: 2017-06-15

## 2017-06-16 ENCOUNTER — HOSPITAL ENCOUNTER (OUTPATIENT)
Facility: HOSPITAL | Age: 37
Setting detail: HOSPITAL OUTPATIENT SURGERY
Discharge: HOME | End: 2017-06-16
Attending: SURGERY | Admitting: SURGERY

## 2017-06-16 ENCOUNTER — ANESTHESIA (OUTPATIENT)
Dept: GASTROENTEROLOGY | Facility: HOSPITAL | Age: 37
End: 2017-06-16

## 2017-06-16 VITALS
RESPIRATION RATE: 18 BRPM | HEART RATE: 83 BPM | TEMPERATURE: 97.5 F | OXYGEN SATURATION: 97 % | DIASTOLIC BLOOD PRESSURE: 85 MMHG | SYSTOLIC BLOOD PRESSURE: 112 MMHG

## 2017-06-16 DIAGNOSIS — K62.5 RECTAL BLEED: ICD-10-CM

## 2017-06-16 DIAGNOSIS — K52.9 COLITIS: ICD-10-CM

## 2017-06-16 DIAGNOSIS — R10.31 RIGHT LOWER QUADRANT ABDOMINAL PAIN: ICD-10-CM

## 2017-06-16 PROCEDURE — 25010000002 MEPERIDINE PER 100 MG: Performed by: NURSE ANESTHETIST, CERTIFIED REGISTERED

## 2017-06-16 PROCEDURE — 25010000002 MIDAZOLAM PER 1 MG: Performed by: NURSE ANESTHETIST, CERTIFIED REGISTERED

## 2017-06-16 PROCEDURE — 25010000002 PROPOFOL 10 MG/ML EMULSION: Performed by: NURSE ANESTHETIST, CERTIFIED REGISTERED

## 2017-06-16 RX ORDER — MEPERIDINE HYDROCHLORIDE 50 MG/ML
INJECTION INTRAMUSCULAR; INTRAVENOUS; SUBCUTANEOUS AS NEEDED
Status: DISCONTINUED | OUTPATIENT
Start: 2017-06-16 | End: 2017-06-16 | Stop reason: SURG

## 2017-06-16 RX ORDER — SODIUM CHLORIDE 0.9 % (FLUSH) 0.9 %
3 SYRINGE (ML) INJECTION AS NEEDED
Status: DISCONTINUED | OUTPATIENT
Start: 2017-06-16 | End: 2017-06-16 | Stop reason: HOSPADM

## 2017-06-16 RX ORDER — SODIUM CHLORIDE, SODIUM LACTATE, POTASSIUM CHLORIDE, CALCIUM CHLORIDE 600; 310; 30; 20 MG/100ML; MG/100ML; MG/100ML; MG/100ML
1000 INJECTION, SOLUTION INTRAVENOUS CONTINUOUS PRN
Status: DISCONTINUED | OUTPATIENT
Start: 2017-06-16 | End: 2017-06-16 | Stop reason: HOSPADM

## 2017-06-16 RX ORDER — ONDANSETRON 2 MG/ML
4 INJECTION INTRAMUSCULAR; INTRAVENOUS ONCE AS NEEDED
Status: DISCONTINUED | OUTPATIENT
Start: 2017-06-16 | End: 2017-06-16 | Stop reason: HOSPADM

## 2017-06-16 RX ORDER — MIDAZOLAM HYDROCHLORIDE 1 MG/ML
INJECTION INTRAMUSCULAR; INTRAVENOUS AS NEEDED
Status: DISCONTINUED | OUTPATIENT
Start: 2017-06-16 | End: 2017-06-16 | Stop reason: SURG

## 2017-06-16 RX ORDER — PROPOFOL 10 MG/ML
VIAL (ML) INTRAVENOUS AS NEEDED
Status: DISCONTINUED | OUTPATIENT
Start: 2017-06-16 | End: 2017-06-16 | Stop reason: SURG

## 2017-06-16 RX ADMIN — SODIUM CHLORIDE, POTASSIUM CHLORIDE, SODIUM LACTATE AND CALCIUM CHLORIDE 1000 ML: 600; 310; 30; 20 INJECTION, SOLUTION INTRAVENOUS at 06:37

## 2017-06-16 RX ADMIN — MIDAZOLAM HYDROCHLORIDE 2 MG: 1 INJECTION, SOLUTION INTRAMUSCULAR; INTRAVENOUS at 07:26

## 2017-06-16 RX ADMIN — MIDAZOLAM HYDROCHLORIDE 2 MG: 1 INJECTION, SOLUTION INTRAMUSCULAR; INTRAVENOUS at 07:23

## 2017-06-16 RX ADMIN — PROPOFOL 50 MG: 10 INJECTION, EMULSION INTRAVENOUS at 07:27

## 2017-06-16 RX ADMIN — MEPERIDINE HYDROCHLORIDE 50 MG: 50 INJECTION INTRAMUSCULAR; INTRAVENOUS; SUBCUTANEOUS at 07:26

## 2017-06-16 NOTE — ANESTHESIA PREPROCEDURE EVALUATION
Anesthesia Evaluation     Patient summary reviewed and Nursing notes reviewed   NPO Solid Status: > 8 hours  NPO Liquid Status: > 8 hours     Airway   Mallampati: I  TM distance: >3 FB  Neck ROM: full  no difficulty expected  Dental    (+) edentulous    Pulmonary - normal exam   (+) a smoker (Quit 1996) Former,   Cardiovascular - normal exam    (-) hypertension      Neuro/Psych  (+) headaches, syncope, numbness (chronic left sided transient weakness that results in patient fall), psychiatric history (Clostrophobia),    GI/Hepatic/Renal/Endo    (+) obesity (BMI 31),      Musculoskeletal     Abdominal  - normal exam   Substance History      OB/GYN    (-) Preeclampsia and history of pregnancy induced hypertension        Other        ROS/Med Hx Other: +Insomnia      Phys Exam Other: Top dentures                              Anesthesia Plan    ASA 3     MAC   (Risks and benefits discussed including risk of aspiration, recall and dental damage. All patient questions answered. Will continue with POC.)  intravenous induction   Anesthetic plan and risks discussed with patient.

## 2017-06-16 NOTE — H&P (VIEW-ONLY)
Patient: Ange Wade    YOB: 1980    Date: 06/09/2017    Primary Care Provider: BRIAN Mcfarland    Reason for Consultation: Colonoscopy    Chief complaint:   Chief Complaint   Patient presents with   • Abdominal Pain     right lower quadrant area.       Subjective .     History of present illness:  I saw the patient in the office today as a consultation for evaluation and treatment of recent colitis, she had a ct scan performed on 06/06/2017, it showed mild colitis, she complains of right lower quadrant area pain which has been ongoing for @ 7 weeks, she stated that she had a recent LUPE with appendectomy performed by Dr. Ramos and the pain began after her surgery, she has completed a course of Flagyl and Cipro, she denies change of bowel habits including dark colored stool and/or visible rectal bleeding.No previous colonoscopy, no significant amount of bleeding seen.    Review of Systems   Constitutional: Negative for chills, fever and unexpected weight change.   HENT: Negative for hearing loss, trouble swallowing and voice change.    Eyes: Negative for visual disturbance.   Respiratory: Negative for apnea, cough, chest tightness, shortness of breath and wheezing.    Cardiovascular: Negative for chest pain, palpitations and leg swelling.   Gastrointestinal: Positive for abdominal pain. Negative for abdominal distention, anal bleeding, blood in stool, constipation, diarrhea, nausea, rectal pain and vomiting.   Endocrine: Negative for cold intolerance and heat intolerance.   Genitourinary: Negative for difficulty urinating, dysuria and flank pain.   Musculoskeletal: Negative for back pain and gait problem.   Skin: Negative for color change, rash and wound.   Neurological: Negative for dizziness, syncope, speech difficulty, weakness, light-headedness, numbness and headaches.   Hematological: Negative for adenopathy. Does not bruise/bleed easily.   Psychiatric/Behavioral: Negative for confusion.  The patient is not nervous/anxious.        History:  Past Medical History:   Diagnosis Date   • Abnormal ECG ?    [facility]   • Abnormal Pap smear of cervix 1998    Precancerous cells   • Anemia    • History of blood clots     after c section    • History of CT scan    • History of MRI    • Migraine    • Ovarian cyst     Had uterus removed 2015   • Phonophobia    • PMS (premenstrual syndrome)    • Preeclampsia 1-    During my 1st pregnacy   • Urinary tract infection     Only had during last pregnacy   • Varicella    • Vasovagal syncope        Past Surgical History:   Procedure Laterality Date   • BLADDER REPAIR      with hysterectomy   •  SECTION     •  SECTION     • EXPLORATORY LAPAROTOMY N/A 2017    Procedure: LAPAROTOMY EXPLORATORY with bilateral salpingo-oophorectomy, extensive lysis of adhesions appendectomy, cystoscopy;  Surgeon: Jyothi Ramos MD;  Location: [facility];  Service:    • HYSTERECTOMY  2016    LUPE   • MOUTH SURGERY     • TOE SURGERY Right 1991    GREAT TOE   • TONSILLECTOMY  82 or 83   • TUBAL ABDOMINAL LIGATION     • WISDOM TOOTH EXTRACTION  3-2003       Family History   Problem Relation Age of Onset   • Mental illness Other    • Cancer Other    • Diabetes Other    • Hypertension Other    • Heart disease Other    • Stroke Other    • Breast cancer Maternal Grandmother    • Ovarian cancer Maternal Grandmother    • Stroke Maternal Grandmother    • Diabetes Maternal Grandmother    • Breast cancer Maternal Aunt    • Stroke Paternal Grandmother    • Diabetes Paternal Grandmother        Social History   Substance Use Topics   • Smoking status: Former Smoker     Packs/day: 2.00     Years: 3.00     Types: Cigarettes     Quit date: 1996   • Smokeless tobacco: Never Used   • Alcohol use No       Allergies:  Allergies   Allergen Reactions   • Darvon [Propoxyphene] Hives     darvocet        Medications:    Current Outpatient Prescriptions:   •  amitriptyline (ELAVIL) 100 MG tablet, TAKE 2 TABLETS BY MOUTH AT BEDTIME , Disp: 60 tablet, Rfl: 3  •  docusate sodium 100 MG capsule, Take 100 mg by mouth 2 (Two) Times a Day As Needed for Constipation., Disp: 20 capsule, Rfl: 0  •  ibuprofen (ADVIL,MOTRIN) 600 MG tablet, Take 1 tablet by mouth Every 6 (Six) Hours As Needed for Mild Pain (1-3) or Moderate Pain (4-6)., Disp: 60 tablet, Rfl: 0  •  ondansetron (ZOFRAN) 4 MG tablet, Take 1 tablet by mouth Every 6 (Six) Hours As Needed for Nausea or Vomiting., Disp: 24 tablet, Rfl: 0  •  SUMAtriptan (IMITREX) 100 MG tablet, Take 1 tablet by mouth one time as needed for up to 1 dose for migraine, Disp: 15 tablet, Rfl: 2  •  Topiramate ER (TROKENDI XR) 50 MG capsule sustained-release 24 hr, Take 50 mg by mouth Daily., Disp: 30 capsule, Rfl: 4    Objective     Vital Signs:   Temp:  [98.9 °F (37.2 °C)] 98.9 °F (37.2 °C)  Heart Rate:  [98] 98  Resp:  [16] 16  BP: (108)/(84) 108/84    Physical Exam:   General Appearance:    Alert, cooperative, in no acute distress   Head:    Normocephalic, without obvious abnormality, atraumatic   Eyes:            Lids and lashes normal, conjunctivae and sclerae normal, no   icterus, no pallor, corneas clear, PERRL   Ears:    Ears appear intact with no abnormalities noted   Throat:   No oral lesions, no thrush, oral mucosa moist   Neck:   No adenopathy, supple, trachea midline, no thyromegaly,  no JVD   Lungs:     Clear to auscultation,respirations regular, even and                  unlabored    Heart:    Regular rhythm and normal rate, normal S1 and S2, no            murmur   Abdomen:     no masses, no organomegaly, soft non-tender, non-distended, no guarding, there is no evidence of tenderness   Extremities:   Moves all extremities well, no edema, no cyanosis, no             redness   Pulses:   Pulses palpable and equal bilaterally   Skin:   No bleeding, bruising or rash   Lymph  nodes:   No palpable adenopathy   Neurologic:   Cranial nerves 2 - 12 grossly intact, sensation intact      Results Review:   I reviewed the patient's new clinical results.    Assessment/Plan :    1. Colitis    2. Right lower quadrant abdominal pain    3. Rectal bleed        I recommend a colonoscopy for further evaluation. The procedure was explained as well as the risks which include but are not limited to bleeding, infection, perforation, abdominal pain etc. The patient understands these risks and the procedure and wishes to proceed.     Jaylene Doherty MD  06/09/17

## 2017-06-16 NOTE — PLAN OF CARE
Problem: GI Endoscopy (Adult)  Goal: Signs and Symptoms of Listed Potential Problems Will be Absent or Manageable (GI Endoscopy)  Outcome: Ongoing (interventions implemented as appropriate)    06/16/17 0614   GI Endoscopy   Problems Assessed (GI Endoscopy) all   Problems Present (GI Endoscopy) none

## 2017-06-16 NOTE — ANESTHESIA POSTPROCEDURE EVALUATION
Patient: Ange Wade    Procedure Summary     Date Anesthesia Start Anesthesia Stop Room / Location    06/16/17 0719  [facility] ENDOSCOPY 3 / [facility] ENDOSCOPY       Procedure Diagnosis Surgeon Provider    COLONOSCOPY (N/A ) Colitis; Rectal bleed; Right lower quadrant abdominal pain  (Colitis [K52.9]; Rectal bleed [K62.5]; Right lower quadrant abdominal pain [R10.31]) MD Luis Boateng CRNA          Anesthesia Type: general, regional  Last vitals  BP      Temp      Pulse     Resp      SpO2        Post Anesthesia Care and Evaluation    Patient location during evaluation: PHASE II  Patient participation: complete - patient participated  Level of consciousness: awake  Pain score: 0  Pain management: adequate  Airway patency: patent  Anesthetic complications: No anesthetic complications  PONV Status: none  Cardiovascular status: acceptable  Respiratory status: acceptable and nasal cannula  Hydration status: acceptable    Comments: vsss resp spont, reflexes intact, responsive, report given to pacu nurse

## 2017-06-20 ENCOUNTER — PATIENT MESSAGE (OUTPATIENT)
Dept: FAMILY MEDICINE CLINIC | Facility: CLINIC | Age: 37
End: 2017-06-20

## 2017-06-22 LAB
LAB AP CASE REPORT: NORMAL
Lab: NORMAL
PATH REPORT.FINAL DX SPEC: NORMAL

## 2017-06-27 ENCOUNTER — OFFICE VISIT (OUTPATIENT)
Dept: FAMILY MEDICINE CLINIC | Facility: CLINIC | Age: 37
End: 2017-06-27

## 2017-06-27 VITALS
TEMPERATURE: 97.9 F | BODY MASS INDEX: 31.54 KG/M2 | DIASTOLIC BLOOD PRESSURE: 77 MMHG | OXYGEN SATURATION: 100 % | HEIGHT: 63 IN | SYSTOLIC BLOOD PRESSURE: 111 MMHG | WEIGHT: 178 LBS | HEART RATE: 66 BPM

## 2017-06-27 DIAGNOSIS — K52.9 COLITIS: ICD-10-CM

## 2017-06-27 PROCEDURE — 99213 OFFICE O/P EST LOW 20 MIN: CPT | Performed by: INTERNAL MEDICINE

## 2017-06-27 RX ORDER — METRONIDAZOLE 500 MG/1
500 TABLET ORAL 3 TIMES DAILY
Qty: 30 TABLET | Refills: 0 | Status: SHIPPED | OUTPATIENT
Start: 2017-06-27 | End: 2017-08-08

## 2017-06-27 RX ORDER — CIPROFLOXACIN 500 MG/1
500 TABLET, FILM COATED ORAL 2 TIMES DAILY
Qty: 15 TABLET | Refills: 0 | Status: SHIPPED | OUTPATIENT
Start: 2017-06-27 | End: 2017-08-08

## 2017-06-28 ENCOUNTER — OFFICE VISIT (OUTPATIENT)
Dept: SURGERY | Facility: CLINIC | Age: 37
End: 2017-06-28

## 2017-06-28 VITALS
BODY MASS INDEX: 31.53 KG/M2 | TEMPERATURE: 98.4 F | WEIGHT: 178 LBS | SYSTOLIC BLOOD PRESSURE: 112 MMHG | DIASTOLIC BLOOD PRESSURE: 78 MMHG

## 2017-06-28 DIAGNOSIS — K52.9 COLITIS: Primary | ICD-10-CM

## 2017-06-28 PROCEDURE — 99213 OFFICE O/P EST LOW 20 MIN: CPT | Performed by: SURGERY

## 2017-06-28 NOTE — PROGRESS NOTES
Patient: Ange Wade    YOB: 1980    Date: 06/28/2017    Primary Care Provider: Yi Stratton MD    Reason for Consultation: Follow-up colonoscopy    Chief Complaint:   Chief Complaint   Patient presents with   • Follow-up     follow up colonoscopy. c/o pain at rlq and vomiting       History of present illness:  I saw the patient in the office today as a followup from their recent colonoscopy, the pathology report did show focal active colitis.  She complains of vomiting and pain at right lower quadrant, pain does not radiate.Patient still has pain in the right lower quadrant, improved from previous visit.  She also is concerned about a residual cyst in the left pelvis following a total abdominal hysterectomy.  She is scheduled for transvaginal ultrasound with Dr. ortiz next week.  Patient has colitis, self-limiting in the terminal ileum and cecum.  Appears to be resolving, no diarrhea.    Review of Systems   Constitutional: Negative for chills, fatigue and fever.   Respiratory: Negative for cough.    Cardiovascular: Negative for chest pain.   Gastrointestinal: Positive for abdominal pain (rlq) and vomiting. Negative for diarrhea and nausea.       Allergies:  Allergies   Allergen Reactions   • Darvon [Propoxyphene] Hives     darvocet       Medications:    Current Outpatient Prescriptions:   •  amitriptyline (ELAVIL) 100 MG tablet, TAKE 2 TABLETS BY MOUTH AT BEDTIME , Disp: 60 tablet, Rfl: 1  •  ciprofloxacin (CIPRO) 500 MG tablet, Take 1 tablet by mouth 2 (Two) Times a Day., Disp: 15 tablet, Rfl: 0  •  metroNIDAZOLE (FLAGYL) 500 MG tablet, Take 1 tablet by mouth 3 (Three) Times a Day., Disp: 30 tablet, Rfl: 0  •  ondansetron (ZOFRAN) 4 MG tablet, Take 1 tablet by mouth Every 6 (Six) Hours As Needed for Nausea or Vomiting., Disp: 24 tablet, Rfl: 0  •  SUMAtriptan (IMITREX) 100 MG tablet, Take 1 tablet by mouth one time as needed for up to 1 dose for migraine, Disp: 15 tablet, Rfl: 2  •  Topiramate ER  (TROKENDI XR) 50 MG capsule sustained-release 24 hr, Take 50 mg by mouth Daily., Disp: 30 capsule, Rfl: 4    Vital Signs:  Temp:  [97.9 °F (36.6 °C)-98.4 °F (36.9 °C)] 98.4 °F (36.9 °C)  Heart Rate:  [66] 66  BP: (111-112)/(77-78) 112/78    Physical Exam:   General Appearance:    Alert, cooperative, in no acute distress   Abdomen:     no masses, no organomegaly, soft non-tender, non-distended, no guarding, wounds are well healed, no evidence of recurrent hernia.  Mild tenderness right lower quadrant    Chest:      Clear to ausculation            Cor:     Regular rate and rhythm      Assessment / Plan:    1. Colitis    Patient will need repeat colonoscopy in 6 months to evaluate colitis.    I did discuss the situation with the patient today in the office and they have done well from their recent colonoscopy.  I have released the patient back to normal activity.  I need to see the patient back in the office in 6 months and they will need to have repeat colonoscopy at that time.    Electronically signed by Jaylene Doherty MD  06/28/17

## 2017-06-30 ENCOUNTER — TELEPHONE (OUTPATIENT)
Dept: FAMILY MEDICINE CLINIC | Facility: CLINIC | Age: 37
End: 2017-06-30

## 2017-07-03 NOTE — PROGRESS NOTES
Subjective   Ange Wade is a 37 y.o. female.     Chief Complaint   Patient presents with   • Flank Pain     follow up; right side pain and vomiting       History of Present Illness   Patient comes in for f/u.  She had colonoscopy done which showed focal colitis of the terminal ileum. Patient says she feels better since started on Metronidazole and Flagyl.    The following portions of the patient's history were reviewed and updated as appropriate: allergies, current medications, past family history, past medical history, past social history, past surgical history and problem list.    Past Medical History:   Diagnosis Date   • Abnormal ECG ?    [facility]   • Abnormal Pap smear of cervix     Precancerous cells   • Anemia    • History of blood clots     after c section    • History of CT scan    • History of MRI    • Migraine    • Ovarian cyst     Had uterus removed 2015   • Phonophobia    • PMS (premenstrual syndrome)    • Preeclampsia 1-    During my 1st pregnacy   • Urinary tract infection     Only had during last pregnacy   • Varicella    • Vasovagal syncope        Past Surgical History:   Procedure Laterality Date   • APPENDECTOMY     • BLADDER REPAIR      with hysterectomy   •  SECTION     •  SECTION     • COLONOSCOPY N/A 2017    Procedure: COLONOSCOPY WITH BIOPSY;  Surgeon: Jaylene Doherty MD;  Location: [facility] ENDOSCOPY;  Service:    • EXPLORATORY LAPAROTOMY N/A 2017    Procedure: LAPAROTOMY EXPLORATORY with bilateral salpingo-oophorectomy, extensive lysis of adhesions appendectomy, cystoscopy;  Surgeon: Jyothi Ramos MD;  Location: [facility] OR;  Service:    • HYSTERECTOMY  2016    LUPE   • MOUTH SURGERY     • TOE SURGERY Right 1991    GREAT TOE   • TONSILLECTOMY  82 or 83   • TUBAL ABDOMINAL LIGATION     • WISDOM TOOTH EXTRACTION  3-       Current Outpatient Prescriptions on File Prior to Visit    Medication Sig Dispense Refill   • amitriptyline (ELAVIL) 100 MG tablet TAKE 2 TABLETS BY MOUTH AT BEDTIME  60 tablet 1   • ondansetron (ZOFRAN) 4 MG tablet Take 1 tablet by mouth Every 6 (Six) Hours As Needed for Nausea or Vomiting. 24 tablet 0   • SUMAtriptan (IMITREX) 100 MG tablet Take 1 tablet by mouth one time as needed for up to 1 dose for migraine 15 tablet 2   • Topiramate ER (TROKENDI XR) 50 MG capsule sustained-release 24 hr Take 50 mg by mouth Daily. 30 capsule 4     No current facility-administered medications on file prior to visit.        Social History     Social History   • Marital status:      Spouse name: N/A   • Number of children: N/A   • Years of education: N/A     Occupational History   • Not on file.     Social History Main Topics   • Smoking status: Former Smoker     Packs/day: 2.00     Years: 3.00     Types: Cigarettes     Quit date: 2/14/1996   • Smokeless tobacco: Never Used   • Alcohol use No   • Drug use: No   • Sexual activity: Yes     Partners: Male     Birth control/ protection: Surgical     Other Topics Concern   • Not on file     Social History Narrative       Review of Systems   Constitutional: Negative for chills, fatigue and fever.   HENT: Negative for congestion, ear pain, rhinorrhea, sinus pressure and sore throat.    Eyes: Negative for visual disturbance.   Respiratory: Negative for cough, chest tightness, shortness of breath and wheezing.    Cardiovascular: Negative for chest pain, palpitations and leg swelling.   Gastrointestinal: Positive for abdominal pain and diarrhea. Negative for blood in stool, constipation, nausea and vomiting.   Endocrine: Negative for polydipsia and polyuria.   Genitourinary: Negative for dysuria and hematuria.   Musculoskeletal: Negative for back pain.   Skin: Negative for rash.   Neurological: Negative for dizziness, light-headedness, numbness and headaches.   Psychiatric/Behavioral: Negative for dysphoric mood and sleep disturbance. The  "patient is not nervous/anxious.        Objective   Blood pressure 111/77, pulse 66, temperature 97.9 °F (36.6 °C), height 63\" (160 cm), weight 178 lb (80.7 kg), SpO2 100 %, not currently breastfeeding.    Physical Exam   Constitutional: She is oriented to person, place, and time. She appears well-nourished. No distress.   HENT:   Head: Atraumatic.   Right Ear: External ear normal.   Left Ear: External ear normal.   Eyes: Conjunctivae are normal. Right eye exhibits no discharge. Left eye exhibits no discharge.   Neck: Normal range of motion. Neck supple.   Cardiovascular: Normal rate and regular rhythm.    No murmur heard.  Pulmonary/Chest: Effort normal and breath sounds normal. She has no wheezes. She has no rales.   Abdominal: Soft. Bowel sounds are normal. She exhibits no distension. There is no tenderness.   Neurological: She is alert and oriented to person, place, and time.   Skin: No rash noted. She is not diaphoretic.   Psychiatric: She has a normal mood and affect.   Nursing note and vitals reviewed.    Assessment/Plan   Ange was seen today for flank pain.    Diagnoses and all orders for this visit:    Colitis  -     ciprofloxacin (CIPRO) 500 MG tablet; Take 1 tablet by mouth 2 (Two) Times a Day.  -     metroNIDAZOLE (FLAGYL) 500 MG tablet; Take 1 tablet by mouth 3 (Three) Times a Day.               Return in about 4 weeks (around 7/25/2017).    There are no Patient Instructions on file for this visit.  "

## 2017-07-06 ENCOUNTER — TELEPHONE (OUTPATIENT)
Dept: FAMILY MEDICINE CLINIC | Facility: CLINIC | Age: 37
End: 2017-07-06

## 2017-07-06 RX ORDER — ONDANSETRON 4 MG/1
4 TABLET, FILM COATED ORAL EVERY 6 HOURS PRN
Qty: 24 TABLET | Refills: 0 | Status: SHIPPED | OUTPATIENT
Start: 2017-07-06 | End: 2017-07-07 | Stop reason: SDUPTHER

## 2017-07-06 NOTE — TELEPHONE ENCOUNTER
Patient stated that she has been nauseated and vomiting since starting the Cipro and Flagyl.  I told the patient to make sure to ear with her antibiotics.  She says that she is, but she cant keep anything down.  She wants to know if these meds can be changed.

## 2017-07-07 RX ORDER — ONDANSETRON 4 MG/1
TABLET, FILM COATED ORAL
Qty: 24 TABLET | Refills: 0 | Status: SHIPPED | OUTPATIENT
Start: 2017-07-07 | End: 2017-07-12 | Stop reason: SDUPTHER

## 2017-07-12 RX ORDER — ONDANSETRON 4 MG/1
TABLET, FILM COATED ORAL
Qty: 24 TABLET | Refills: 0 | Status: SHIPPED | OUTPATIENT
Start: 2017-07-12 | End: 2017-07-19 | Stop reason: SDUPTHER

## 2017-07-19 RX ORDER — ONDANSETRON 4 MG/1
4 TABLET, FILM COATED ORAL EVERY 8 HOURS PRN
Qty: 21 TABLET | Refills: 0 | Status: SHIPPED | OUTPATIENT
Start: 2017-07-19 | End: 2017-08-08

## 2017-07-22 DIAGNOSIS — G43.001 MIGRAINE WITHOUT AURA AND WITH STATUS MIGRAINOSUS, NOT INTRACTABLE: ICD-10-CM

## 2017-07-24 DIAGNOSIS — G43.001 MIGRAINE WITHOUT AURA AND WITH STATUS MIGRAINOSUS, NOT INTRACTABLE: ICD-10-CM

## 2017-07-24 RX ORDER — AMITRIPTYLINE HYDROCHLORIDE 100 MG/1
200 TABLET, FILM COATED ORAL NIGHTLY
Qty: 60 TABLET | Refills: 1 | Status: SHIPPED | OUTPATIENT
Start: 2017-07-24 | End: 2018-01-05

## 2017-07-24 RX ORDER — TOPIRAMATE 50 MG/1
CAPSULE, EXTENDED RELEASE ORAL
Qty: 30 CAPSULE | Refills: 1 | Status: SHIPPED | OUTPATIENT
Start: 2017-07-24 | End: 2017-10-07 | Stop reason: SDUPTHER

## 2017-07-24 RX ORDER — AMITRIPTYLINE HYDROCHLORIDE 100 MG/1
TABLET, FILM COATED ORAL
Qty: 60 TABLET | Refills: 2 | OUTPATIENT
Start: 2017-07-24

## 2017-07-25 ENCOUNTER — PATIENT MESSAGE (OUTPATIENT)
Dept: FAMILY MEDICINE CLINIC | Facility: CLINIC | Age: 37
End: 2017-07-25

## 2017-07-25 RX ORDER — ONDANSETRON 4 MG/1
TABLET, FILM COATED ORAL
Qty: 24 TABLET | Refills: 0 | OUTPATIENT
Start: 2017-07-25

## 2017-07-27 ENCOUNTER — TELEPHONE (OUTPATIENT)
Dept: FAMILY MEDICINE CLINIC | Facility: CLINIC | Age: 37
End: 2017-07-27

## 2017-07-27 RX ORDER — PROMETHAZINE HYDROCHLORIDE 12.5 MG/1
12.5 TABLET ORAL EVERY 6 HOURS PRN
Qty: 20 TABLET | Refills: 0 | Status: SHIPPED | OUTPATIENT
Start: 2017-07-27 | End: 2017-07-28 | Stop reason: SDUPTHER

## 2017-07-28 RX ORDER — ONDANSETRON 4 MG/1
TABLET, FILM COATED ORAL
Qty: 21 TABLET | Refills: 0 | OUTPATIENT
Start: 2017-07-28

## 2017-07-28 RX ORDER — PROMETHAZINE HYDROCHLORIDE 12.5 MG/1
TABLET ORAL
Qty: 20 TABLET | Refills: 0 | Status: SHIPPED | OUTPATIENT
Start: 2017-07-28 | End: 2017-08-06 | Stop reason: SDUPTHER

## 2017-08-07 RX ORDER — PROMETHAZINE HYDROCHLORIDE 12.5 MG/1
TABLET ORAL
Qty: 20 TABLET | Refills: 0 | Status: SHIPPED | OUTPATIENT
Start: 2017-08-07 | End: 2017-08-09 | Stop reason: SDUPTHER

## 2017-08-08 ENCOUNTER — PATIENT MESSAGE (OUTPATIENT)
Dept: FAMILY MEDICINE CLINIC | Facility: CLINIC | Age: 37
End: 2017-08-08

## 2017-08-08 ENCOUNTER — OFFICE VISIT (OUTPATIENT)
Dept: FAMILY MEDICINE CLINIC | Facility: CLINIC | Age: 37
End: 2017-08-08

## 2017-08-08 ENCOUNTER — HOSPITAL ENCOUNTER (OUTPATIENT)
Dept: ULTRASOUND IMAGING | Facility: HOSPITAL | Age: 37
Discharge: HOME | End: 2017-08-08
Attending: INTERNAL MEDICINE | Admitting: INTERNAL MEDICINE

## 2017-08-08 VITALS
RESPIRATION RATE: 16 BRPM | WEIGHT: 183 LBS | SYSTOLIC BLOOD PRESSURE: 108 MMHG | TEMPERATURE: 97.9 F | BODY MASS INDEX: 32.43 KG/M2 | OXYGEN SATURATION: 100 % | HEIGHT: 63 IN | DIASTOLIC BLOOD PRESSURE: 71 MMHG | HEART RATE: 72 BPM

## 2017-08-08 DIAGNOSIS — R10.32 INTERMITTENT LEFT LOWER QUADRANT ABDOMINAL PAIN: Primary | ICD-10-CM

## 2017-08-08 DIAGNOSIS — R10.11 RIGHT UPPER QUADRANT ABDOMINAL PAIN: ICD-10-CM

## 2017-08-08 DIAGNOSIS — R10.13 EPIGASTRIC ABDOMINAL PAIN: ICD-10-CM

## 2017-08-08 DIAGNOSIS — R11.2 NAUSEA AND VOMITING, INTRACTABILITY OF VOMITING NOT SPECIFIED, UNSPECIFIED VOMITING TYPE: ICD-10-CM

## 2017-08-08 DIAGNOSIS — R10.32 INTERMITTENT LEFT LOWER QUADRANT ABDOMINAL PAIN: ICD-10-CM

## 2017-08-08 LAB
BILIRUB BLD-MCNC: NEGATIVE MG/DL
CLARITY, POC: CLEAR
COLOR UR: YELLOW
GLUCOSE UR STRIP-MCNC: NEGATIVE MG/DL
KETONES UR QL: NEGATIVE
LEUKOCYTE EST, POC: NEGATIVE
NITRITE UR-MCNC: NEGATIVE MG/ML
PH UR: 6 [PH] (ref 5–8)
PROT UR STRIP-MCNC: NEGATIVE MG/DL
RBC # UR STRIP: NEGATIVE /UL
SP GR UR: 1.03 (ref 1–1.03)
UROBILINOGEN UR QL: NORMAL

## 2017-08-08 PROCEDURE — 76705 ECHO EXAM OF ABDOMEN: CPT

## 2017-08-08 PROCEDURE — 99214 OFFICE O/P EST MOD 30 MIN: CPT | Performed by: INTERNAL MEDICINE

## 2017-08-08 PROCEDURE — 81003 URINALYSIS AUTO W/O SCOPE: CPT | Performed by: INTERNAL MEDICINE

## 2017-08-08 NOTE — PROGRESS NOTES
"Subjective   Patient ID: Ange Wade is a 37 y.o. female Pt is here for management of multiple medical problems.    Chief Complaint   Patient presents with   • Flank Pain     patient here for pain on the right lower side x 4 months, patient developed pain after surgery to remove right ovarian cyst on 4/24/17.       History of Present Illness  Surgery 15 weeks ago with Dr Doherty due to appendicitis.     Dr Ramos removed ovary right with surgery with DR Doherty.  S/p hyst Aug 2015.     Pt with cyst on left currently.  Pt was to have had both ovaries removed. Family hx of breast and ovarian cancer.   Maternal Grandma ovarian ca.  Paternal aunt Breast Ca.       Hx of  Migraines.  Takes Trokendi with DR Miller for Migraine nelson.     Hx of cervical cancer 20 yrs ago resolved with hyst.     On depoprovara inj for 4 years.       The following portions of the patient's history were reviewed and updated as appropriate: allergies, current medications, past family history, past medical history, past social history, past surgical history and problem list.      Review of Systems   Constitutional: Negative for fever.   Gastrointestinal: Positive for abdominal pain, nausea and vomiting. Negative for constipation and diarrhea.   Genitourinary: Negative for dysuria, frequency and hematuria.   Musculoskeletal: Negative for arthralgias and myalgias.   Neurological: Positive for headaches.       Objective     /71 (BP Location: Left arm, Patient Position: Sitting, Cuff Size: Adult)  Pulse 72  Temp 97.9 °F (36.6 °C) (Oral)   Resp 16  Ht 63\" (160 cm)  Wt 183 lb (83 kg)  SpO2 100%  BMI 32.42 kg/m2  Physical Exam  General Appearance:    Alert, cooperative, no distress, appears stated age   Head:    Normocephalic, without obvious abnormality, atraumatic   Eyes:    PERRL, conjunctiva/corneas clear, EOM's intact           Ears:    Normal TM's and external ear canals, both ears   Nose:   Nares normal, septum midline, mucosa normal, no " drainage    or sinus tenderness   Throat:   Lips, mucosa, and tongue normal; teeth and gums normal   Neck:   Supple, symmetrical, trachea midline, no adenopathy;        thyroid:  No enlargement/tenderness/nodules; no carotid    bruit or JVD   Back:     Symmetric, no curvature, ROM normal, no CVA tenderness   Lungs:     Clear to auscultation bilaterally, respirations unlabored   Chest wall:    No tenderness or deformity   Heart:    Regular rate and rhythm, S1 and S2 normal, no murmur, rub   or gallop   Abdomen:     Soft, tender to palp mid epi and sig ruq.  Mild ttp left lower quad.   , bowel sounds active all four quadrants,     no masses, no organomegaly           Extremities:   Extremities normal, atraumatic, no cyanosis or edema   Pulses:   2+ and symmetric all extremities   Skin:   Skin color, texture, turgor normal, no rashes or lesions   Lymph nodes:   Cervical, supraclavicular, and axillary nodes normal   Neurologic:   CNII-XII intact. Normal strength, sensation and reflexes       throughout       Assessment/Plan     Ct from 6/6/17 with mild colitis left side with left ovarian cyst.  Path surgical report prior to CT commented on both ovaries.   Ct 1 month earlier colitis on right side.     Going to another colonoscopy in Dec with DR Doherty.        Ange was seen today for flank pain.    Diagnoses and all orders for this visit:    Intermittent left lower quadrant abdominal pain  -     US Gallbladder; Future  -     Lipase  -     H. Pylori Antibody, IgG  -     H. Pylori Antibody, IgA  -     Vitamin B12  -     Comprehensive Metabolic Panel  -     CBC With Manual Differential  -     CBC & Differential  -     Glia(IgA / G) & TTG(IgA / G)  -     5 HIAA, Urine, Quantitative, Random  -     Urinalysis With Microscopic  -     Urine Culture  -     POC Urinalysis Dipstick, Automated  -     Ambulatory Referral to Gynecology    Right upper quadrant abdominal pain  -     US Gallbladder; Future  -     Lipase  -     H. Pylori  Antibody, IgG  -     H. Pylori Antibody, IgA  -     Vitamin B12  -     Comprehensive Metabolic Panel  -     CBC With Manual Differential  -     CBC & Differential  -     Glia(IgA / G) & TTG(IgA / G)  -     5 HIAA, Urine, Quantitative, Random  -     Urinalysis With Microscopic  -     Urine Culture  -     POC Urinalysis Dipstick, Automated  -     Ambulatory Referral to Gynecology    Epigastric abdominal pain  -     US Gallbladder; Future  -     Lipase  -     H. Pylori Antibody, IgG  -     H. Pylori Antibody, IgA  -     Vitamin B12  -     Comprehensive Metabolic Panel  -     CBC With Manual Differential  -     CBC & Differential  -     Glia(IgA / G) & TTG(IgA / G)  -     5 HIAA, Urine, Quantitative, Random  -     Urinalysis With Microscopic  -     Urine Culture  -     POC Urinalysis Dipstick, Automated  -     Ambulatory Referral to Gynecology    Nausea and vomiting, intractability of vomiting not specified, unspecified vomiting type  -     US Gallbladder; Future  -     Lipase  -     H. Pylori Antibody, IgG  -     H. Pylori Antibody, IgA  -     Vitamin B12  -     Comprehensive Metabolic Panel  -     CBC With Manual Differential  -     CBC & Differential  -     Glia(IgA / G) & TTG(IgA / G)  -     5 HIAA, Urine, Quantitative, Random  -     Urinalysis With Microscopic  -     Urine Culture  -     POC Urinalysis Dipstick, Automated  -     Ambulatory Referral to Gynecology      Return in about 3 days (around 8/11/2017).                   There are no Patient Instructions on file for this visit.

## 2017-08-09 DIAGNOSIS — R10.13 EPIGASTRIC PAIN: Primary | ICD-10-CM

## 2017-08-09 RX ORDER — PROMETHAZINE HYDROCHLORIDE 12.5 MG/1
TABLET ORAL
Qty: 20 TABLET | Refills: 0 | Status: SHIPPED | OUTPATIENT
Start: 2017-08-09 | End: 2017-08-11 | Stop reason: SDUPTHER

## 2017-08-09 RX ORDER — NICOTINE POLACRILEX 4 MG/1
20 GUM, CHEWING ORAL DAILY
Qty: 30 EACH | Refills: 11 | Status: SHIPPED | OUTPATIENT
Start: 2017-08-09 | End: 2017-09-13

## 2017-08-10 RX ORDER — AMOXICILLIN AND CLAVULANATE POTASSIUM 875; 125 MG/1; MG/1
1 TABLET, FILM COATED ORAL 2 TIMES DAILY
Qty: 28 TABLET | Refills: 0 | Status: CANCELLED | OUTPATIENT
Start: 2017-08-10

## 2017-08-10 RX ORDER — CLARITHROMYCIN 500 MG/1
500 TABLET, COATED ORAL 2 TIMES DAILY
Qty: 28 TABLET | Refills: 0 | Status: CANCELLED | OUTPATIENT
Start: 2017-08-10

## 2017-08-11 ENCOUNTER — OFFICE VISIT (OUTPATIENT)
Dept: FAMILY MEDICINE CLINIC | Facility: CLINIC | Age: 37
End: 2017-08-11

## 2017-08-11 VITALS
DIASTOLIC BLOOD PRESSURE: 71 MMHG | WEIGHT: 183 LBS | TEMPERATURE: 97.5 F | BODY MASS INDEX: 32.43 KG/M2 | HEIGHT: 63 IN | HEART RATE: 78 BPM | SYSTOLIC BLOOD PRESSURE: 103 MMHG | OXYGEN SATURATION: 100 %

## 2017-08-11 DIAGNOSIS — R11.2 NAUSEA AND VOMITING, INTRACTABILITY OF VOMITING NOT SPECIFIED, UNSPECIFIED VOMITING TYPE: ICD-10-CM

## 2017-08-11 DIAGNOSIS — A04.8 H. PYLORI INFECTION: Primary | ICD-10-CM

## 2017-08-11 DIAGNOSIS — E53.8 VITAMIN B12 DEFICIENCY: ICD-10-CM

## 2017-08-11 DIAGNOSIS — K90.0 CELIAC DISEASE: ICD-10-CM

## 2017-08-11 PROCEDURE — 99214 OFFICE O/P EST MOD 30 MIN: CPT | Performed by: INTERNAL MEDICINE

## 2017-08-11 PROCEDURE — 96372 THER/PROPH/DIAG INJ SC/IM: CPT | Performed by: INTERNAL MEDICINE

## 2017-08-11 RX ORDER — CYANOCOBALAMIN 1000 UG/ML
1000 INJECTION, SOLUTION INTRAMUSCULAR; SUBCUTANEOUS
Status: DISCONTINUED | OUTPATIENT
Start: 2017-08-11 | End: 2017-10-18

## 2017-08-11 RX ORDER — METRONIDAZOLE 500 MG/1
500 TABLET ORAL 3 TIMES DAILY
Qty: 42 TABLET | Refills: 0 | Status: SHIPPED | OUTPATIENT
Start: 2017-08-11 | End: 2017-09-13

## 2017-08-11 RX ORDER — CLARITHROMYCIN 500 MG/1
500 TABLET, COATED ORAL 2 TIMES DAILY
Qty: 28 TABLET | Refills: 0 | Status: SHIPPED | OUTPATIENT
Start: 2017-08-11 | End: 2017-09-13

## 2017-08-11 RX ORDER — PROMETHAZINE HYDROCHLORIDE 12.5 MG/1
25 TABLET ORAL EVERY 6 HOURS PRN
Qty: 20 TABLET | Refills: 0 | Status: SHIPPED | OUTPATIENT
Start: 2017-08-11 | End: 2017-09-13 | Stop reason: SDUPTHER

## 2017-08-11 RX ORDER — AMOXICILLIN AND CLAVULANATE POTASSIUM 875; 125 MG/1; MG/1
1 TABLET, FILM COATED ORAL 2 TIMES DAILY
Qty: 28 TABLET | Refills: 0 | Status: SHIPPED | OUTPATIENT
Start: 2017-08-11 | End: 2017-09-13

## 2017-08-11 RX ADMIN — CYANOCOBALAMIN 1000 MCG: 1000 INJECTION, SOLUTION INTRAMUSCULAR; SUBCUTANEOUS at 09:45

## 2017-08-11 NOTE — PROGRESS NOTES
"Subjective   Patient ID: Ange Wade is a 37 y.o. female Pt request medical management.     Abdominal Pain   This is a recurrent problem. The current episode started more than 1 month ago. The onset quality is gradual. The problem occurs constantly. The most recent episode lasted 24 hours. The problem has been waxing and waning. The pain is located in the RLQ. The pain is at a severity of 4/10. The quality of the pain is cramping. The abdominal pain does not radiate. Associated symptoms include anorexia, headaches, nausea and vomiting. Pertinent negatives include no arthralgias, belching, constipation, diarrhea, dysuria, fever, flatus, frequency, hematochezia, hematuria, melena, myalgias or weight loss. Nothing aggravates the pain. The pain is relieved by nothing. Prior diagnostic workup includes CT scan, lower endoscopy, surgery and ultrasound.      Pt request medical management.     The following portions of the patient's history were reviewed and updated as appropriate: allergies, current medications, past family history, past medical history, past social history, past surgical history and problem list.  Review of Systems   Constitutional: Negative for fever and weight loss.   Gastrointestinal: Positive for abdominal pain, anorexia, nausea and vomiting. Negative for constipation, diarrhea, flatus, hematochezia and melena.   Genitourinary: Negative for dysuria, frequency and hematuria.   Musculoskeletal: Negative for arthralgias and myalgias.   Neurological: Positive for headaches.       /71 (BP Location: Right arm, Patient Position: Sitting)  Pulse 78  Temp 97.5 °F (36.4 °C)  Ht 63\" (160 cm)  Wt 183 lb (83 kg)  SpO2 100%  BMI 32.42 kg/m2    Objective   Physical Exam  General Appearance:    Alert, cooperative, no distress, appears stated age   Head:    Normocephalic, without obvious abnormality, atraumatic   Eyes:    PERRL, conjunctiva/corneas clear, EOM's intact, fundi     benign, both eyes      "   Ears:    Normal TM's and external ear canals, both ears   Nose:   Nares normal, septum midline, mucosa normal, no drainage    or sinus tenderness   Throat:   Lips, mucosa, and tongue normal; teeth and gums normal   Neck:   Supple, symmetrical, trachea midline, no adenopathy;        thyroid:  No enlargement/tenderness/nodules; no carotid    bruit or JVD   Back:     Symmetric, no curvature, ROM normal, no CVA tenderness   Lungs:     Clear to auscultation bilaterally, respirations unlabored   Chest wall:    No tenderness or deformity   Heart:    Regular rate and rhythm, S1 and S2 normal, no murmur, rub   or gallop   Abdomen:     Soft, non-tender, bowel sounds active all four quadrants,     no masses, no organomegaly           Extremities:   Extremities normal, atraumatic, no cyanosis or edema   Pulses:   2+ and symmetric all extremities   Skin:   Skin color, texture, turgor normal, no rashes or lesions   Lymph nodes:   Cervical, supraclavicular, and axillary nodes normal   Neurologic:   CNII-XII intact. Normal strength, sensation and reflexes       throughout     Assessment/Plan       Ange was seen today for abdominal pain.    Diagnoses and all orders for this visit:    H. pylori infection  -     amoxicillin-clavulanate (AUGMENTIN) 875-125 MG per tablet; Take 1 tablet by mouth 2 (Two) Times a Day.  -     clarithromycin (BIAXIN) 500 MG tablet; Take 1 tablet by mouth 2 (Two) Times a Day.  -     metroNIDAZOLE (FLAGYL) 500 MG tablet; Take 1 tablet by mouth 3 (Three) Times a Day.  -     H. Pylori Antigen, Stool    Celiac disease    Nausea and vomiting, intractability of vomiting not specified, unspecified vomiting type  -     promethazine (PHENERGAN) 12.5 MG tablet; Take 2 tablets by mouth Every 6 (Six) Hours As Needed for Nausea or Vomiting.    No Follow-up on file.             There is no immunization history on file for this patient.        There are no Patient Instructions on file for this visit.

## 2017-08-13 NOTE — PROGRESS NOTES
"[facility] NEUROLOGY [place] PROGRESS NOTE  History of Present Illness     Date: 2017    Patient Identification  Ange Wdae is a 37 y.o. female.    Patient information was obtained from patient.  History/Exam limitations: none.    Original consultation requested by: Chase Correa M.D.      Chief Complaint   Migraine (Pt states sx are \"still the same\". When asked, pt states nothing makes migraines better or worse )      History of Present Illness   Patient is a pleasant 37-year-old lady presented to Psychiatric neurology for evaluation of persistent migraine headache.  Patient reported a headache associate with nausea and photophobia and phonophobia and insomnia.     PMH:   Past Medical History:   Diagnosis Date   • Abnormal ECG ?    [facility]   • Abnormal Pap smear of cervix 1998    Precancerous cells   • Anemia    • History of blood clots     after c section    • History of CT scan    • History of MRI    • Migraine    • Ovarian cyst     Had uterus removed 2015   • Phonophobia    • PMS (premenstrual syndrome)    • Preeclampsia 1-    During my 1st pregnacy   • Urinary tract infection     Only had during last pregnacy   • Varicella    • Vasovagal syncope        Past Surgical History:   Past Surgical History:   Procedure Laterality Date   • APPENDECTOMY     • BLADDER REPAIR      with hysterectomy   •  SECTION     •  SECTION     • COLONOSCOPY N/A 2017    Procedure: COLONOSCOPY WITH BIOPSY;  Surgeon: Jaylene Doherty MD;  Location: [facility] ENDOSCOPY;  Service:    • EXPLORATORY LAPAROTOMY N/A 2017    Procedure: LAPAROTOMY EXPLORATORY with bilateral salpingo-oophorectomy, extensive lysis of adhesions appendectomy, cystoscopy;  Surgeon: Jyothi Ramos MD;  Location: [facility] OR;  Service:    • HYSTERECTOMY  2016    LUPE   • MOUTH SURGERY     • TOE SURGERY Right 1991    GREAT TOE   • TONSILLECTOMY  82 or 83   • " TUBAL ABDOMINAL LIGATION  2002   • WISDOM TOOTH EXTRACTION  3-2003       Family Hisotry:   Family History   Problem Relation Age of Onset   • Mental illness Other    • Cancer Other    • Diabetes Other    • Hypertension Other    • Heart disease Other    • Stroke Other    • Breast cancer Maternal Grandmother    • Ovarian cancer Maternal Grandmother    • Stroke Maternal Grandmother    • Diabetes Maternal Grandmother    • Breast cancer Maternal Aunt    • Stroke Paternal Grandmother    • Diabetes Paternal Grandmother        Social History:   Social History     Social History   • Marital status:      Spouse name: N/A   • Number of children: N/A   • Years of education: N/A     Occupational History   • Not on file.     Social History Main Topics   • Smoking status: Former Smoker     Packs/day: 2.00     Years: 3.00     Types: Cigarettes     Quit date: 2/14/1996   • Smokeless tobacco: Never Used   • Alcohol use No   • Drug use: No   • Sexual activity: Yes     Partners: Male     Birth control/ protection: Surgical     Other Topics Concern   • Not on file     Social History Narrative       Medications:   Current Outpatient Prescriptions   Medication Sig Dispense Refill   • SUMAtriptan (IMITREX) 100 MG tablet Take 1 tablet by mouth one time as needed for up to 1 dose for migraine 15 tablet 2   • amitriptyline (ELAVIL) 100 MG tablet Take 2 tablets by mouth Every Night. 60 tablet 1   • amoxicillin-clavulanate (AUGMENTIN) 875-125 MG per tablet Take 1 tablet by mouth 2 (Two) Times a Day. 28 tablet 0   • clarithromycin (BIAXIN) 500 MG tablet Take 1 tablet by mouth 2 (Two) Times a Day. 28 tablet 0   • metroNIDAZOLE (FLAGYL) 500 MG tablet Take 1 tablet by mouth 3 (Three) Times a Day. 42 tablet 0   • Omeprazole (EQ OMEPRAZOLE) 20 MG tablet delayed-release Take 20 mg by mouth Daily. 30 each 11   • promethazine (PHENERGAN) 12.5 MG tablet Take 2 tablets by mouth Every 6 (Six) Hours As Needed for Nausea or Vomiting. 20 tablet 0   •  "TROKENDI XR 50 MG capsule sustained-release 24 hr TAKE 1 CAPSULE BY MOUTH ONE TIME A DAY  30 capsule 1     Current Facility-Administered Medications   Medication Dose Route Frequency Provider Last Rate Last Dose   • cyanocobalamin injection 1,000 mcg  1,000 mcg Intramuscular Q28 Days Chase Correa MD   1,000 mcg at 08/11/17 0945       Allergy:   Allergies   Allergen Reactions   • Darvon [Propoxyphene] Hives     darvocet       Review of Systems:  Review of Systems   Constitutional: Positive for fatigue. Negative for chills and fever.   HENT: Negative for congestion, ear pain, hearing loss, rhinorrhea and sore throat.    Eyes: Negative for pain, discharge and redness.   Respiratory: Negative for cough, shortness of breath, wheezing and stridor.    Cardiovascular: Negative for chest pain, palpitations and leg swelling.   Gastrointestinal: Negative for abdominal pain, constipation, nausea and vomiting.   Endocrine: Negative for cold intolerance, heat intolerance and polyphagia.   Genitourinary: Negative for dysuria, flank pain, frequency and urgency.   Musculoskeletal: Negative for joint swelling, myalgias, neck pain and neck stiffness.   Skin: Negative for pallor, rash and wound.   Allergic/Immunologic: Negative for environmental allergies.   Neurological: Positive for headaches. Negative for dizziness, tremors, seizures, syncope, facial asymmetry, speech difficulty, weakness, light-headedness and numbness.   Hematological: Negative for adenopathy.   Psychiatric/Behavioral: Positive for sleep disturbance. Negative for confusion and hallucinations. The patient is not nervous/anxious.        Physical Exam     Vitals:    06/13/17 0934   BP: 110/74   Pulse: 76   SpO2: 99%   Weight: 179 lb 4 oz (81.3 kg)   Height: 63\" (160 cm)     GENERAL: Patient is pleasant, cooperative, appears to be stated age.  Body habitus is endomorphic.  SKIN AND EXTREMITIES:  No skin rashes or lesions are noted.  No cyanosis, clubbing or edema " of the extremities.    HEAD:  Head is normocephalic and atraumatic.    NECK: Neck are non-tender without thyromegaly or adenopathy.  Carotic upstrokes are 1+/4.  No cranial or cervical bruits.  The neck is supple with a full range of motion.   ENT: palate elevate symmetrically, no evidence of high arch palate, tongue midline erythema in posterior pharynx, Mallampati Classification Class III   CARDIOVASCULAR:  Regular rate and rhythm with normal S1 and S2 without rub or gallop.  RESPIRATORY:  Clear to auscultation without wheezes or crackle   ABDOMEN:  Soft and non-tender, positive bowel sound without hepatosplenomegaly  BACK:  Back is straight without midline defect.    PSYCH:  Higher cortical function/mental status:  The patient is alert.  She is oriented x3 to time, place and person.  Recent and the remote memory appear normal.  The patient has a good fund of knowledge.  There is no visual or auditory hallucination or suicidal or homicidal ideation.  SPEECH:There is no gross evidence of aphasia, dysarthria or agnosia.      CRANIAL NERVES:  Pupils are 4mm, equal round reactive to light, reacting briskly to 2mm without afferent pupillary defect.  Visual fields are intact to confrontation testing.  Fundoscopic examination reveals sharp disk margins with normal vasculature.  No papilledema, hemorrhages or exudates.  Extraocular movements are full and smooth with normal pursuits and saccades.  No nystagmus noted.  The face is symmetric. palate elevate symmetrically, Tongue midline, positive gag reflex. The remainder of the cranial nerves are intact and symmetrical.    MOTOR: Strength is 5/5 throughout with normal tone and bulk with the following exceptions, 4/5 intrinsic muscles of the hands and feet.  No involuntary movements noted.    Deep Tendon Reflexes: are 2/4 and symmetrical in the upper extremities, 2/4 and symmetrical at the knees and 1/4 and symmetrical at the Achilles tendon.  Plantar responses were  "down-going bilaterally.    SENSATION:  Intact to pinprick, light touch, vibration and proprioception.  Coordination:  The patient normally performs finger-nose-finger, heel-to-knee-to-shin and rapid alternating movements in symmetrical fashion.    COORDINATION AND GAIT:  The patient walks with a narrow-based gait.  Patient is able to heel-toe and tandem walk forward and backwards without difficulty.  Romberg and monopedal  Romberg are negative.    MUSCULOSKELETAL: Range of motion normal, no clubbing, cyanosis, or edema.  No joint swelling.            Records Reviewed: I have personally reviewed her previous medical record.    Ange was seen today for migraine.    Diagnoses and all orders for this visit:    Intractable migraine without aura and with status migrainosus    Insomnia due to medical condition        Treatments:    1.  Encourage regular sleep wake schedule  2.  Avoid sleep deprivation  3.  Counseled patient on migraine headache as follow  Migraine Headache  Migraine headaches are a major public health problem affecting more than 28 million persons in this country. Nearly 25 percent of women and 9 percent of men experience disabling migraines.    Migraine treatment depends on the duration and severity of pain, associated symptoms, degree of disability, and initial response to therapy.  A widely prescribed and effective class of medications for migraines is the 5-HT1 receptor-specific agonists (\"triptans\").  Contraindications to their use include ischemic vascular conditions, vasospastic coronary disease, uncontrolled hypertension, or other significant cardiovascular disease.  Following appropriate management of acute migraine, patients should be evaluated for initiation of preventive therapy. Factors that should prompt consideration of preventive therapy include the occurrence of two or more migraines per month with disability lasting three or more days per month; failure of, contraindication for, or " adverse events from acute treatments; use of abortive medication more than twice per week; and uncommon migraine conditions (e.g., hemiplegic migraine, migraine with prolonged aura, migrainous infarction). Patient preference and cost also should be considered.  Evidence consistently supports the use of the beta blocker propranolol (Inderal) in migraine prophylaxis. Amitriptyline is a first-line agent for migraine prophylaxis and is the only antidepressant with consistent evidence supporting its effectiveness for this use. Divalproex (Depakote) and sodium valproate are well supported by evidence for use in migraine prevention.  Topamax has also been studies for migraine prophylaxis  in open label studies and double blind studies. Evidence does not support the use of diltiazem (Cardizem) in migraine prevention, and the evidence for several other calcium channel blockers, such as nifedipine (Procardia), is poor and suggests only modest effect  Menstrual Migraine can present a challenge to clinician.  Estrogen withdrawal has been shown to precipitate migraine headaches, and a sustained elevated level of estrogen will postpone the migraine. Transdermal estrogen started just before menstruation can provide a sustained low level of estrogen, decreasing the degree of estrogen decline, and thus may prevent induction of migraines  4.  Patient will be treated with Imitrex for headache   5.  She is given Trokendi for headache prophylaxis  6.  She is given amitriptyline 100 mg for better sleep at night    This Document is signed by Esa Miller MD, FAAN, FAASM   20177:14 PM

## 2017-08-14 LAB
5OH-INDOLEACETATE UR-MCNC: 2.9 MG/L
5OH-INDOLEACETATE/CREAT UR: 2.5 MG/G CREAT (ref 0–6.9)
ALBUMIN SERPL-MCNC: 4.4 G/DL (ref 3.5–5)
ALBUMIN/GLOB SERPL: 1.5 G/DL (ref 1–2)
ALP SERPL-CCNC: 97 U/L (ref 38–126)
ALT SERPL-CCNC: 38 U/L (ref 13–69)
APPEARANCE UR: CLEAR
AST SERPL-CCNC: 25 U/L (ref 15–46)
BACTERIA #/AREA URNS HPF: ABNORMAL /HPF
BACTERIA UR CULT: NO GROWTH
BACTERIA UR CULT: NORMAL
BASOPHILS # BLD AUTO: 0.06 10*3/MM3 (ref 0–0.2)
BASOPHILS NFR BLD AUTO: 0.9 % (ref 0–2.5)
BILIRUB SERPL-MCNC: 0.4 MG/DL (ref 0.2–1.3)
BILIRUB UR QL STRIP: NEGATIVE
BUN SERPL-MCNC: 12 MG/DL (ref 7–20)
BUN/CREAT SERPL: 17.1 (ref 7.1–23.5)
CALCIUM SERPL-MCNC: 9.2 MG/DL (ref 8.4–10.2)
CASTS URNS MICRO: ABNORMAL
CHLORIDE SERPL-SCNC: 105 MMOL/L (ref 98–107)
CO2 SERPL-SCNC: 25 MMOL/L (ref 26–30)
COLOR UR: YELLOW
CREAT SERPL-MCNC: 0.7 MG/DL (ref 0.6–1.3)
CREAT UR-MCNC: 117.3 MG/DL
EOSINOPHIL # BLD AUTO: 0.1 10*3/MM3 (ref 0–0.7)
EOSINOPHIL NFR BLD AUTO: 1.5 % (ref 0–7)
EPI CELLS #/AREA URNS HPF: ABNORMAL /HPF
ERYTHROCYTE [DISTWIDTH] IN BLOOD BY AUTOMATED COUNT: 14.4 % (ref 11.5–14.5)
GLIADIN PEPTIDE IGA SER-ACNC: 27 UNITS (ref 0–19)
GLIADIN PEPTIDE IGG SER-ACNC: 16 UNITS (ref 0–19)
GLOBULIN SER CALC-MCNC: 3 GM/DL
GLUCOSE SERPL-MCNC: 70 MG/DL (ref 74–98)
GLUCOSE UR QL: NEGATIVE
H PYLORI IGA SER-ACNC: <9 UNITS (ref 0–8.9)
H PYLORI IGG SER IA-ACNC: 1.4 U/ML (ref 0–0.8)
HCT VFR BLD AUTO: 40 % (ref 37–47)
HGB BLD-MCNC: 12.6 G/DL (ref 12–16)
HGB UR QL STRIP: NEGATIVE
IMM GRANULOCYTES # BLD: 0.02 10*3/MM3 (ref 0–0.06)
IMM GRANULOCYTES NFR BLD: 0.3 % (ref 0–0.6)
KETONES UR QL STRIP: NEGATIVE
LEUKOCYTE ESTERASE UR QL STRIP: NEGATIVE
LIPASE SERPL-CCNC: 99 U/L (ref 23–300)
LYMPHOCYTES # BLD AUTO: 1.95 10*3/MM3 (ref 0.6–3.4)
LYMPHOCYTES NFR BLD AUTO: 28.3 % (ref 10–50)
MCH RBC QN AUTO: 25.4 PG (ref 27–31)
MCHC RBC AUTO-ENTMCNC: 31.5 G/DL (ref 30–37)
MCV RBC AUTO: 80.5 FL (ref 81–99)
MONOCYTES # BLD AUTO: 0.4 10*3/MM3 (ref 0–0.9)
MONOCYTES NFR BLD AUTO: 5.8 % (ref 0–12)
NEUTROPHILS # BLD AUTO: 4.35 10*3/MM3 (ref 2–6.9)
NEUTROPHILS NFR BLD AUTO: 63.2 % (ref 37–80)
NITRITE UR QL STRIP: NEGATIVE
NRBC BLD AUTO-RTO: 0 /100 WBC (ref 0–0)
PH UR STRIP: 6 [PH] (ref 5–8)
PLATELET # BLD AUTO: 259 10*3/MM3 (ref 130–400)
POTASSIUM SERPL-SCNC: 4.2 MMOL/L (ref 3.5–5.1)
PROT SERPL-MCNC: 7.4 G/DL (ref 6.3–8.2)
PROT UR QL STRIP: NEGATIVE
RBC # BLD AUTO: 4.97 10*6/MM3 (ref 4.2–5.4)
RBC #/AREA URNS HPF: ABNORMAL /HPF
SODIUM SERPL-SCNC: 143 MMOL/L (ref 137–145)
SP GR UR: (no result) (ref 1–1.03)
TTG IGA SER-ACNC: 6 U/ML (ref 0–3)
TTG IGG SER-ACNC: 2 U/ML (ref 0–5)
UROBILINOGEN UR STRIP-MCNC: (no result) MG/DL
VIT B12 SERPL-MCNC: 232 PG/ML (ref 239–931)
WBC # BLD AUTO: 6.88 10*3/MM3 (ref 4.8–10.8)
WBC #/AREA URNS HPF: ABNORMAL /HPF

## 2017-08-18 RX ORDER — ONDANSETRON 4 MG/1
TABLET, FILM COATED ORAL
Qty: 24 TABLET | Refills: 0 | Status: SHIPPED | OUTPATIENT
Start: 2017-08-18 | End: 2017-11-21

## 2017-08-18 RX ORDER — TOPIRAMATE 50 MG/1
CAPSULE, EXTENDED RELEASE ORAL
Qty: 30 CAPSULE | Refills: 3 | OUTPATIENT
Start: 2017-08-18

## 2017-08-21 ENCOUNTER — HOSPITAL ENCOUNTER (OUTPATIENT)
Dept: NUCLEAR MEDICINE | Facility: HOSPITAL | Age: 37
Discharge: HOME | End: 2017-08-21
Attending: INTERNAL MEDICINE

## 2017-08-21 DIAGNOSIS — G43.001 MIGRAINE WITHOUT AURA AND WITH STATUS MIGRAINOSUS, NOT INTRACTABLE: ICD-10-CM

## 2017-08-21 DIAGNOSIS — R10.13 EPIGASTRIC PAIN: ICD-10-CM

## 2017-08-21 PROCEDURE — 78226 HEPATOBILIARY SYSTEM IMAGING: CPT

## 2017-08-21 PROCEDURE — A9537 TC99M MEBROFENIN: HCPCS | Performed by: INTERNAL MEDICINE

## 2017-08-21 PROCEDURE — 0 TECHNETIUM TC 99M MEBROFENIN KIT: Performed by: INTERNAL MEDICINE

## 2017-08-21 RX ORDER — SUMATRIPTAN 100 MG/1
TABLET, FILM COATED ORAL
Qty: 15 TABLET | Refills: 1 | Status: SHIPPED | OUTPATIENT
Start: 2017-08-21 | End: 2018-01-05

## 2017-08-21 RX ORDER — ONDANSETRON 4 MG/1
TABLET, FILM COATED ORAL
Qty: 24 TABLET | Refills: 0 | Status: SHIPPED | OUTPATIENT
Start: 2017-08-21 | End: 2017-08-28 | Stop reason: SDUPTHER

## 2017-08-21 RX ORDER — KIT FOR THE PREPARATION OF TECHNETIUM TC 99M MEBROFENIN 45 MG/10ML
1 INJECTION, POWDER, LYOPHILIZED, FOR SOLUTION INTRAVENOUS
Status: COMPLETED | OUTPATIENT
Start: 2017-08-21 | End: 2017-08-21

## 2017-08-21 RX ADMIN — MEBROFENIN 1 DOSE: 45 INJECTION, POWDER, LYOPHILIZED, FOR SOLUTION INTRAVENOUS at 09:30

## 2017-08-25 ENCOUNTER — PATIENT MESSAGE (OUTPATIENT)
Dept: FAMILY MEDICINE CLINIC | Facility: CLINIC | Age: 37
End: 2017-08-25

## 2017-08-28 ENCOUNTER — PROCEDURE VISIT (OUTPATIENT)
Dept: NEUROLOGY | Facility: CLINIC | Age: 37
End: 2017-08-28

## 2017-08-28 VITALS
HEART RATE: 72 BPM | HEIGHT: 63 IN | OXYGEN SATURATION: 98 % | WEIGHT: 189 LBS | BODY MASS INDEX: 33.49 KG/M2 | SYSTOLIC BLOOD PRESSURE: 110 MMHG | DIASTOLIC BLOOD PRESSURE: 70 MMHG

## 2017-08-28 DIAGNOSIS — G43.011 INTRACTABLE MIGRAINE WITHOUT AURA AND WITH STATUS MIGRAINOSUS: Primary | ICD-10-CM

## 2017-08-28 PROCEDURE — 20553 NJX 1/MLT TRIGGER POINTS 3/>: CPT | Performed by: PSYCHIATRY & NEUROLOGY

## 2017-08-28 RX ADMIN — METHYLPREDNISOLONE ACETATE 200 MG: 40 INJECTION, SUSPENSION INTRA-ARTICULAR; INTRALESIONAL; INTRAMUSCULAR; SOFT TISSUE at 11:07

## 2017-08-29 DIAGNOSIS — R11.2 NAUSEA AND VOMITING, INTRACTABILITY OF VOMITING NOT SPECIFIED, UNSPECIFIED VOMITING TYPE: ICD-10-CM

## 2017-08-29 RX ORDER — PROMETHAZINE HYDROCHLORIDE 12.5 MG/1
TABLET ORAL
Qty: 20 TABLET | Refills: 0 | OUTPATIENT
Start: 2017-08-29

## 2017-08-29 RX ORDER — ONDANSETRON 4 MG/1
TABLET, FILM COATED ORAL
Qty: 24 TABLET | Refills: 0 | OUTPATIENT
Start: 2017-08-29

## 2017-08-31 RX ORDER — METHYLPREDNISOLONE ACETATE 40 MG/ML
200 INJECTION, SUSPENSION INTRA-ARTICULAR; INTRALESIONAL; INTRAMUSCULAR; SOFT TISSUE ONCE
Status: COMPLETED | OUTPATIENT
Start: 2017-08-31 | End: 2017-08-28

## 2017-08-31 RX ORDER — BUPIVACAINE HYDROCHLORIDE 7.5 MG/ML
5 INJECTION, SOLUTION EPIDURAL; RETROBULBAR ONCE
Status: DISCONTINUED | OUTPATIENT
Start: 2017-08-31 | End: 2017-10-18

## 2017-09-03 LAB — H PYLORI AG STL QL IA: NEGATIVE

## 2017-09-12 ENCOUNTER — OFFICE VISIT (OUTPATIENT)
Dept: OBSTETRICS AND GYNECOLOGY | Facility: CLINIC | Age: 37
End: 2017-09-12

## 2017-09-12 ENCOUNTER — RESULTS ENCOUNTER (OUTPATIENT)
Dept: OBSTETRICS AND GYNECOLOGY | Facility: CLINIC | Age: 37
End: 2017-09-12

## 2017-09-12 VITALS
BODY MASS INDEX: 34.02 KG/M2 | DIASTOLIC BLOOD PRESSURE: 60 MMHG | WEIGHT: 192 LBS | HEIGHT: 63 IN | SYSTOLIC BLOOD PRESSURE: 120 MMHG

## 2017-09-12 DIAGNOSIS — R10.2 PELVIC PAIN: ICD-10-CM

## 2017-09-12 DIAGNOSIS — R10.2 PELVIC PAIN: Primary | ICD-10-CM

## 2017-09-12 PROCEDURE — 99214 OFFICE O/P EST MOD 30 MIN: CPT | Performed by: OBSTETRICS & GYNECOLOGY

## 2017-09-12 NOTE — PROGRESS NOTES
Subjective   Chief Complaint   Patient presents with   • Pelvic Pain     Ange Wade is a 37 y.o. year old .  No LMP recorded. Patient has had a hysterectomy.  She presents to be seen because of Pelvic pain and a possible ovarian cyst noted on CT scan.  Patient underwent expiratory laparotomy bilateral salpingo-oophorectomy and extensive lysis of adhesions with appendectomy in April.  General surgery was involved in this case.  She's had recurrent bouts of nausea and vomiting as well as colitis and continued pelvic pain since then.  General surgery Dr. Mc following these.  Recent CT scan showed a ovarian cyst on the left.  She had ultrasound done here today which shows a cyst versus fluid collection with possible ovarian remnant.  The patient has not been on any hormone replacement therapy and has not had any vasomotor symptoms.     OTHER COMPLAINTS:  Nothing else    The following portions of the patient's history were reviewed and updated as appropriate:  She  has a past medical history of Abnormal ECG (?); Abnormal Pap smear of cervix (); Anemia (); Fibroid uterus; History of blood clots; History of CT scan; History of MRI; History of Papanicolaou smear of cervix (2015); Migraine; Ovarian cyst (); Pelvic adhesive disease; Phonophobia; PMS (premenstrual syndrome) (); Preeclampsia (1-); Urinary tract infection (); Varicella; and Vasovagal syncope.  She  does not have any pertinent problems on file.  She  has a past surgical history that includes  section ();  section (); Toe Surgery (Right, ); Mouth surgery (); Tubal ligation (); Bladder repair (2015); Tonsillectomy ( or 83); [place] tooth extraction (3-2003); Hysterectomy (2016); Exploratory Laparotomy (N/A, 2017); Appendectomy; Colonoscopy (N/A, 2017); and Diagnostic laparoscopy (2015).  Her family history includes Breast cancer in her  maternal aunt and maternal grandmother; Cancer in her other; Diabetes in her maternal grandmother, other, and paternal grandmother; Heart disease in her other; Hypertension in her other; Mental illness in her other; Ovarian cancer in her maternal grandmother; Stroke in her maternal grandmother, other, and paternal grandmother.  She  reports that she quit smoking about 21 years ago. Her smoking use included Cigarettes. She has a 6.00 pack-year smoking history. She has never used smokeless tobacco. She reports that she does not drink alcohol or use illicit drugs.  Current Outpatient Prescriptions   Medication Sig Dispense Refill   • amitriptyline (ELAVIL) 100 MG tablet Take 2 tablets by mouth Every Night. 60 tablet 1   • amoxicillin-clavulanate (AUGMENTIN) 875-125 MG per tablet Take 1 tablet by mouth 2 (Two) Times a Day. 28 tablet 0   • clarithromycin (BIAXIN) 500 MG tablet Take 1 tablet by mouth 2 (Two) Times a Day. 28 tablet 0   • metroNIDAZOLE (FLAGYL) 500 MG tablet Take 1 tablet by mouth 3 (Three) Times a Day. 42 tablet 0   • Omeprazole (EQ OMEPRAZOLE) 20 MG tablet delayed-release Take 20 mg by mouth Daily. 30 each 11   • ondansetron (ZOFRAN) 4 MG tablet TAKE 1 TABLET BY MOUTH EVERY SIX HOURS AS NEEDED FOR NAUSEA AND VOMITING 24 tablet 0   • promethazine (PHENERGAN) 12.5 MG tablet Take 2 tablets by mouth Every 6 (Six) Hours As Needed for Nausea or Vomiting. 20 tablet 0   • SUMAtriptan (IMITREX) 100 MG tablet take 1 tablet by mouth one time AS NEEDED at onset of MIGRAINE - for up to 1 dose 15 tablet 1   • TROKENDI XR 50 MG capsule sustained-release 24 hr TAKE 1 CAPSULE BY MOUTH ONE TIME A DAY  30 capsule 1     Current Facility-Administered Medications   Medication Dose Route Frequency Provider Last Rate Last Dose   • bupivacaine PF (MARCAINE) 0.75 % injection 37.5 mg  5 mL Injection Once Esa Miller MD, FAAN       • cyanocobalamin injection 1,000 mcg  1,000 mcg Intramuscular Q28 Days Chase Correa MD   1,000  mcg at 08/11/17 0945     Current Outpatient Prescriptions on File Prior to Visit   Medication Sig   • amitriptyline (ELAVIL) 100 MG tablet Take 2 tablets by mouth Every Night.   • amoxicillin-clavulanate (AUGMENTIN) 875-125 MG per tablet Take 1 tablet by mouth 2 (Two) Times a Day.   • clarithromycin (BIAXIN) 500 MG tablet Take 1 tablet by mouth 2 (Two) Times a Day.   • metroNIDAZOLE (FLAGYL) 500 MG tablet Take 1 tablet by mouth 3 (Three) Times a Day.   • Omeprazole (EQ OMEPRAZOLE) 20 MG tablet delayed-release Take 20 mg by mouth Daily.   • ondansetron (ZOFRAN) 4 MG tablet TAKE 1 TABLET BY MOUTH EVERY SIX HOURS AS NEEDED FOR NAUSEA AND VOMITING   • promethazine (PHENERGAN) 12.5 MG tablet Take 2 tablets by mouth Every 6 (Six) Hours As Needed for Nausea or Vomiting.   • SUMAtriptan (IMITREX) 100 MG tablet take 1 tablet by mouth one time AS NEEDED at onset of MIGRAINE - for up to 1 dose   • TROKENDI XR 50 MG capsule sustained-release 24 hr TAKE 1 CAPSULE BY MOUTH ONE TIME A DAY      Current Facility-Administered Medications on File Prior to Visit   Medication   • bupivacaine PF (MARCAINE) 0.75 % injection 37.5 mg   • cyanocobalamin injection 1,000 mcg     She is allergic to darvon [propoxyphene].    Smoking status: Former Smoker                                                              Packs/day: 2.00      Years: 3.00         Types: Cigarettes     Quit date: 2/14/1996  Smokeless status: Never Used                        Review of Systems  Consitutional POS: nothing reported    NEG: anorexia or night sweats   Gastointestinal POS: nothing reported    NEG: bloating, change in bowel habits, melena or reflux symptoms   Genitourinary POS: nothing reported    NEG: dysuria or hematuria   Integument POS: nothing reported    NEG: moles that are changing in size, shape, color or rashes   Breast POS: nothing reported    NEG: persistent breast lump, skin dimpling or nipple discharge         Ears, nose, mouth, throat, and face:  "negative  Respiratory: negative  Cardiovascular: negative  Musculoskeletal:negative  Neurological: negative  Behavioral/Psych: negative          Objective   /60  Ht 63\" (160 cm)  Wt 192 lb (87.1 kg)  BMI 34.01 kg/m2    General:  well developed; well nourished  no acute distress  obese - Body mass index is 34.01 kg/(m^2).   Skin:  No suspicious lesions seen   Thyroid: normal to inspection and palpation   Lungs:  breathing is unlabored  clear to auscultation bilaterally   Heart:  regular rate and rhythm, S1, S2 normal, no murmur, click, rub or gallop   Breasts:  Not performed.   Abdomen: soft, non-tender; no masses  no umbilical or inginual hernias are present  no hepato-splenomegaly   Pelvis: Not performed.     Psychiatric: Alert and oriented ×3, mood and affect appropriate  HEENT: Atraumatic, normocephalic, normal scleral icterus  Extremities: 2+ pulses bilaterally, no edema      Lab Review   CBC and CMP    Imaging   Pelvic ultrasound images independantly reviewed - as above        Assessment   1. Question ovarian cyst with continued pelvic pain status post for laparotomy for half months ago.  Explained to the patient nature of her extensive adhesive disease and that this likely could be fluid collection within 2 adhesion planes, however could likely be also a ovarian remnant given the dense adhesions involving the ovaries to the sidewalls.     Plan   1. Initially we'll check patient's hormone levels and have her back in 2 weeks to go over these.  If she is not menopausal and most likely remnant is present and would recommend Lupron for 6 months to see if her symptoms improve.  Again given the significant extensive nature of her adhesions would caution against further surgery if at all possible.       This note was electronically signed.      September 12, 2017      "

## 2017-09-13 ENCOUNTER — OFFICE VISIT (OUTPATIENT)
Dept: FAMILY MEDICINE CLINIC | Facility: CLINIC | Age: 37
End: 2017-09-13

## 2017-09-13 VITALS
BODY MASS INDEX: 34.02 KG/M2 | SYSTOLIC BLOOD PRESSURE: 110 MMHG | DIASTOLIC BLOOD PRESSURE: 75 MMHG | OXYGEN SATURATION: 99 % | WEIGHT: 192 LBS | HEIGHT: 63 IN | TEMPERATURE: 98.5 F | HEART RATE: 72 BPM

## 2017-09-13 DIAGNOSIS — R11.2 NAUSEA AND VOMITING, INTRACTABILITY OF VOMITING NOT SPECIFIED, UNSPECIFIED VOMITING TYPE: ICD-10-CM

## 2017-09-13 DIAGNOSIS — K90.0 CELIAC DISEASE: Primary | ICD-10-CM

## 2017-09-13 DIAGNOSIS — A04.8 H. PYLORI INFECTION: ICD-10-CM

## 2017-09-13 DIAGNOSIS — K52.9 COLITIS: ICD-10-CM

## 2017-09-13 DIAGNOSIS — E53.8 VITAMIN B12 DEFICIENCY: ICD-10-CM

## 2017-09-13 DIAGNOSIS — R10.13 EPIGASTRIC PAIN: ICD-10-CM

## 2017-09-13 LAB
ESTRADIOL SERPL-MCNC: 126.1 PG/ML
FSH SERPL-ACNC: 4 MIU/ML
VIT B12 SERPL-MCNC: 797 PG/ML (ref 239–931)

## 2017-09-13 PROCEDURE — 99214 OFFICE O/P EST MOD 30 MIN: CPT | Performed by: INTERNAL MEDICINE

## 2017-09-13 RX ORDER — PROMETHAZINE HYDROCHLORIDE 12.5 MG/1
25 TABLET ORAL EVERY 6 HOURS PRN
Qty: 20 TABLET | Refills: 0 | Status: SHIPPED | OUTPATIENT
Start: 2017-09-13 | End: 2017-09-18 | Stop reason: SDUPTHER

## 2017-09-13 NOTE — PROGRESS NOTES
"Subjective   Patient ID: Ange Wade is a 37 y.o. female Pt request medical management.     Abdominal Pain   This is a recurrent problem. The onset quality is gradual. The problem occurs constantly. The problem has been unchanged. The pain is located in the RLQ. The pain is at a severity of 6/10. The quality of the pain is aching, cramping and sharp. The abdominal pain does not radiate. Associated symptoms include nausea and vomiting. Pertinent negatives include no anorexia, arthralgias, belching, constipation, diarrhea, dysuria, fever, flatus, frequency, headaches, hematochezia, hematuria, melena, myalgias or weight loss. Nothing aggravates the pain. The pain is relieved by nothing. Prior diagnostic workup includes CT scan, lower endoscopy and surgery.      Pt request medical management.     The following portions of the patient's history were reviewed and updated as appropriate: allergies, current medications, past family history, past medical history, past social history, past surgical history and problem list.  Review of Systems   Constitutional: Negative for fever and weight loss.   Gastrointestinal: Positive for abdominal pain, nausea and vomiting. Negative for anorexia, constipation, diarrhea, flatus, hematochezia and melena.   Genitourinary: Negative for dysuria, frequency and hematuria.   Musculoskeletal: Negative for arthralgias and myalgias.   Neurological: Negative for headaches.       Pt with ab pain.  Less nausea. Working up ovary issue with Dr. Lantigua.  Pt just had labs yesterday.          /75 (BP Location: Left arm, Patient Position: Sitting)  Pulse 72  Temp 98.5 °F (36.9 °C)  Ht 63\" (160 cm)  Wt 192 lb (87.1 kg)  SpO2 99%  BMI 34.01 kg/m2    Objective   Physical Exam  General Appearance:    Alert, cooperative, no distress, appears stated age   Head:    Normocephalic, without obvious abnormality, atraumatic   Eyes:    PERRL, conjunctiva/corneas clear, EOM's intact, fundi     benign, " both eyes        Ears:    Normal TM's and external ear canals, both ears   Nose:   Nares normal, septum midline, mucosa normal, no drainage    or sinus tenderness   Throat:   Lips, mucosa, and tongue normal; teeth and gums normal   Neck:   Supple, symmetrical, trachea midline, no adenopathy;        thyroid:  No enlargement/tenderness/nodules; no carotid    bruit or JVD   Back:     Symmetric, no curvature, ROM normal, no CVA tenderness   Lungs:     Clear to auscultation bilaterally, respirations unlabored   Chest wall:    No tenderness or deformity   Heart:    Regular rate and rhythm, S1 and S2 normal, no murmur, rub   or gallop   Abdomen:     Soft, non-tender, bowel sounds active all four quadrants,     no masses, no organomegaly           Extremities:   Extremities normal, atraumatic, no cyanosis or edema   Pulses:   2+ and symmetric all extremities   Skin:   Skin color, texture, turgor normal, no rashes or lesions   Lymph nodes:   Cervical, supraclavicular, and axillary nodes normal   Neurologic:   CNII-XII intact. Normal strength, sensation and reflexes       throughout     Assessment/Plan   Pt on glutenin free diet.  Pt sneaking gluten. Pt to glutenin free.  If still with pain we may treat h pyori with or without + h pylori stool.     Pt with estorgen detected. She will f/u with Dr campa.          Aneg was seen today for vomiting.    Diagnoses and all orders for this visit:    Celiac disease    Nausea and vomiting, intractability of vomiting not specified, unspecified vomiting type  -     promethazine (PHENERGAN) 12.5 MG tablet; Take 2 tablets by mouth Every 6 (Six) Hours As Needed for Nausea or Vomiting.  -     H. Pylori Antigen, Stool    H. pylori infection  -     H. Pylori Antigen, Stool    Vitamin B12 deficiency  -     Vitamin B12    Epigastric pain    Colitis      Return in about 4 weeks (around 10/11/2017).             There is no immunization history on file for this patient.        There are no  Patient Instructions on file for this visit.

## 2017-09-18 DIAGNOSIS — R11.2 NAUSEA AND VOMITING, INTRACTABILITY OF VOMITING NOT SPECIFIED, UNSPECIFIED VOMITING TYPE: ICD-10-CM

## 2017-09-18 RX ORDER — PROMETHAZINE HYDROCHLORIDE 12.5 MG/1
TABLET ORAL
Qty: 20 TABLET | Refills: 0 | Status: SHIPPED | OUTPATIENT
Start: 2017-09-18 | End: 2017-09-22 | Stop reason: SDUPTHER

## 2017-09-22 DIAGNOSIS — R11.2 NAUSEA AND VOMITING, INTRACTABILITY OF VOMITING NOT SPECIFIED, UNSPECIFIED VOMITING TYPE: ICD-10-CM

## 2017-09-22 RX ORDER — PROMETHAZINE HYDROCHLORIDE 12.5 MG/1
TABLET ORAL
Qty: 20 TABLET | Refills: 0 | Status: SHIPPED | OUTPATIENT
Start: 2017-09-22 | End: 2017-09-27 | Stop reason: SDUPTHER

## 2017-09-26 ENCOUNTER — OFFICE VISIT (OUTPATIENT)
Dept: OBSTETRICS AND GYNECOLOGY | Facility: CLINIC | Age: 37
End: 2017-09-26

## 2017-09-26 VITALS
DIASTOLIC BLOOD PRESSURE: 66 MMHG | BODY MASS INDEX: 33.31 KG/M2 | SYSTOLIC BLOOD PRESSURE: 118 MMHG | HEIGHT: 63 IN | WEIGHT: 188 LBS

## 2017-09-26 DIAGNOSIS — N99.83 OVARIAN REMNANT SYNDROME: Primary | ICD-10-CM

## 2017-09-26 PROCEDURE — 99214 OFFICE O/P EST MOD 30 MIN: CPT | Performed by: OBSTETRICS & GYNECOLOGY

## 2017-09-26 NOTE — PROGRESS NOTES
"Subjective   Chief Complaint   Patient presents with   • Follow-up     LAB results     Ange Wade is a 37 y.o. year old .  No LMP recorded. Patient has had a hysterectomy.  She presents to be seen because of questionable ovarian remnant. FSH and Estradiol do not indicate menpause--continues to complain of pelvic pain and dysparenuia. .     OTHER COMPLAINTS:  Nothing else    The following portions of the patient's history were reviewed and updated as appropriate:current medications and allergies    Smoking status: Former Smoker                                                              Packs/day: 2.00      Years: 3.00         Types: Cigarettes     Quit date: 1996  Smokeless status: Never Used                        Review of Systems  Consitutional POS: nothing reported    NEG: anorexia or night sweats   Gastointestinal POS: nothing reported    NEG: bloating, change in bowel habits, melena or reflux symptoms   Genitourinary POS: nothing reported    NEG: dysuria or hematuria   Integument POS: nothing reported    NEG: moles that are changing in size, shape, color or rashes   Breast POS: nothing reported    NEG: persistent breast lump, skin dimpling or nipple discharge         Pertinent items are noted in HPI.          Objective   /66  Ht 63\" (160 cm)  Wt 188 lb (85.3 kg)  BMI 33.3 kg/m2    General:  well developed; well nourished  no acute distress  appears older than stated age   Skin:  No suspicious lesions seen   Thyroid: not examined   Lungs:  Not performed.   Heart:  Not performed.   Breasts:  Not performed.   Abdomen: soft, non-tender; no masses  no umbilical or inginual hernias are present  no hepato-splenomegaly   Pelvis: Not performed.     Psychiatric: Alert and oriented ×3, mood and affect appropriate  HEENT: Atraumatic, normocephalic, normal scleral icterus  Extremities: 2+ pulses bilaterally, no edema      Lab Review   FSH    Imaging   No data reviewed        Assessment "   1. Ovarian remnant     Plan   1. Discussed Lupron versus conservative mgmt. Patient wants surgical options--after reviewing op report--explained risks significant and would not purse this unless all other options exhausted--Will get GYN ONC's input, but explained they too may be reluctant. She is to thin on other options as well, Spent 25 miniutes of direct face to face time with patient  2. Follow up PRN and for recheck of ovarian cyst 8 weeks     No orders of the defined types were placed in this encounter.         This note was electronically signed.      September 26, 2017

## 2017-09-26 NOTE — PATIENT INSTRUCTIONS
Abdominal or Pelvic Ultrasound  An ultrasound is a test that uses sound waves to take pictures of the inside of the body. An abdominal ultrasound takes pictures of the inside of the abdomen, and a pelvic ultrasound takes pictures of the inside of the pelvis. An abdominal or pelvic ultrasound may be done:  · To provide information about a baby developing in the womb, such as the baby's position and heartbeat.  · To check the shape or size of an organ.  · To check for problems such as:    Cysts.    Masses.    Inflammation.    Kidney stones.    Gallstones.  LET YOUR HEALTH CARE PROVIDER KNOW ABOUT:  · Any allergies you have.  · All medicines you are taking, including vitamins, herbs, eye drops, creams, and over-the-counter medicines.  · Previous surgeries you have had.  · Any medical conditions you have.  · Whether you are pregnant or may be pregnant.  RISKS AND COMPLICATIONS  There are no known risks or complications from having this test.  BEFORE THE PROCEDURE  · Follow instructions from your health care provider about eating or drinking restrictions.  · Wear clothing that is easily washable in case the gel used for the test gets on your clothes.  PROCEDURE  · A gel will be applied to your skin. It may feel cool.  · A wand-like device called a transducer will be placed on the area to be examined.  · The transducer will take pictures. These will be displayed on one or more monitors that look like small TV screens.  AFTER THE PROCEDURE  · It is your responsibility to get your test results. Ask your health care provider or the department performing the test when your results will be ready.  · Keep follow-up visits as told by your health care provider. This is important.     This information is not intended to replace advice given to you by your health care provider. Make sure you discuss any questions you have with your health care provider.     Document Released: 12/15/2001 Document Revised: 04/10/2017 Document  Reviewed: 09/09/2016  [employer] Interactive Patient Education ©2017 [employer]

## 2017-09-27 DIAGNOSIS — R11.2 NAUSEA AND VOMITING, INTRACTABILITY OF VOMITING NOT SPECIFIED, UNSPECIFIED VOMITING TYPE: ICD-10-CM

## 2017-09-28 RX ORDER — PROMETHAZINE HYDROCHLORIDE 12.5 MG/1
TABLET ORAL
Qty: 20 TABLET | Refills: 0 | Status: SHIPPED | OUTPATIENT
Start: 2017-09-28 | End: 2017-10-09 | Stop reason: SDUPTHER

## 2017-10-05 ENCOUNTER — PATIENT MESSAGE (OUTPATIENT)
Dept: FAMILY MEDICINE CLINIC | Facility: CLINIC | Age: 37
End: 2017-10-05

## 2017-10-08 LAB — H PYLORI AG STL QL IA: NEGATIVE

## 2017-10-09 DIAGNOSIS — R11.2 NAUSEA AND VOMITING, INTRACTABILITY OF VOMITING NOT SPECIFIED, UNSPECIFIED VOMITING TYPE: ICD-10-CM

## 2017-10-10 RX ORDER — PROMETHAZINE HYDROCHLORIDE 12.5 MG/1
TABLET ORAL
Qty: 20 TABLET | Refills: 0 | Status: SHIPPED | OUTPATIENT
Start: 2017-10-10 | End: 2017-10-12 | Stop reason: SDUPTHER

## 2017-10-10 RX ORDER — TOPIRAMATE 50 MG/1
CAPSULE, EXTENDED RELEASE ORAL
Qty: 30 CAPSULE | Refills: 1 | Status: SHIPPED | OUTPATIENT
Start: 2017-10-10 | End: 2017-11-30 | Stop reason: SDUPTHER

## 2017-10-11 ENCOUNTER — OFFICE VISIT (OUTPATIENT)
Dept: FAMILY MEDICINE CLINIC | Facility: CLINIC | Age: 37
End: 2017-10-11

## 2017-10-11 ENCOUNTER — OFFICE VISIT (OUTPATIENT)
Dept: OBSTETRICS AND GYNECOLOGY | Facility: CLINIC | Age: 37
End: 2017-10-11

## 2017-10-11 VITALS
BODY MASS INDEX: 33.31 KG/M2 | HEIGHT: 63 IN | SYSTOLIC BLOOD PRESSURE: 122 MMHG | DIASTOLIC BLOOD PRESSURE: 64 MMHG | WEIGHT: 188 LBS

## 2017-10-11 VITALS
BODY MASS INDEX: 33.84 KG/M2 | RESPIRATION RATE: 16 BRPM | WEIGHT: 191 LBS | SYSTOLIC BLOOD PRESSURE: 109 MMHG | HEIGHT: 63 IN | HEART RATE: 76 BPM | OXYGEN SATURATION: 100 % | TEMPERATURE: 98.3 F | DIASTOLIC BLOOD PRESSURE: 80 MMHG

## 2017-10-11 DIAGNOSIS — K90.0 CELIAC DISEASE: ICD-10-CM

## 2017-10-11 DIAGNOSIS — N99.83 OVARIAN REMNANT SYNDROME: Primary | ICD-10-CM

## 2017-10-11 DIAGNOSIS — R11.2 NAUSEA AND VOMITING, INTRACTABILITY OF VOMITING NOT SPECIFIED, UNSPECIFIED VOMITING TYPE: Primary | ICD-10-CM

## 2017-10-11 PROCEDURE — 99214 OFFICE O/P EST MOD 30 MIN: CPT | Performed by: INTERNAL MEDICINE

## 2017-10-11 PROCEDURE — 99213 OFFICE O/P EST LOW 20 MIN: CPT | Performed by: OBSTETRICS & GYNECOLOGY

## 2017-10-11 RX ORDER — NICOTINE POLACRILEX 4 MG/1
20 GUM, CHEWING ORAL DAILY
Qty: 30 EACH | Refills: 11 | Status: SHIPPED | OUTPATIENT
Start: 2017-10-11

## 2017-10-11 NOTE — PROGRESS NOTES
Subjective     Patient ID: Ange Wade is a 37 y.o. female. Patient is here for management of multiple medical problems.     Chief Complaint   Patient presents with   • Vomiting     patient still having vomiting and pain on the right side     History of Present Illness     Pt with left ovary. Pt going to [facility] soon.      Still with  Pain on right ab. Still with pelvic pain.    no otc.     No dysuria. No   Pt is gluten free and still vomiting.   No diarrhea.  No constipation.   No change in BM pre and post celiac dx.       The following portions of the patient's history were reviewed and updated as appropriate: allergies, current medications, past family history, past medical history, past social history, past surgical history and problem list.    Review of Systems   Constitutional: Negative for fatigue.   Respiratory: Negative for shortness of breath.    Gastrointestinal: Positive for nausea and vomiting.   Genitourinary: Negative for frequency and urgency.   All other systems reviewed and are negative.      Current Outpatient Prescriptions:   •  amitriptyline (ELAVIL) 100 MG tablet, Take 2 tablets by mouth Every Night., Disp: 60 tablet, Rfl: 1  •  ondansetron (ZOFRAN) 4 MG tablet, TAKE 1 TABLET BY MOUTH EVERY SIX HOURS AS NEEDED FOR NAUSEA AND VOMITING, Disp: 24 tablet, Rfl: 0  •  promethazine (PHENERGAN) 12.5 MG tablet, TAKE 2 TABLETS BY MOUTH EVERY SIX HOURS AS NEEDED FOR NAUSEA AND VOMITING, Disp: 20 tablet, Rfl: 0  •  SUMAtriptan (IMITREX) 100 MG tablet, take 1 tablet by mouth one time AS NEEDED at onset of MIGRAINE - for up to 1 dose, Disp: 15 tablet, Rfl: 1  •  TROKENDI XR 50 MG capsule sustained-release 24 hr, TAKE 1 CAPSULE BY MOUTH ONE TIME A DAY , Disp: 30 capsule, Rfl: 1  •  Omeprazole (EQ OMEPRAZOLE) 20 MG tablet delayed-release, Take 20 mg by mouth Daily., Disp: 30 each, Rfl: 11    Current Facility-Administered Medications:   •  bupivacaine PF (MARCAINE) 0.75 % injection 37.5 mg, 5 mL,  "Injection, Once, Esa Miller MD, FAAN  •  cyanocobalamin injection 1,000 mcg, 1,000 mcg, Intramuscular, Q28 Days, Chase Correa MD, 1,000 mcg at 08/11/17 0945    Objective      Blood pressure 109/80, pulse 76, temperature 98.3 °F (36.8 °C), temperature source Oral, resp. rate 16, height 63\" (160 cm), weight 191 lb (86.6 kg), SpO2 100 %, not currently breastfeeding.    Physical Exam     General Appearance:    Alert, cooperative, no distress, appears stated age   Head:    Normocephalic, without obvious abnormality, atraumatic   Eyes:    PERRL, conjunctiva/corneas clear, EOM's intact   Ears:    Normal TM's and external ear canals, both ears   Nose:   Nares normal, septum midline, mucosa normal, no drainage   or sinus tenderness   Throat:   Lips, mucosa, and tongue normal; teeth and gums normal   Neck:   Supple, symmetrical, trachea midline, no adenopathy;        thyroid:  No enlargement/tenderness/nodules; no carotid    bruit or JVD   Back:     Symmetric, no curvature, ROM normal, no CVA tenderness   Lungs:     Clear to auscultation bilaterally, respirations unlabored   Chest wall:    No tenderness or deformity   Heart:    Regular rate and rhythm, S1 and S2 normal, no murmur,        rub or gallop   Abdomen:     Soft, non-tender, bowel sounds active all four quadrants,     no masses, no organomegaly   Extremities:   Extremities normal, atraumatic, no cyanosis or edema   Pulses:   2+ and symmetric all extremities   Skin:   Skin color, texture, turgor normal, no rashes or lesions   Lymph nodes:   Cervical, supraclavicular, and axillary nodes normal   Neurologic:   CNII-XII intact. Normal strength, sensation and reflexes       throughout      Results for orders placed or performed in visit on 09/13/17   H. Pylori Antigen, Stool   Result Value Ref Range    H pylori Ag, Stl Negative Negative   Vitamin B12   Result Value Ref Range    Vitamin B-12 797 239 - 931 pg/mL         Assessment/Plan       Ange was seen today for " vomiting.    Diagnoses and all orders for this visit:    Nausea and vomiting, intractability of vomiting not specified, unspecified vomiting type  -     Ambulatory Referral to General Surgery    Celiac disease  -     Ambulatory Referral to General Surgery    Other orders  -     Omeprazole (EQ OMEPRAZOLE) 20 MG tablet delayed-release; Take 20 mg by mouth Daily.      Return in about 3 months (around 1/11/2018).          There are no Patient Instructions on file for this visit.     Chase Correa MD    Assessment/Plan           Ange was seen today for vomiting.    Diagnoses and all orders for this visit:    Nausea and vomiting, intractability of vomiting not specified, unspecified vomiting type  -     Ambulatory Referral to General Surgery    Celiac disease  -     Ambulatory Referral to General Surgery    Other orders  -     Omeprazole (EQ OMEPRAZOLE) 20 MG tablet delayed-release; Take 20 mg by mouth Daily.      Return in about 3 months (around 1/11/2018).                   There are no Patient Instructions on file for this visit.

## 2017-10-11 NOTE — PROGRESS NOTES
Subjective   Chief Complaint   Patient presents with   • Follow-up     TVS- Ovarian Cyst & Pelvic Pain     Ange Wade is a 37 y.o. year old .  No LMP recorded. Patient has had a hysterectomy.  She presents to be seen because of f/u ovarian cyst. ONC appt made.     OTHER COMPLAINTS:  Nothing else    The following portions of the patient's history were reviewed and updated as appropriate:  She  has a past medical history of Abnormal ECG (?); Abnormal Pap smear of cervix (); Anemia (); Fibroid uterus; History of blood clots; History of CT scan; History of MRI; History of Papanicolaou smear of cervix (2015); Migraine; Ovarian cyst (); Pelvic adhesive disease; Phonophobia; PMS (premenstrual syndrome) (); Preeclampsia (1-); Urinary tract infection (); Varicella; and Vasovagal syncope.  She  does not have any pertinent problems on file.  She  has a past surgical history that includes  section ();  section (); Toe Surgery (Right, ); Mouth surgery (); Tubal ligation (); Bladder repair (2015); Tonsillectomy ( or ); [place] tooth extraction (3-2003); Hysterectomy (2016); Exploratory Laparotomy (N/A, 2017); Appendectomy; Colonoscopy (N/A, 2017); and Diagnostic laparoscopy (2015).  Her family history includes Breast cancer in her maternal aunt and maternal grandmother; Cancer in her other; Diabetes in her maternal grandmother, other, and paternal grandmother; Heart disease in her other; Hypertension in her other; Mental illness in her other; Ovarian cancer in her maternal grandmother; Stroke in her maternal grandmother, other, and paternal grandmother.  She  reports that she quit smoking about 21 years ago. Her smoking use included Cigarettes. She has a 6.00 pack-year smoking history. She has never used smokeless tobacco. She reports that she does not drink alcohol or use illicit drugs.  Current Outpatient  Prescriptions   Medication Sig Dispense Refill   • amitriptyline (ELAVIL) 100 MG tablet Take 2 tablets by mouth Every Night. 60 tablet 1   • ondansetron (ZOFRAN) 4 MG tablet TAKE 1 TABLET BY MOUTH EVERY SIX HOURS AS NEEDED FOR NAUSEA AND VOMITING 24 tablet 0   • promethazine (PHENERGAN) 12.5 MG tablet TAKE 2 TABLETS BY MOUTH EVERY SIX HOURS AS NEEDED FOR NAUSEA AND VOMITING 20 tablet 0   • SUMAtriptan (IMITREX) 100 MG tablet take 1 tablet by mouth one time AS NEEDED at onset of MIGRAINE - for up to 1 dose 15 tablet 1   • TROKENDI XR 50 MG capsule sustained-release 24 hr TAKE 1 CAPSULE BY MOUTH ONE TIME A DAY  30 capsule 1     Current Facility-Administered Medications   Medication Dose Route Frequency Provider Last Rate Last Dose   • bupivacaine PF (MARCAINE) 0.75 % injection 37.5 mg  5 mL Injection Once Esa Miller MD, FAAN       • cyanocobalamin injection 1,000 mcg  1,000 mcg Intramuscular Q28 Days Chase Correa MD   1,000 mcg at 08/11/17 0945     Current Outpatient Prescriptions on File Prior to Visit   Medication Sig   • amitriptyline (ELAVIL) 100 MG tablet Take 2 tablets by mouth Every Night.   • ondansetron (ZOFRAN) 4 MG tablet TAKE 1 TABLET BY MOUTH EVERY SIX HOURS AS NEEDED FOR NAUSEA AND VOMITING   • promethazine (PHENERGAN) 12.5 MG tablet TAKE 2 TABLETS BY MOUTH EVERY SIX HOURS AS NEEDED FOR NAUSEA AND VOMITING   • SUMAtriptan (IMITREX) 100 MG tablet take 1 tablet by mouth one time AS NEEDED at onset of MIGRAINE - for up to 1 dose   • TROKENDI XR 50 MG capsule sustained-release 24 hr TAKE 1 CAPSULE BY MOUTH ONE TIME A DAY      Current Facility-Administered Medications on File Prior to Visit   Medication   • bupivacaine PF (MARCAINE) 0.75 % injection 37.5 mg   • cyanocobalamin injection 1,000 mcg     She is allergic to darvon [propoxyphene].    Smoking status: Former Smoker                                                              Packs/day: 2.00      Years: 3.00         Types: Cigarettes     Quit  "date: 2/14/1996  Smokeless status: Never Used                        Review of Systems  Consitutional POS: nothing reported    NEG: anorexia or night sweats   Gastointestinal POS: nothing reported    NEG: bloating, change in bowel habits, melena or reflux symptoms   Genitourinary POS: nothing reported    NEG: dysuria or hematuria   Integument POS: nothing reported    NEG: moles that are changing in size, shape, color or rashes   Breast POS: nothing reported    NEG: persistent breast lump, skin dimpling or nipple discharge         Ears, nose, mouth, throat, and face: negative  Respiratory: negative  Cardiovascular: negative  Behavioral/Psych: negative  Endocrine: negative          Objective   /64  Ht 63\" (160 cm)  Wt 188 lb (85.3 kg)  BMI 33.3 kg/m2    General:  well developed; well nourished  no acute distress   Skin:  No suspicious lesions seen   Thyroid: normal to inspection and palpation   Lungs:  breathing is unlabored   Heart:  Not performed.   Breasts:  Not performed.   Abdomen: soft, non-tender; no masses  no umbilical or inginual hernias are present  no hepato-splenomegaly   Pelvis: Not performed.     Psychiatric: Alert and oriented ×3, mood and affect appropriate  HEENT: Atraumatic, normocephalic, normal scleral icterus  Extremities: 2+ pulses bilaterally, no edema      Lab Review   No data reviewed    Imaging   Pelvic ultrasound images independantly reviewed - left multiple cysts versus one with spetations--smaller than before        Assessment   1. Left ovarian remnant     Plan   1. TVs demonstrates decrease in size from 5+ cm to < 2cm,   2. GYN ONC appt 11/2    No orders of the defined types were placed in this encounter.         This note was electronically signed.      October 11, 2017      "

## 2017-10-12 DIAGNOSIS — R11.2 NAUSEA AND VOMITING, INTRACTABILITY OF VOMITING NOT SPECIFIED, UNSPECIFIED VOMITING TYPE: ICD-10-CM

## 2017-10-12 RX ORDER — PROMETHAZINE HYDROCHLORIDE 12.5 MG/1
TABLET ORAL
Qty: 20 TABLET | Refills: 0 | Status: SHIPPED | OUTPATIENT
Start: 2017-10-12 | End: 2017-10-22 | Stop reason: SDUPTHER

## 2017-10-18 ENCOUNTER — OFFICE VISIT (OUTPATIENT)
Dept: SURGERY | Facility: CLINIC | Age: 37
End: 2017-10-18

## 2017-10-18 VITALS
TEMPERATURE: 97.5 F | SYSTOLIC BLOOD PRESSURE: 130 MMHG | HEART RATE: 65 BPM | BODY MASS INDEX: 34.2 KG/M2 | WEIGHT: 193 LBS | OXYGEN SATURATION: 97 % | DIASTOLIC BLOOD PRESSURE: 80 MMHG | HEIGHT: 63 IN

## 2017-10-18 DIAGNOSIS — K52.9 COLITIS: ICD-10-CM

## 2017-10-18 DIAGNOSIS — K82.8 BILIARY DYSKINESIA: ICD-10-CM

## 2017-10-18 DIAGNOSIS — K21.00 GASTROESOPHAGEAL REFLUX DISEASE WITH ESOPHAGITIS: Primary | ICD-10-CM

## 2017-10-18 DIAGNOSIS — R10.11 RIGHT UPPER QUADRANT ABDOMINAL PAIN: ICD-10-CM

## 2017-10-18 DIAGNOSIS — K81.9 CHOLECYSTITIS: ICD-10-CM

## 2017-10-18 PROCEDURE — 99214 OFFICE O/P EST MOD 30 MIN: CPT | Performed by: SURGERY

## 2017-10-18 RX ORDER — LANOLIN ALCOHOL/MO/W.PET/CERES
1000 CREAM (GRAM) TOPICAL DAILY
COMMUNITY

## 2017-10-18 NOTE — PROGRESS NOTES
Answers for HPI/ROS submitted by the patient on 10/16/2017   Abdominal pain  Chronicity: recurrent  Onset: more than 1 month ago  Onset quality: undetermined  Frequency: daily  Episode duration: 24 hours  Progression since onset: waxing and waning  Pain location: RUQ  Pain - numeric: 6/10  Pain quality: aching, cramping, sharp  Radiates to: does not radiate  anorexia: No  arthralgias: No  belching: No  constipation: No  diarrhea: No  dysuria: No  fever: No  flatus: No  frequency: No  headaches: Yes  hematochezia: No  hematuria: No  melena: No  myalgias: No  nausea: Yes  weight loss: No  vomiting: Yes  Aggravated by: nothing  Relieved by: nothing  Diagnostic workup: CT scan, lower endoscopy, surgery, ultrasound  Patient: Ange Wade    YOB: 1980    Date: 10/18/2017    Primary Care Provider: Chase Correa MD    Reason for Consultation:Epigastric pain, GERD    Chief complaint:   Chief Complaint   Patient presents with   • Abdominal Pain     RLQ pain   • Nausea   • Vomiting       Subjective .     History of present illness:  I saw the patient in the office  today as a consultation for evaluation and treatment of RLQ pain, nausea and vomiting since for six months.  The pain does not radiate and patient states that nothing relieves the pain.  She does take medication for vomiting and it has helped a little.  She states that the pain is constant and foods do not seem to be the cause. Patient has had a CT of the abdomen and pelvis that showed colitis in June.   She has also had a GB ultrasound that was negative along with a Hida Scan that showed a 34% ejection fraction and the injection did cause pain. She is also due for a colonoscopy Follow-up to evaluate colitis that was found 4 months ago.       Review of Systems   Constitutional: Negative for chills, fever and unexpected weight change.   HENT: Negative for hearing loss, trouble swallowing and voice change.    Eyes: Negative for visual  disturbance.   Respiratory: Negative for apnea, cough, chest tightness, shortness of breath and wheezing.    Cardiovascular: Negative for chest pain, palpitations and leg swelling.   Gastrointestinal: Positive for abdominal pain, nausea and vomiting. Negative for abdominal distention, anal bleeding, blood in stool, constipation, diarrhea and rectal pain.   Endocrine: Negative for cold intolerance and heat intolerance.   Genitourinary: Negative for difficulty urinating, dysuria, flank pain, frequency and hematuria.   Musculoskeletal: Negative for arthralgias, back pain, gait problem and myalgias.   Skin: Negative for color change, rash and wound.   Neurological: Positive for headaches. Negative for dizziness, syncope, speech difficulty, weakness, light-headedness and numbness.   Hematological: Negative for adenopathy. Does not bruise/bleed easily.   Psychiatric/Behavioral: Negative for confusion. The patient is not nervous/anxious.        History:  Past Medical History:   Diagnosis Date   • Abnormal ECG ?    [facility]   • Abnormal Pap smear of cervix     Precancerous cells   • Anemia    • Fibroid uterus    • History of blood clots     after c section    • History of CT scan    • History of MRI    • History of Papanicolaou smear of cervix 2015    NORMAL    • Migraine    • Ovarian cyst     Had uterus removed 2015   • Pelvic adhesive disease    • Phonophobia    • PMS (premenstrual syndrome)    • Preeclampsia 1-    During my 1st pregnacy   • Urinary tract infection     Only had during last pregnacy   • Varicella    • Vasovagal syncope        Past Surgical History:   Procedure Laterality Date   • APPENDECTOMY     • BLADDER REPAIR  2015    REPAIR OF INCIDENTAL CYSTOTOMY (DR ROGER CRAWFORD)   •  SECTION     •  SECTION     • COLONOSCOPY N/A 2017    Procedure: COLONOSCOPY WITH BIOPSY;  Surgeon: Jaylene Doherty MD;  Location: [facility]  ENDOSCOPY;  Service:    • DIAGNOSTIC LAPAROSCOPY  08/13/2015    DR ROGER CRAWFORD   • EXPLORATORY LAPAROTOMY N/A 4/24/2017    Procedure: LAPAROTOMY EXPLORATORY with bilateral salpingo-oophorectomy, extensive lysis of adhesions appendectomy, cystoscopy;  Surgeon: Roger Crawford MD;  Location: [facility];  Service:    • HYSTERECTOMY  08/13/2016    [facility] (DR ROGER CRAWFORD)   • MOUTH SURGERY  2007   • TOE SURGERY Right 1991    GREAT TOE   • TONSILLECTOMY  82 or 83   • TUBAL ABDOMINAL LIGATION  2002   • WISDOM TOOTH EXTRACTION  3-2003       Family History   Problem Relation Age of Onset   • Mental illness Other    • Cancer Other    • Diabetes Other    • Hypertension Other    • Heart disease Other    • Stroke Other    • Breast cancer Maternal Grandmother    • Ovarian cancer Maternal Grandmother    • Stroke Maternal Grandmother    • Diabetes Maternal Grandmother    • Breast cancer Maternal Aunt    • Stroke Paternal Grandmother    • Diabetes Paternal Grandmother        Social History   Substance Use Topics   • Smoking status: Former Smoker     Packs/day: 2.00     Years: 3.00     Types: Cigarettes     Quit date: 2/14/1996   • Smokeless tobacco: Never Used   • Alcohol use No       Allergies:  Allergies   Allergen Reactions   • Darvon [Propoxyphene] Hives     darvocet       Medications:    Current Outpatient Prescriptions:   •  amitriptyline (ELAVIL) 100 MG tablet, Take 2 tablets by mouth Every Night., Disp: 60 tablet, Rfl: 1  •  Omeprazole (EQ OMEPRAZOLE) 20 MG tablet delayed-release, Take 20 mg by mouth Daily., Disp: 30 each, Rfl: 11  •  ondansetron (ZOFRAN) 4 MG tablet, TAKE 1 TABLET BY MOUTH EVERY SIX HOURS AS NEEDED FOR NAUSEA AND VOMITING, Disp: 24 tablet, Rfl: 0  •  promethazine (PHENERGAN) 12.5 MG tablet, TAKE 2 TABLETS BY MOUTH EVERY SIX HOURS AS NEEDED FOR NAUSEA AND VOMITING., Disp: 20 tablet, Rfl: 0  •  SUMAtriptan (IMITREX) 100 MG tablet, take 1 tablet by mouth one time AS NEEDED at onset of MIGRAINE - for up to 1 dose, Disp:  15 tablet, Rfl: 1  •  TROKENDI XR 50 MG capsule sustained-release 24 hr, TAKE 1 CAPSULE BY MOUTH ONE TIME A DAY , Disp: 30 capsule, Rfl: 1    Current Facility-Administered Medications:   •  bupivacaine PF (MARCAINE) 0.75 % injection 37.5 mg, 5 mL, Injection, Once, Esa Miller MD, FAAN  •  cyanocobalamin injection 1,000 mcg, 1,000 mcg, Intramuscular, Q28 Days, Chase Correa MD, 1,000 mcg at 08/11/17 0945    Objective     Vital Signs:   Temp:  [97.5 °F (36.4 °C)] 97.5 °F (36.4 °C)  Heart Rate:  [65] 65  BP: (130)/(80) 130/80    Physical Exam:   General Appearance:    Alert, cooperative, in no acute distress   Head:    Normocephalic, without obvious abnormality, atraumatic   Eyes:            Lids and lashes normal, conjunctivae and sclerae normal, no   icterus, no pallor, corneas clear, PERRLA   Ears:    Ears appear intact with no abnormalities noted   Throat:   No oral lesions, no thrush, oral mucosa moist   Neck:   No adenopathy, supple, trachea midline, no thyromegaly, no   carotid bruit, no JVD   Lungs:     Clear to auscultation,respirations regular, even and                  unlabored    Heart:    Regular rhythm and normal rate, normal S1 and S2, no            murmur, no gallop, no rub, no click   Chest Wall:    No abnormalities observed   Abdomen:     Normal bowel sounds, no masses, no organomegaly, soft        non-tender, non-distended, no guarding, there is evidence of epigastric  tenderness   Extremities:   Moves all extremities well, no edema, no cyanosis, no             redness   Pulses:   Pulses palpable and equal bilaterally   Skin:   No bleeding, bruising or rash   Lymph nodes:   No palpable adenopathy   Neurologic:   Cranial nerves 2 - 12 grossly intact, sensation intact     Results Review:   I reviewed the patient's new clinical results.    Review of Systems was reviewed and confirmed as accurate today.    Assessment/Plan     1. Gastroesophageal reflux disease with esophagitis    2. Right upper  quadrant abdominal pain    3. Cholecystitis    4. Biliary dyskinesia    5. Colitis        I did have a detailed and extensive discussion with the patient in the office and they understand that they need to undergo upper endoscopy And colonoscopy. Full risks and benefits of operative versus nonoperative intervention were discussed with the patient and these include bleeding and esophageal injury. The patient understands, agrees, and wishes to proceed with the surgical treatment plan as mentioned above. The patient had no questions for me at the end of the discussion.       I discussed the patients findings and my recommendations with patient.  If upper and lower endoscopy are negative, patient will need a laparoscopic cholecystectomy due to abnormal HIDA scan.    Electronically signed by Jaylene Doherty MD  10/18/17 9:11 AM        Scribed for Jaylene Doherty MD by Delia Love. 10/18/2017  9:55 AM

## 2017-10-19 ENCOUNTER — ANESTHESIA EVENT (OUTPATIENT)
Dept: GASTROENTEROLOGY | Facility: HOSPITAL | Age: 37
End: 2017-10-19

## 2017-10-19 ENCOUNTER — TELEPHONE (OUTPATIENT)
Dept: SURGERY | Facility: CLINIC | Age: 37
End: 2017-10-19

## 2017-10-20 ENCOUNTER — HOSPITAL ENCOUNTER (OUTPATIENT)
Facility: HOSPITAL | Age: 37
Setting detail: HOSPITAL OUTPATIENT SURGERY
Discharge: HOME | End: 2017-10-20
Attending: SURGERY | Admitting: SURGERY

## 2017-10-20 ENCOUNTER — ANESTHESIA (OUTPATIENT)
Dept: GASTROENTEROLOGY | Facility: HOSPITAL | Age: 37
End: 2017-10-20

## 2017-10-20 VITALS
HEART RATE: 68 BPM | SYSTOLIC BLOOD PRESSURE: 100 MMHG | OXYGEN SATURATION: 100 % | TEMPERATURE: 97.9 F | DIASTOLIC BLOOD PRESSURE: 75 MMHG | BODY MASS INDEX: 34.2 KG/M2 | WEIGHT: 193 LBS | RESPIRATION RATE: 18 BRPM | HEIGHT: 63 IN

## 2017-10-20 DIAGNOSIS — K81.9 CHOLECYSTITIS: ICD-10-CM

## 2017-10-20 DIAGNOSIS — K21.00 GASTROESOPHAGEAL REFLUX DISEASE WITH ESOPHAGITIS: ICD-10-CM

## 2017-10-20 DIAGNOSIS — K82.8 BILIARY DYSKINESIA: ICD-10-CM

## 2017-10-20 DIAGNOSIS — R10.11 RIGHT UPPER QUADRANT ABDOMINAL PAIN: ICD-10-CM

## 2017-10-20 DIAGNOSIS — K52.9 COLITIS: ICD-10-CM

## 2017-10-20 PROCEDURE — 25010000002 MIDAZOLAM PER 1 MG: Performed by: NURSE ANESTHETIST, CERTIFIED REGISTERED

## 2017-10-20 PROCEDURE — 25010000002 PROPOFOL 200 MG/20ML EMULSION: Performed by: NURSE ANESTHETIST, CERTIFIED REGISTERED

## 2017-10-20 RX ORDER — PROPOFOL 10 MG/ML
INJECTION, EMULSION INTRAVENOUS AS NEEDED
Status: DISCONTINUED | OUTPATIENT
Start: 2017-10-20 | End: 2017-10-20 | Stop reason: SURG

## 2017-10-20 RX ORDER — MIDAZOLAM HYDROCHLORIDE 1 MG/ML
INJECTION INTRAMUSCULAR; INTRAVENOUS AS NEEDED
Status: DISCONTINUED | OUTPATIENT
Start: 2017-10-20 | End: 2017-10-20 | Stop reason: SURG

## 2017-10-20 RX ORDER — SODIUM CHLORIDE, SODIUM LACTATE, POTASSIUM CHLORIDE, CALCIUM CHLORIDE 600; 310; 30; 20 MG/100ML; MG/100ML; MG/100ML; MG/100ML
1000 INJECTION, SOLUTION INTRAVENOUS CONTINUOUS PRN
Status: DISCONTINUED | OUTPATIENT
Start: 2017-10-20 | End: 2017-10-20 | Stop reason: HOSPADM

## 2017-10-20 RX ORDER — SODIUM CHLORIDE 0.9 % (FLUSH) 0.9 %
3 SYRINGE (ML) INJECTION AS NEEDED
Status: DISCONTINUED | OUTPATIENT
Start: 2017-10-20 | End: 2017-10-20 | Stop reason: HOSPADM

## 2017-10-20 RX ORDER — ONDANSETRON 2 MG/ML
4 INJECTION INTRAMUSCULAR; INTRAVENOUS ONCE AS NEEDED
Status: DISCONTINUED | OUTPATIENT
Start: 2017-10-20 | End: 2017-10-20 | Stop reason: HOSPADM

## 2017-10-20 RX ORDER — MEPERIDINE HYDROCHLORIDE 50 MG/ML
INJECTION INTRAMUSCULAR; INTRAVENOUS; SUBCUTANEOUS AS NEEDED
Status: DISCONTINUED | OUTPATIENT
Start: 2017-10-20 | End: 2017-10-20 | Stop reason: SURG

## 2017-10-20 RX ADMIN — MEPERIDINE HYDROCHLORIDE 25 MG: 50 INJECTION INTRAMUSCULAR; INTRAVENOUS; SUBCUTANEOUS at 09:28

## 2017-10-20 RX ADMIN — MIDAZOLAM HYDROCHLORIDE 2 MG: 1 INJECTION, SOLUTION INTRAMUSCULAR; INTRAVENOUS at 09:31

## 2017-10-20 RX ADMIN — PROPOFOL 100 MG: 10 INJECTION, EMULSION INTRAVENOUS at 09:32

## 2017-10-20 RX ADMIN — PROPOFOL 50 MG: 10 INJECTION, EMULSION INTRAVENOUS at 09:28

## 2017-10-20 RX ADMIN — PROPOFOL 50 MG: 10 INJECTION, EMULSION INTRAVENOUS at 09:40

## 2017-10-20 RX ADMIN — MEPERIDINE HYDROCHLORIDE 25 MG: 50 INJECTION INTRAMUSCULAR; INTRAVENOUS; SUBCUTANEOUS at 09:33

## 2017-10-20 RX ADMIN — SODIUM CHLORIDE, POTASSIUM CHLORIDE, SODIUM LACTATE AND CALCIUM CHLORIDE 1000 ML: 600; 310; 30; 20 INJECTION, SOLUTION INTRAVENOUS at 09:21

## 2017-10-20 NOTE — DISCHARGE INSTRUCTIONS
Please follow all post op instructions and follow up appointment time from your physician's office included in your discharge packet.  .   Rest today  No pushing,pulling,tugging,heavy lifting, or strenuous activity   No major decision making,driving,or drinking alcoholic beverages for 24 hours due to the sedation you received  Always use good hand hygiene/washing technique  No driving on pain medication.    To assist you in voiding:  Drink plenty of fluids  Listen to running water while attempting to void.    If you are unable to urinate and you have an uncomfortable urge to void or it has been   6 hours since you were discharged, return to the Emergency Room

## 2017-10-20 NOTE — PLAN OF CARE
Problem: GI Endoscopy (Adult)  Goal: Signs and Symptoms of Listed Potential Problems Will be Absent or Manageable (GI Endoscopy)  Outcome: Ongoing (interventions implemented as appropriate)    10/20/17 6711   GI Endoscopy   Problems Assessed (GI Endoscopy) all   Problems Present (GI Endoscopy) none

## 2017-10-20 NOTE — ANESTHESIA POSTPROCEDURE EVALUATION
Patient: Ange Wade    Procedure Summary     Date Anesthesia Start Anesthesia Stop Room / Location    10/20/17 0927 0946 [facility] ENDOSCOPY 3 / [facility] ENDOSCOPY       Procedure Diagnosis Surgeon Provider    ESOPHAGOGASTRODUODENOSCOPY WITH BIOPSY (N/A Esophagus); COLONOSCOPY (N/A ) Biliary dyskinesia; Cholecystitis; Colitis; Right upper quadrant abdominal pain; Gastroesophageal reflux disease with esophagitis  (Biliary dyskinesia [K82.8]; Cholecystitis [K81.9]; Colitis [K52.9]; Right upper quadrant abdominal pain [R10.11]; Gastroesophageal reflux disease with esophagitis [K21.0]) MD Cole Boateng CRNA          Anesthesia Type: MAC  Last vitals  BP   110/80   Temp 98   Pulse 78   Resp 14   SpO2 99     Post Anesthesia Care and Evaluation    Patient location during evaluation: bedside  Patient participation: complete - patient participated  Level of consciousness: awake and awake and alert  Pain score: 0  Pain management: satisfactory to patient  Airway patency: patent  Anesthetic complications: No anesthetic complications  PONV Status: none  Cardiovascular status: acceptable and stable  Respiratory status: acceptable, spontaneous ventilation, room air and nonlabored ventilation  Hydration status: acceptable

## 2017-10-20 NOTE — H&P (VIEW-ONLY)
Answers for HPI/ROS submitted by the patient on 10/16/2017   Abdominal pain  Chronicity: recurrent  Onset: more than 1 month ago  Onset quality: undetermined  Frequency: daily  Episode duration: 24 hours  Progression since onset: waxing and waning  Pain location: RUQ  Pain - numeric: 6/10  Pain quality: aching, cramping, sharp  Radiates to: does not radiate  anorexia: No  arthralgias: No  belching: No  constipation: No  diarrhea: No  dysuria: No  fever: No  flatus: No  frequency: No  headaches: Yes  hematochezia: No  hematuria: No  melena: No  myalgias: No  nausea: Yes  weight loss: No  vomiting: Yes  Aggravated by: nothing  Relieved by: nothing  Diagnostic workup: CT scan, lower endoscopy, surgery, ultrasound  Patient: Ange Wade    YOB: 1980    Date: 10/18/2017    Primary Care Provider: Chase Correa MD    Reason for Consultation:Epigastric pain, GERD    Chief complaint:   Chief Complaint   Patient presents with   • Abdominal Pain     RLQ pain   • Nausea   • Vomiting       Subjective .     History of present illness:  I saw the patient in the office  today as a consultation for evaluation and treatment of RLQ pain, nausea and vomiting since for six months.  The pain does not radiate and patient states that nothing relieves the pain.  She does take medication for vomiting and it has helped a little.  She states that the pain is constant and foods do not seem to be the cause. Patient has had a CT of the abdomen and pelvis that showed colitis in June.   She has also had a GB ultrasound that was negative along with a Hida Scan that showed a 34% ejection fraction and the injection did cause pain. She is also due for a colonoscopy Follow-up to evaluate colitis that was found 4 months ago.       Review of Systems   Constitutional: Negative for chills, fever and unexpected weight change.   HENT: Negative for hearing loss, trouble swallowing and voice change.    Eyes: Negative for visual  disturbance.   Respiratory: Negative for apnea, cough, chest tightness, shortness of breath and wheezing.    Cardiovascular: Negative for chest pain, palpitations and leg swelling.   Gastrointestinal: Positive for abdominal pain, nausea and vomiting. Negative for abdominal distention, anal bleeding, blood in stool, constipation, diarrhea and rectal pain.   Endocrine: Negative for cold intolerance and heat intolerance.   Genitourinary: Negative for difficulty urinating, dysuria, flank pain, frequency and hematuria.   Musculoskeletal: Negative for arthralgias, back pain, gait problem and myalgias.   Skin: Negative for color change, rash and wound.   Neurological: Positive for headaches. Negative for dizziness, syncope, speech difficulty, weakness, light-headedness and numbness.   Hematological: Negative for adenopathy. Does not bruise/bleed easily.   Psychiatric/Behavioral: Negative for confusion. The patient is not nervous/anxious.        History:  Past Medical History:   Diagnosis Date   • Abnormal ECG ?    [facility]   • Abnormal Pap smear of cervix     Precancerous cells   • Anemia    • Fibroid uterus    • History of blood clots     after c section    • History of CT scan    • History of MRI    • History of Papanicolaou smear of cervix 2015    NORMAL    • Migraine    • Ovarian cyst     Had uterus removed 2015   • Pelvic adhesive disease    • Phonophobia    • PMS (premenstrual syndrome)    • Preeclampsia 1-    During my 1st pregnacy   • Urinary tract infection     Only had during last pregnacy   • Varicella    • Vasovagal syncope        Past Surgical History:   Procedure Laterality Date   • APPENDECTOMY     • BLADDER REPAIR  2015    REPAIR OF INCIDENTAL CYSTOTOMY (DR ROGER CRAWFORD)   •  SECTION     •  SECTION     • COLONOSCOPY N/A 2017    Procedure: COLONOSCOPY WITH BIOPSY;  Surgeon: Jaylene Doherty MD;  Location: [facility]  ENDOSCOPY;  Service:    • DIAGNOSTIC LAPAROSCOPY  08/13/2015    DR ROGER CRAWFORD   • EXPLORATORY LAPAROTOMY N/A 4/24/2017    Procedure: LAPAROTOMY EXPLORATORY with bilateral salpingo-oophorectomy, extensive lysis of adhesions appendectomy, cystoscopy;  Surgeon: Roger Crawford MD;  Location: [facility];  Service:    • HYSTERECTOMY  08/13/2016    [facility] (DR ROGER CRAWFORD)   • MOUTH SURGERY  2007   • TOE SURGERY Right 1991    GREAT TOE   • TONSILLECTOMY  82 or 83   • TUBAL ABDOMINAL LIGATION  2002   • WISDOM TOOTH EXTRACTION  3-2003       Family History   Problem Relation Age of Onset   • Mental illness Other    • Cancer Other    • Diabetes Other    • Hypertension Other    • Heart disease Other    • Stroke Other    • Breast cancer Maternal Grandmother    • Ovarian cancer Maternal Grandmother    • Stroke Maternal Grandmother    • Diabetes Maternal Grandmother    • Breast cancer Maternal Aunt    • Stroke Paternal Grandmother    • Diabetes Paternal Grandmother        Social History   Substance Use Topics   • Smoking status: Former Smoker     Packs/day: 2.00     Years: 3.00     Types: Cigarettes     Quit date: 2/14/1996   • Smokeless tobacco: Never Used   • Alcohol use No       Allergies:  Allergies   Allergen Reactions   • Darvon [Propoxyphene] Hives     darvocet       Medications:    Current Outpatient Prescriptions:   •  amitriptyline (ELAVIL) 100 MG tablet, Take 2 tablets by mouth Every Night., Disp: 60 tablet, Rfl: 1  •  Omeprazole (EQ OMEPRAZOLE) 20 MG tablet delayed-release, Take 20 mg by mouth Daily., Disp: 30 each, Rfl: 11  •  ondansetron (ZOFRAN) 4 MG tablet, TAKE 1 TABLET BY MOUTH EVERY SIX HOURS AS NEEDED FOR NAUSEA AND VOMITING, Disp: 24 tablet, Rfl: 0  •  promethazine (PHENERGAN) 12.5 MG tablet, TAKE 2 TABLETS BY MOUTH EVERY SIX HOURS AS NEEDED FOR NAUSEA AND VOMITING., Disp: 20 tablet, Rfl: 0  •  SUMAtriptan (IMITREX) 100 MG tablet, take 1 tablet by mouth one time AS NEEDED at onset of MIGRAINE - for up to 1 dose, Disp:  15 tablet, Rfl: 1  •  TROKENDI XR 50 MG capsule sustained-release 24 hr, TAKE 1 CAPSULE BY MOUTH ONE TIME A DAY , Disp: 30 capsule, Rfl: 1    Current Facility-Administered Medications:   •  bupivacaine PF (MARCAINE) 0.75 % injection 37.5 mg, 5 mL, Injection, Once, Esa Miller MD, FAAN  •  cyanocobalamin injection 1,000 mcg, 1,000 mcg, Intramuscular, Q28 Days, Chase Correa MD, 1,000 mcg at 08/11/17 0945    Objective     Vital Signs:   Temp:  [97.5 °F (36.4 °C)] 97.5 °F (36.4 °C)  Heart Rate:  [65] 65  BP: (130)/(80) 130/80    Physical Exam:   General Appearance:    Alert, cooperative, in no acute distress   Head:    Normocephalic, without obvious abnormality, atraumatic   Eyes:            Lids and lashes normal, conjunctivae and sclerae normal, no   icterus, no pallor, corneas clear, PERRLA   Ears:    Ears appear intact with no abnormalities noted   Throat:   No oral lesions, no thrush, oral mucosa moist   Neck:   No adenopathy, supple, trachea midline, no thyromegaly, no   carotid bruit, no JVD   Lungs:     Clear to auscultation,respirations regular, even and                  unlabored    Heart:    Regular rhythm and normal rate, normal S1 and S2, no            murmur, no gallop, no rub, no click   Chest Wall:    No abnormalities observed   Abdomen:     Normal bowel sounds, no masses, no organomegaly, soft        non-tender, non-distended, no guarding, there is evidence of epigastric  tenderness   Extremities:   Moves all extremities well, no edema, no cyanosis, no             redness   Pulses:   Pulses palpable and equal bilaterally   Skin:   No bleeding, bruising or rash   Lymph nodes:   No palpable adenopathy   Neurologic:   Cranial nerves 2 - 12 grossly intact, sensation intact     Results Review:   I reviewed the patient's new clinical results.    Review of Systems was reviewed and confirmed as accurate today.    Assessment/Plan     1. Gastroesophageal reflux disease with esophagitis    2. Right upper  quadrant abdominal pain    3. Cholecystitis    4. Biliary dyskinesia    5. Colitis        I did have a detailed and extensive discussion with the patient in the office and they understand that they need to undergo upper endoscopy And colonoscopy. Full risks and benefits of operative versus nonoperative intervention were discussed with the patient and these include bleeding and esophageal injury. The patient understands, agrees, and wishes to proceed with the surgical treatment plan as mentioned above. The patient had no questions for me at the end of the discussion.       I discussed the patients findings and my recommendations with patient.  If upper and lower endoscopy are negative, patient will need a laparoscopic cholecystectomy due to abnormal HIDA scan.    Electronically signed by Jaylene Doherty MD  10/18/17 9:11 AM        Scribed for Jaylene Doherty MD by Delia Love. 10/18/2017  9:55 AM

## 2017-10-20 NOTE — ANESTHESIA PREPROCEDURE EVALUATION
Anesthesia Evaluation     Patient summary reviewed and Nursing notes reviewed   no history of anesthetic complications:  NPO Solid Status: > 8 hours  NPO Liquid Status: > 8 hours     Airway   Mallampati: I  TM distance: >3 FB  Neck ROM: full  Dental    (+) edentulous and upper dentures    Pulmonary - normal exam   (+) a smoker (Quit 1996) Former,   Cardiovascular - normal exam    (-) hypertension      Neuro/Psych  (+) headaches (Migraines), syncope, numbness (chronic left sided transient weakness that results in patient fall), psychiatric history (Clostrophobia) Anxiety and Depression,    GI/Hepatic/Renal/Endo    (+) obesity (BMI 31),  GERD,     Musculoskeletal     (+) back pain,   Abdominal  - normal exam   Substance History      OB/GYN    (-) Preeclampsia and history of pregnancy induced hypertension        Other        ROS/Med Hx Other: +Insomnia      Phys Exam Other: Top dentures                              Anesthesia Plan    ASA 3     MAC   (Pt told that intravenous sedation will be used as the primary anesthetic. Every effort will be made to make sure the patient is comfortable.     The patient was told they may or may not have recall for the procedure.  Will proceed with the plan of care.)  intravenous induction   Anesthetic plan and risks discussed with patient.

## 2017-10-22 DIAGNOSIS — R11.2 NAUSEA AND VOMITING, INTRACTABILITY OF VOMITING NOT SPECIFIED, UNSPECIFIED VOMITING TYPE: ICD-10-CM

## 2017-10-23 ENCOUNTER — OFFICE VISIT (OUTPATIENT)
Dept: SURGERY | Facility: CLINIC | Age: 37
End: 2017-10-23

## 2017-10-23 VITALS
HEART RATE: 70 BPM | DIASTOLIC BLOOD PRESSURE: 80 MMHG | OXYGEN SATURATION: 98 % | SYSTOLIC BLOOD PRESSURE: 120 MMHG | WEIGHT: 192 LBS | TEMPERATURE: 98.2 F | HEIGHT: 63 IN | BODY MASS INDEX: 34.02 KG/M2

## 2017-10-23 DIAGNOSIS — K81.9 CHOLECYSTITIS: Primary | ICD-10-CM

## 2017-10-23 DIAGNOSIS — K82.8 BILIARY DYSKINESIA: ICD-10-CM

## 2017-10-23 PROCEDURE — 99213 OFFICE O/P EST LOW 20 MIN: CPT | Performed by: SURGERY

## 2017-10-23 RX ORDER — PROMETHAZINE HYDROCHLORIDE 12.5 MG/1
TABLET ORAL
Qty: 20 TABLET | Refills: 0 | Status: SHIPPED | OUTPATIENT
Start: 2017-10-23 | End: 2017-10-27 | Stop reason: SDUPTHER

## 2017-10-23 NOTE — PROGRESS NOTES
Patient: Ange Wade    YOB: 1980    Date: 10/23/2017    Primary Care Provider: Chase Correa MD    Reason for Consultation: Follow-up EGD    Chief Complaint:   Chief Complaint   Patient presents with   • Follow-up     Patient is here for a follow up egd and colonoscopy       History of present illness:  I saw the patient in the office today as a followup from their recent EGD with biopsy and colonoscopy, the pathology report did show mild chronic gastritis, benign oxyntic gastric mucosa with large prominent lymphoid aggregate and overlying mild chronic gastritis.  Patient states that she is still having pain on the right side, patient states that she is still nausea and vomiting.  Patient states that she is vomiting water at times.  Patient states that her appetite is fair. Patient states that she is having normal bowel movements.Patient had abnormal HIDA scan in the past with poor ejection fraction and reproduction of pain with Kinevac.  She would like to schedule a laparoscopic cholecystectomy at some point.    Review of Systems   Constitutional: Negative for chills, fatigue and fever.   HENT: Negative for trouble swallowing.    Respiratory: Negative for cough and shortness of breath.    Cardiovascular: Negative for chest pain.   Gastrointestinal: Positive for abdominal pain, nausea and vomiting. Negative for diarrhea.       Vital Signs:   Temp:  [98.2 °F (36.8 °C)] 98.2 °F (36.8 °C)  Heart Rate:  [70] 70  BP: (120)/(80) 120/80    Allergies:  Allergies   Allergen Reactions   • Darvon [Propoxyphene] Hives     darvocet       Medications:    Current Outpatient Prescriptions:   •  amitriptyline (ELAVIL) 100 MG tablet, Take 2 tablets by mouth Every Night., Disp: 60 tablet, Rfl: 1  •  Omeprazole (EQ OMEPRAZOLE) 20 MG tablet delayed-release, Take 20 mg by mouth Daily., Disp: 30 each, Rfl: 11  •  ondansetron (ZOFRAN) 4 MG tablet, TAKE 1 TABLET BY MOUTH EVERY SIX HOURS AS NEEDED FOR NAUSEA AND  VOMITING, Disp: 24 tablet, Rfl: 0  •  promethazine (PHENERGAN) 12.5 MG tablet, TAKE 2 TABLETS BY MOUTH EVERY SIX HOURS AS NEEDED FOR NAUSEA AND VOMITING., Disp: 20 tablet, Rfl: 0  •  SUMAtriptan (IMITREX) 100 MG tablet, take 1 tablet by mouth one time AS NEEDED at onset of MIGRAINE - for up to 1 dose, Disp: 15 tablet, Rfl: 1  •  TROKENDI XR 50 MG capsule sustained-release 24 hr, TAKE 1 CAPSULE BY MOUTH ONE TIME A DAY , Disp: 30 capsule, Rfl: 1  •  vitamin B-12 (CYANOCOBALAMIN) 1000 MCG tablet, Take 1,000 mcg by mouth Daily. (PATIENT USURE MCG STRENGTH), Disp: , Rfl:     Physical Exam:   General Appearance:    Alert, cooperative, in no acute distress   Abdomen:     no masses, no organomegaly, soft non-tender, non-distended, no guarding, wounds are well healed, no evidence of recurrent hernia   Chest:      Clear toausculation            Cor:  Regular rate and rhythm      Assessment / Plan:    1. Cholecystitis    2. Biliary dyskinesia        I did discuss the situation with the patient today in the office and they have done well from their recent EGD with biopsy. I have told the patient To call which is raised schedule procedure, patient would like to do procedure at [facility] [place].    Electronically signed by Jaylene Doherty MD  10/23/17  Scribed for Jaylene Doherty MD by Afshan Denney. 10/23/2017  2:31 PM

## 2017-10-24 ENCOUNTER — TELEPHONE (OUTPATIENT)
Dept: FAMILY MEDICINE CLINIC | Facility: CLINIC | Age: 37
End: 2017-10-24

## 2017-10-24 NOTE — TELEPHONE ENCOUNTER
----- Message from Ange Wade sent at 10/24/2017  2:52 PM EDT -----  Regarding: RE: Test Results Question  Contact: [phone]  I'm just really confused with what Dr Doherty told me yesterday. When you done the hydrascan and said my gallbladder was fine. He said everything in my stomach and colon was fine and no problems but he is wanting to remove my gallbladder. I'm putting that off til I talk to you and the GYN specialist because I don't see why it needs to be removed  ----- Message -----  From: Chase Correa MD  Sent: 10/24/2017  2:33 PM EDT  To: Ange Wade  Subject: RE: Test Results Question    I'm off today can you ask your question? I will try to get back with you before your apt.          ----- Message -----     From: Ange Wade     Sent: 10/23/2017  8:22 PM EDT       To: Chase Correa MD  Subject: Test Results Question    I have a couple of  questions if you could call me this week and talk with me

## 2017-10-25 NOTE — TELEPHONE ENCOUNTER
I think you would be ok to wait till you GYN apt and get all your options.  Some times we remove the GB though normal test findings and some time pt get better. You have enough other things going on that if you chose to wait would be reasonable. Just Let Dr Doherty know you want to wait till after you see the gyn and get  Her opinion.

## 2017-10-26 LAB
LAB AP CASE REPORT: NORMAL
Lab: NORMAL
PATH REPORT.FINAL DX SPEC: NORMAL

## 2017-10-27 DIAGNOSIS — R11.2 NAUSEA AND VOMITING, INTRACTABILITY OF VOMITING NOT SPECIFIED, UNSPECIFIED VOMITING TYPE: ICD-10-CM

## 2017-10-27 RX ORDER — PROMETHAZINE HYDROCHLORIDE 12.5 MG/1
TABLET ORAL
Qty: 20 TABLET | Refills: 0 | Status: SHIPPED | OUTPATIENT
Start: 2017-10-27 | End: 2017-11-02 | Stop reason: SDUPTHER

## 2017-11-02 ENCOUNTER — OFFICE VISIT (OUTPATIENT)
Dept: GYNECOLOGIC ONCOLOGY | Facility: CLINIC | Age: 37
End: 2017-11-02

## 2017-11-02 VITALS
DIASTOLIC BLOOD PRESSURE: 84 MMHG | WEIGHT: 194 LBS | TEMPERATURE: 98.9 F | BODY MASS INDEX: 33.12 KG/M2 | HEART RATE: 80 BPM | HEIGHT: 64 IN | OXYGEN SATURATION: 98 % | RESPIRATION RATE: 14 BRPM | SYSTOLIC BLOOD PRESSURE: 122 MMHG

## 2017-11-02 DIAGNOSIS — R11.2 NAUSEA AND VOMITING, INTRACTABILITY OF VOMITING NOT SPECIFIED, UNSPECIFIED VOMITING TYPE: ICD-10-CM

## 2017-11-02 DIAGNOSIS — R11.0 NAUSEA: ICD-10-CM

## 2017-11-02 DIAGNOSIS — K82.8 BILIARY DYSKINESIA: ICD-10-CM

## 2017-11-02 DIAGNOSIS — K21.00 GASTROESOPHAGEAL REFLUX DISEASE WITH ESOPHAGITIS: ICD-10-CM

## 2017-11-02 DIAGNOSIS — R10.2 PELVIC PAIN: Primary | ICD-10-CM

## 2017-11-02 DIAGNOSIS — N99.83 OVARIAN REMNANT SYNDROME: ICD-10-CM

## 2017-11-02 DIAGNOSIS — K52.9 COLITIS: Primary | ICD-10-CM

## 2017-11-02 DIAGNOSIS — N83.201 RIGHT OVARIAN CYST: ICD-10-CM

## 2017-11-02 PROCEDURE — 99244 OFF/OP CNSLTJ NEW/EST MOD 40: CPT | Performed by: OBSTETRICS & GYNECOLOGY

## 2017-11-02 RX ORDER — PROMETHAZINE HYDROCHLORIDE 12.5 MG/1
TABLET ORAL
Qty: 20 TABLET | Refills: 0 | Status: SHIPPED | OUTPATIENT
Start: 2017-11-02 | End: 2017-12-22 | Stop reason: SDUPTHER

## 2017-11-02 RX ORDER — METOCLOPRAMIDE 10 MG/1
10 TABLET ORAL
Qty: 30 TABLET | Refills: 0 | Status: SHIPPED | OUTPATIENT
Start: 2017-11-02 | End: 2018-01-05

## 2017-11-03 NOTE — PROGRESS NOTES
Ange Wade  [id number]  1980      Reason for visit:  Abdominal pain, Nausea/Vomiting    Consultation:  Patient is being seen at the request of Dr. Lantigua for Ovarian Remnant    History of present illness:  The patient is a 37 y.o. year old female who presents today for a left ovarian remnant and concern that it is causing her abdominal pain and nausea/vomiting.  In the April of this year she underwent a exploratory laparotomy with BSO and appendectomy secondary to ovarian cysts and pain.  IT was done with an open technique due to previous abdominal surgeries and hysterectomy.  Following this surgery the patient notes an extended stay in the hospital due to uncontrolled pain, nausea, and vomiting.  These symptoms have continued to persist on a daily basis since that time despite multiple workups for likely causes.  It is noted that on occasion she can keep food down but other times she vomits all day.  She has not seen a GI specialisty.  During the workup of her pain a left ovarian remnant with cyst had been noted.  This has bee followed with repeat US and was noted to decrease in size to 2cm but she has had no improvement in her symptoms.  She is denying any constipation or diarrhea,  chest pain, shortness of air, abnormal bleeding, weakness, fatigue, weight loss/gain.        OBGYN History:  She is a .  She dos not use HRT. She denies a history of abnormal pap smears.      Oncologic History:   No history exists.         Past Medical History:   Diagnosis Date   • Abnormal ECG ?    [facility]   • Abnormal Pap smear of cervix 1998    Precancerous cells   • Anemia    • Body piercing     EARS   • DVT (deep vein thrombosis) in pregnancy     REPORTS 15 YEAR AGO   • Fibroid uterus    • History of blood clots     after c section    • History of CT scan    • History of MRI    • History of Papanicolaou smear of cervix 2015    NORMAL    • Migraine    • Ovarian cyst     Had  uterus removed 2015   • Pelvic adhesive disease    • Phonophobia    • PMS (premenstrual syndrome)    • Preeclampsia 1-    During my 1st pregnacy   • Urinary tract infection     Only had during last pregnacy   • Varicella    • Vasovagal syncope    • Wears dentures     FULL UPPER PLATE, INSTRUCTED NO ADHESIVES DOS       Past Surgical History:   Procedure Laterality Date   • APPENDECTOMY     • BLADDER REPAIR  2015    REPAIR OF INCIDENTAL CYSTOTOMY (DR ROGER CRAWFORD)   •  SECTION     •  SECTION     • COLONOSCOPY N/A 2017    Procedure: COLONOSCOPY WITH BIOPSY;  Surgeon: Jaylene Doherty MD;  Location: [facility] ENDOSCOPY;  Service:    • COLONOSCOPY N/A 10/20/2017    Procedure: COLONOSCOPY;  Surgeon: Jaylene Doherty MD;  Location: [facility] ENDOSCOPY;  Service:    • DIAGNOSTIC LAPAROSCOPY  2015    DR ROGER CRAWFORD   • ENDOSCOPY N/A 10/20/2017    Procedure: ESOPHAGOGASTRODUODENOSCOPY WITH BIOPSY;  Surgeon: Jaylene Doherty MD;  Location: [facility] ENDOSCOPY;  Service:    • EXPLORATORY LAPAROTOMY N/A 2017    Procedure: LAPAROTOMY EXPLORATORY with bilateral salpingo-oophorectomy, extensive lysis of adhesions appendectomy, cystoscopy;  Surgeon: Roger Crawford MD;  Location: [facility] OR;  Service:    • HYSTERECTOMY  2016    LUPE (DR ROGER CRAWFORD), REPORTS LEFT OVARY INTACT   • MOUTH SURGERY     • TOE SURGERY Right 1991    GREAT TOE   • TONSILLECTOMY  82 or 83   • TUBAL ABDOMINAL LIGATION     • WISDOM TOOTH EXTRACTION  3-2003       MEDICATIONS: The current medication list was reviewed with the patient and updated in the EMR this date per the Medical Assistant. Medication dosages and frequencies were confirmed to be accurate.      Allergies:  is allergic to darvon [propoxyphene].    Social History:   Social History     Social History   • Marital status:      Spouse name: N/A   • Number of children: N/A   • Years of education: N/A     Occupational History   • Not on  file.     Social History Main Topics   • Smoking status: Former Smoker     Packs/day: 2.00     Years: 3.00     Types: Cigarettes     Quit date: 2/14/1996   • Smokeless tobacco: Never Used   • Alcohol use No   • Drug use: No   • Sexual activity: Defer     Other Topics Concern   • Not on file     Social History Narrative       Family History:    Family History   Problem Relation Age of Onset   • Mental illness Other    • Cancer Other    • Diabetes Other    • Hypertension Other    • Heart disease Other    • Stroke Other    • Breast cancer Maternal Grandmother    • Ovarian cancer Maternal Grandmother    • Stroke Maternal Grandmother    • Diabetes Maternal Grandmother    • Breast cancer Maternal Aunt    • Stroke Paternal Grandmother    • Diabetes Paternal Grandmother        Health Maintenance:    Health Maintenance   Topic Date Due   • TDAP/TD VACCINES (1 - Tdap) 07/02/1999   • PAP SMEAR  03/01/2017   • INFLUENZA VACCINE  08/01/2017         Review of Systems   Constitutional: Positive for appetite change (Per HPI). Negative for fatigue, fever and unexpected weight change.   HENT: Negative for congestion, hearing loss, sinus pain, sinus pressure and trouble swallowing.    Eyes: Negative for photophobia and pain.   Respiratory: Negative for cough, choking and shortness of breath.    Cardiovascular: Negative for chest pain, palpitations and leg swelling.   Gastrointestinal: Positive for abdominal pain (Per HPI), nausea (Per HPI) and vomiting (Per HPI). Negative for constipation and diarrhea.   Endocrine: Negative for polydipsia and polyuria.   Genitourinary: Positive for dyspareunia, pelvic pain and vaginal pain. Negative for difficulty urinating, dysuria, frequency, hematuria, vaginal bleeding and vaginal discharge.   Musculoskeletal: Negative for arthralgias, back pain, gait problem and myalgias.   Allergic/Immunologic: Negative for environmental allergies, food allergies and immunocompromised state.   Neurological:  "Negative for syncope, weakness and headaches.   Hematological: Negative for adenopathy. Does not bruise/bleed easily.   Psychiatric/Behavioral: Negative for agitation and confusion. The patient is not nervous/anxious.        Physical Exam    Vitals:    11/02/17 1306   BP: 122/84   Pulse: 80   Resp: 14   Temp: 98.9 °F (37.2 °C)   TempSrc: Temporal Artery    SpO2: 98%   Weight: 194 lb (88 kg)   Height: 64\" (162.6 cm)     Body mass index is 33.3 kg/(m^2).      GENERAL: Alert, well-appearing female appearing her stated age who is in no apparent distress.   HEENT: Sclera anicteric. Head normocephalic, atraumatic. Mucus membranes moist.   NECK: Trachea midline, supple, without masses.  No thyromegaly.   BREASTS: Deferred  CARDIOVASCULAR: Normal rate, regular rhythm, no murmurs, rubs, or gallops.  No peripheral edema.  RESPIRATORY: Clear to auscultation bilaterally, normal respiratory effort  BACK:  No CVA tenderness, no vertebral tenderness on palpation  GASTROINTESTINAL:  Abdomen is obese, soft, mild tenderness to palpation of the right lower quadrant and less so of the left lower quadranttender, non-distended, no rebound or guarding, no masses, or hernias.  SKIN:  Warm, dry, well-perfused.  All visible areas intact.  No rashes, lesions, ulcers.  PSYCHIATRIC: AO x3, with appropriate affect, normal thought processes.  NEUROLOGIC: No focal deficits.  Moves extremities well.  MUSCULOSKELETAL: Normal gait and station.   EXTREMITIES:   No cyanosis, clubbing, symmetric.  LYMPHATICS:  No cervical or inguinal adenopathy noted.     PELVIC exam:    Pain with insertion of speculum.  No lesions of cervix, vulva, or vagina noted.  On bimanual exam, the patient had marked tenderness at the posterior fourchette.  She also had mild pain with palpation of the levator muscle group and tenderness with bladder palpation.  Apical discomfort appreciated as well, but not to the extent of the posterior fourchette and bladder.     ECOG PS " 0    PROCEDURES:  none    Diagnostic Data:      Dr. Valdivia personally reviewed ultrasound images from 10/11/2017.  Small residual benign-appearing ovarian functional cyst identified.  Approximately 2 cm in size.  No results found for: ]      Assessment/Plan   This is a 37 y.o. woman with nausea/vomiting and right sided abdominal pain with a left ovarian remnant.    1. Nausea/Vomiting  - Patient has not been to see a GI specialist since this began.  Concern for gastroparesis given imaging and symptoms.  - This is not being caused by the ovarian remnant  - Rx for Reglan for trial while waiting for appointment with GI    2. Pelvic pain  - Patient has pain on bimanual exam consistent with vaginismus/dysparrunia and pelvic muscle disfunction  - Pelvic floor rehab recommended, looking into options closer to her home  - Interstitial cystitis should be part of the differential diagnosis given the tenderness of the bladder on examination.  Treatment/evaluation should be considered if patient does not respond to pelvic floor physical therapy.  -Right lower quadrant pain present prior to surgery and now persistent with evidence of left ovarian remnant on imaging    3. Ovarian remnant  -  This does not seem to be causing any of the symptoms the patient is having.  - Currently 2cm on recent ultrasound  - Discussed Risks/Benefits/Alternatives with regards to surgical treatment. No Intervention at this time  - Precautions reviewed    FOLLOW UP: Patient is to call with an update, follow-up on an as-needed basis.      Patient was seen and examined with Dr. Borges,  resident, who performed portions of the examination and documentation for this patient's care under my direct supervision.    Raeann Valdivia MD  11/05/17  1:22 PM    Note: Speech recognition transcription software was used to dictate portions of this document.  An attempt at proofreading has been made though minor errors in transcription may still be present.   Please do not hesitate to call our office with any questions.

## 2017-11-05 PROBLEM — N99.83 OVARIAN REMNANT SYNDROME: Status: ACTIVE | Noted: 2017-11-05

## 2017-11-08 ENCOUNTER — TELEPHONE (OUTPATIENT)
Dept: FAMILY MEDICINE CLINIC | Facility: CLINIC | Age: 37
End: 2017-11-08

## 2017-11-08 DIAGNOSIS — N99.83 OVARIAN REMNANT SYNDROME: ICD-10-CM

## 2017-11-08 DIAGNOSIS — R10.2 PELVIC PAIN: Primary | ICD-10-CM

## 2017-11-21 ENCOUNTER — OFFICE VISIT (OUTPATIENT)
Dept: FAMILY MEDICINE CLINIC | Facility: CLINIC | Age: 37
End: 2017-11-21

## 2017-11-21 VITALS
RESPIRATION RATE: 16 BRPM | SYSTOLIC BLOOD PRESSURE: 107 MMHG | BODY MASS INDEX: 32.78 KG/M2 | TEMPERATURE: 98.8 F | HEART RATE: 85 BPM | DIASTOLIC BLOOD PRESSURE: 81 MMHG | WEIGHT: 192 LBS | HEIGHT: 64 IN | OXYGEN SATURATION: 100 %

## 2017-11-21 DIAGNOSIS — J40 BRONCHITIS: Primary | ICD-10-CM

## 2017-11-21 PROCEDURE — 99213 OFFICE O/P EST LOW 20 MIN: CPT | Performed by: INTERNAL MEDICINE

## 2017-11-21 RX ORDER — METHYLPREDNISOLONE 4 MG/1
TABLET ORAL
Qty: 1 EACH | Refills: 0 | Status: SHIPPED | OUTPATIENT
Start: 2017-11-21 | End: 2017-11-29

## 2017-11-21 RX ORDER — DOXYCYCLINE 100 MG/1
100 CAPSULE ORAL 2 TIMES DAILY
Qty: 20 CAPSULE | Refills: 0 | Status: SHIPPED | OUTPATIENT
Start: 2017-11-21 | End: 2017-11-29

## 2017-11-21 NOTE — PROGRESS NOTES
Subjective     Patient ID: Ange Wade is a 37 y.o. female. Patient is here for management of multiple medical problems.     Chief Complaint   Patient presents with   • Cough     x 1 1/2 weeks, patient diagnosed with Bronchitis on 11/15/17 at ER in  [place] patient was given a Zpak but she states she is not any better   • Sore Throat     x 1 1/2 weeks   • Earache     right ear pain     History of Present Illness     Pt still with ab pain.   Pt set to go to pelvic PT      Pt developed cough and bronchitis 2 weeks ago and seen in er in [place]. Given z pack no better.  Chills no fever. + soa.   Wheezing.  No smoking but lives around a smoker.        The following portions of the patient's history were reviewed and updated as appropriate: allergies, current medications, past family history, past medical history, past social history, past surgical history and problem list.    Review of Systems   Constitutional: Positive for fatigue.   Respiratory: Positive for cough, chest tightness, shortness of breath and wheezing.    Cardiovascular: Negative for chest pain.   Gastrointestinal: Negative for abdominal distention and abdominal pain.   All other systems reviewed and are negative.      Current Outpatient Prescriptions:   •  amitriptyline (ELAVIL) 100 MG tablet, Take 2 tablets by mouth Every Night., Disp: 60 tablet, Rfl: 1  •  metoclopramide (REGLAN) 10 MG tablet, Take 1 tablet by mouth 4 (Four) Times a Day Before Meals & at Bedtime., Disp: 30 tablet, Rfl: 0  •  Omeprazole (EQ OMEPRAZOLE) 20 MG tablet delayed-release, Take 20 mg by mouth Daily., Disp: 30 each, Rfl: 11  •  promethazine (PHENERGAN) 12.5 MG tablet, TAKE 2 TABLETS BY MOUTH EVERY SIX HOURS AS NEEDED FOR NAUSEA AND VOMITING, Disp: 20 tablet, Rfl: 0  •  SUMAtriptan (IMITREX) 100 MG tablet, take 1 tablet by mouth one time AS NEEDED at onset of MIGRAINE - for up to 1 dose, Disp: 15 tablet, Rfl: 1  •  TROKENDI XR 50 MG capsule sustained-release 24 hr, TAKE 1  "CAPSULE BY MOUTH ONE TIME A DAY , Disp: 30 capsule, Rfl: 1  •  vitamin B-12 (CYANOCOBALAMIN) 1000 MCG tablet, Take 1,000 mcg by mouth Daily. (PATIENT USURE MCG STRENGTH), Disp: , Rfl:   •  doxycycline (MONODOX) 100 MG capsule, Take 1 capsule by mouth 2 (Two) Times a Day., Disp: 20 capsule, Rfl: 0  •  MethylPREDNISolone (MEDROL, BAR,) 4 MG tablet, Take as directed on package instructions., Disp: 1 each, Rfl: 0    Objective      Blood pressure 107/81, pulse 85, temperature 98.8 °F (37.1 °C), temperature source Temporal Artery , resp. rate 16, height 64\" (162.6 cm), weight 192 lb (87.1 kg), SpO2 100 %, not currently breastfeeding.    Physical Exam     General Appearance:    Alert, cooperative, no distress, appears stated age   Head:    Normocephalic, without obvious abnormality, atraumatic   Eyes:    PERRL, conjunctiva/corneas clear, EOM's intact   Ears:    Normal TM's and external ear canals, both ears   Nose:   Nares normal, septum midline, mucosa normal, no drainage   or sinus tenderness   Throat:   Lips, mucosa, and tongue normal; teeth and gums normal   Neck:   Supple, symmetrical, trachea midline, no adenopathy;        thyroid:  No enlargement/tenderness/nodules; no carotid    bruit or JVD   Back:     Symmetric, no curvature, ROM normal, no CVA tenderness   Lungs:     Wheezing and course rhochci.    to auscultation bilaterally, respirations unlabored   Chest wall:    No tenderness or deformity   Heart:    Regular rate and rhythm, S1 and S2 normal, no murmur,        rub or gallop   Abdomen:     Soft, non-tender, bowel sounds active all four quadrants,     no masses, no organomegaly   Extremities:   Extremities normal, atraumatic, no cyanosis or edema   Pulses:   2+ and symmetric all extremities   Skin:   Skin color, texture, turgor normal, no rashes or lesions   Lymph nodes:   Cervical, supraclavicular, and axillary nodes normal   Neurologic:   CNII-XII intact. Normal strength, sensation and reflexes       " throughout      Results for orders placed or performed during the hospital encounter of 10/20/17   Tissue Pathology Exam - Tissue, Gastric, Antrum   Result Value Ref Range    Case Report       Surgical Pathology Report                         Case: [id number]                                  Authorizing Provider:  Jaylene Doherty MD         Collected:           10/20/2017 09:34 AM          Ordering Location:     [facility]    Received:            10/20/2017 02:47 PM                                 [facility]                                                                    Pathologist:           Talon JO MD                                                           Specimens:   1) - Gastric, Antrum, antrum biopsy                                                                 2) - Stomach, gastric polyp                                                                Final Diagnosis       See Scanned Report        Embedded Images           Assessment/Plan    Pt on vetolin inh.      Ange was seen today for cough, sore throat and earache.    Diagnoses and all orders for this visit:    Bronchitis  -     doxycycline (MONODOX) 100 MG capsule; Take 1 capsule by mouth 2 (Two) Times a Day.  -     MethylPREDNISolone (MEDROL, BAR,) 4 MG tablet; Take as directed on package instructions.      Return if symptoms worsen or fail to improve.          There are no Patient Instructions on file for this visit.     Chase Correa MD    Assessment/Plan

## 2017-11-29 ENCOUNTER — OFFICE VISIT (OUTPATIENT)
Dept: FAMILY MEDICINE CLINIC | Facility: CLINIC | Age: 37
End: 2017-11-29

## 2017-11-29 ENCOUNTER — PREP FOR SURGERY (OUTPATIENT)
Dept: OTHER | Facility: HOSPITAL | Age: 37
End: 2017-11-29

## 2017-11-29 VITALS
SYSTOLIC BLOOD PRESSURE: 108 MMHG | WEIGHT: 195 LBS | HEART RATE: 74 BPM | OXYGEN SATURATION: 100 % | DIASTOLIC BLOOD PRESSURE: 77 MMHG | RESPIRATION RATE: 16 BRPM | HEIGHT: 64 IN | BODY MASS INDEX: 33.29 KG/M2 | TEMPERATURE: 97.6 F

## 2017-11-29 DIAGNOSIS — R11.2 NON-INTRACTABLE VOMITING WITH NAUSEA, UNSPECIFIED VOMITING TYPE: ICD-10-CM

## 2017-11-29 DIAGNOSIS — K82.8 BILIARY DYSKINESIA: Primary | ICD-10-CM

## 2017-11-29 DIAGNOSIS — J40 BRONCHITIS: ICD-10-CM

## 2017-11-29 DIAGNOSIS — B00.9 HSV-2 INFECTION: Primary | ICD-10-CM

## 2017-11-29 PROCEDURE — 99214 OFFICE O/P EST MOD 30 MIN: CPT | Performed by: INTERNAL MEDICINE

## 2017-11-29 RX ORDER — DEXTROMETHORPHAN HYDROBROMIDE AND PROMETHAZINE HYDROCHLORIDE 15; 6.25 MG/5ML; MG/5ML
5 SYRUP ORAL 4 TIMES DAILY PRN
Qty: 240 ML | Refills: 0 | Status: SHIPPED | OUTPATIENT
Start: 2017-11-29 | End: 2017-12-07 | Stop reason: SDUPTHER

## 2017-11-29 RX ORDER — CETIRIZINE HYDROCHLORIDE 10 MG/1
10 TABLET ORAL DAILY
Qty: 30 TABLET | Refills: 0 | Status: SHIPPED | OUTPATIENT
Start: 2017-11-29 | End: 2018-01-05

## 2017-11-29 RX ORDER — METHYLPREDNISOLONE 4 MG/1
TABLET ORAL
Qty: 1 EACH | Refills: 0 | Status: SHIPPED | OUTPATIENT
Start: 2017-11-29 | End: 2018-01-05

## 2017-11-29 RX ORDER — BENZONATATE 100 MG/1
100 CAPSULE ORAL 3 TIMES DAILY PRN
Qty: 60 CAPSULE | Refills: 0 | Status: SHIPPED | OUTPATIENT
Start: 2017-11-29 | End: 2018-01-05

## 2017-11-29 RX ORDER — VALACYCLOVIR HYDROCHLORIDE 1 G/1
1000 TABLET, FILM COATED ORAL 2 TIMES DAILY
Qty: 20 TABLET | Refills: 0 | Status: SHIPPED | OUTPATIENT
Start: 2017-11-29 | End: 2018-01-05

## 2017-11-29 RX ORDER — CLARITHROMYCIN 500 MG/1
500 TABLET, COATED ORAL 2 TIMES DAILY
Qty: 20 TABLET | Refills: 0 | Status: SHIPPED | OUTPATIENT
Start: 2017-11-29 | End: 2018-01-05

## 2017-11-29 NOTE — PROGRESS NOTES
Subjective     Patient ID: Ange Wade is a 37 y.o. female. Patient is here for management of multiple medical problems.     Chief Complaint   Patient presents with   • Bronchitis     patient states she is not feeling any better, still coughing, chest hurts    • Earache     right ear pain     History of Present Illness     persistant cough.   Steroids and abx with mild help. No otc cough meds.      Recent out break on right lower [place] nena from dental surgery.      The following portions of the patient's history were reviewed and updated as appropriate: allergies, current medications, past family history, past medical history, past social history, past surgical history and problem list.    Review of Systems   Constitutional: Positive for fatigue.   Respiratory: Positive for cough, shortness of breath and wheezing.    Skin: Positive for wound.   All other systems reviewed and are negative.      Current Outpatient Prescriptions:   •  amitriptyline (ELAVIL) 100 MG tablet, Take 2 tablets by mouth Every Night., Disp: 60 tablet, Rfl: 1  •  metoclopramide (REGLAN) 10 MG tablet, Take 1 tablet by mouth 4 (Four) Times a Day Before Meals & at Bedtime., Disp: 30 tablet, Rfl: 0  •  Omeprazole (EQ OMEPRAZOLE) 20 MG tablet delayed-release, Take 20 mg by mouth Daily., Disp: 30 each, Rfl: 11  •  promethazine (PHENERGAN) 12.5 MG tablet, TAKE 2 TABLETS BY MOUTH EVERY SIX HOURS AS NEEDED FOR NAUSEA AND VOMITING, Disp: 20 tablet, Rfl: 0  •  SUMAtriptan (IMITREX) 100 MG tablet, take 1 tablet by mouth one time AS NEEDED at onset of MIGRAINE - for up to 1 dose, Disp: 15 tablet, Rfl: 1  •  TROKENDI XR 50 MG capsule sustained-release 24 hr, TAKE 1 CAPSULE BY MOUTH ONE TIME A DAY , Disp: 30 capsule, Rfl: 1  •  vitamin B-12 (CYANOCOBALAMIN) 1000 MCG tablet, Take 1,000 mcg by mouth Daily. (PATIENT USURE MCG STRENGTH), Disp: , Rfl:   •  benzonatate (TESSALON PERLES) 100 MG capsule, Take 1 capsule by mouth 3 (Three) Times a Day As Needed for  "Cough., Disp: 60 capsule, Rfl: 0  •  cetirizine (zyrTEC) 10 MG tablet, Take 1 tablet by mouth Daily., Disp: 30 tablet, Rfl: 0  •  clarithromycin (BIAXIN) 500 MG tablet, Take 1 tablet by mouth 2 (Two) Times a Day., Disp: 20 tablet, Rfl: 0  •  MethylPREDNISolone (MEDROL, BAR,) 4 MG tablet, Take as directed on package instructions., Disp: 1 each, Rfl: 0  •  promethazine-dextromethorphan (PROMETHAZINE-DM) 6.25-15 MG/5ML syrup, Take 5 mL by mouth 4 (Four) Times a Day As Needed for Cough., Disp: 240 mL, Rfl: 0  •  valACYclovir (VALTREX) 1000 MG tablet, Take 1 tablet by mouth 2 (Two) Times a Day., Disp: 20 tablet, Rfl: 0    Objective      Blood pressure 108/77, pulse 74, temperature 97.6 °F (36.4 °C), temperature source Oral, resp. rate 16, height 64\" (162.6 cm), weight 195 lb (88.5 kg), SpO2 100 %, not currently breastfeeding.    Physical Exam     General Appearance:    Alert, cooperative, no distress, appears stated age   Head:    Normocephalic, without obvious abnormality, atraumatic   Eyes:    PERRL, conjunctiva/corneas clear, EOM's intact   Ears:    Normal TM's and external ear canals, both ears   Nose:   Nares normal, septum midline, mucosa normal, no drainage   or sinus tenderness   Throat:   Lips, mucosa, and tongue normal; teeth and gums normal   Neck:   Supple, symmetrical, trachea midline, no adenopathy;        thyroid:  No enlargement/tenderness/nodules; no carotid    bruit or JVD   Back:     Symmetric, no curvature, ROM normal, no CVA tenderness   Lungs:     wheezing auscultation bilaterally, respirations unlabored   Chest wall:    No tenderness or deformity   Heart:    Regular rate and rhythm, S1 and S2 normal, no murmur,        rub or gallop   Abdomen:     Soft, non-tender, bowel sounds active all four quadrants,     no masses, no organomegaly   Extremities:   Extremities normal, atraumatic, no cyanosis or edema   Pulses:   2+ and symmetric all extremities   Skin:   Herpetic lesions on right lower lip to mid " line.     Lymph nodes:   Cervical, supraclavicular, and axillary nodes normal   Neurologic:   CNII-XII intact. Normal strength, sensation and reflexes       throughout      Results for orders placed or performed during the hospital encounter of 10/20/17   Tissue Pathology Exam - Tissue, Gastric, Antrum   Result Value Ref Range    Case Report       Surgical Pathology Report                         Case: [id number]                                  Authorizing Provider:  Jaylene Doherty MD         Collected:           10/20/2017 09:34 AM          Ordering Location:     [facility]    Received:            10/20/2017 02:47 PM                                 [facility]                                                                    Pathologist:           Talon JO MD                                                           Specimens:   1) - Gastric, Antrum, antrum biopsy                                                                 2) - Stomach, gastric polyp                                                                Final Diagnosis       See Scanned Report        Embedded Images           Assessment/Plan       Ange was seen today for bronchitis and earache.    Diagnoses and all orders for this visit:    HSV-2 infection  -     valACYclovir (VALTREX) 1000 MG tablet; Take 1 tablet by mouth 2 (Two) Times a Day.  -     C4+C3  -     Complement, Total  -     C1 Esterase Inhibitor, Functional    Bronchitis  -     promethazine-dextromethorphan (PROMETHAZINE-DM) 6.25-15 MG/5ML syrup; Take 5 mL by mouth 4 (Four) Times a Day As Needed for Cough.  -     MethylPREDNISolone (MEDROL, BAR,) 4 MG tablet; Take as directed on package instructions.  -     clarithromycin (BIAXIN) 500 MG tablet; Take 1 tablet by mouth 2 (Two) Times a Day.  -     benzonatate (TESSALON PERLES) 100 MG capsule; Take 1 capsule by mouth 3 (Three) Times a Day As Needed for Cough.  -     C4+C3  -     Complement, Total  -     C1 Esterase  Inhibitor, Functional    Non-intractable vomiting with nausea, unspecified vomiting type  -     cetirizine (zyrTEC) 10 MG tablet; Take 1 tablet by mouth Daily.  -     C4+C3  -     Complement, Total  -     C1 Esterase Inhibitor, Functional      Return if symptoms worsen or fail to improve.          There are no Patient Instructions on file for this visit.     Chase Correa MD    Assessment/Plan

## 2017-11-30 RX ORDER — TOPIRAMATE 50 MG/1
CAPSULE, EXTENDED RELEASE ORAL
Qty: 30 CAPSULE | Refills: 1 | Status: SHIPPED | OUTPATIENT
Start: 2017-11-30 | End: 2018-01-05

## 2017-12-01 LAB
C1INH FUNCTIONAL/C1INH TOTAL MFR SERPL: 87 %MEAN NORMAL
C3 SERPL-MCNC: 155 MG/DL (ref 82–167)
C4 SERPL-MCNC: 23 MG/DL (ref 14–44)
CH50 SERPL-ACNC: >60 U/ML (ref 42–60)

## 2017-12-04 ENCOUNTER — PROCEDURE VISIT (OUTPATIENT)
Dept: NEUROLOGY | Facility: CLINIC | Age: 37
End: 2017-12-04

## 2017-12-04 VITALS
OXYGEN SATURATION: 98 % | SYSTOLIC BLOOD PRESSURE: 104 MMHG | BODY MASS INDEX: 33.29 KG/M2 | DIASTOLIC BLOOD PRESSURE: 72 MMHG | HEART RATE: 70 BPM | HEIGHT: 64 IN | WEIGHT: 195 LBS

## 2017-12-04 DIAGNOSIS — G44.209 TENSION HEADACHE: Primary | ICD-10-CM

## 2017-12-04 DIAGNOSIS — M54.2 NECK PAIN: ICD-10-CM

## 2017-12-04 DIAGNOSIS — M79.18 MYOFASCIAL MUSCLE PAIN: ICD-10-CM

## 2017-12-04 PROCEDURE — 20553 NJX 1/MLT TRIGGER POINTS 3/>: CPT | Performed by: PSYCHIATRY & NEUROLOGY

## 2017-12-04 RX ORDER — METHYLPREDNISOLONE ACETATE 40 MG/ML
200 INJECTION, SUSPENSION INTRA-ARTICULAR; INTRALESIONAL; INTRAMUSCULAR; SOFT TISSUE ONCE
Status: COMPLETED | OUTPATIENT
Start: 2017-12-04 | End: 2017-12-04

## 2017-12-04 RX ORDER — BUPIVACAINE HYDROCHLORIDE 7.5 MG/ML
5 INJECTION, SOLUTION EPIDURAL; RETROBULBAR ONCE
Status: DISCONTINUED | OUTPATIENT
Start: 2017-12-04 | End: 2019-01-07

## 2017-12-04 RX ADMIN — METHYLPREDNISOLONE ACETATE 200 MG: 40 INJECTION, SUSPENSION INTRA-ARTICULAR; INTRALESIONAL; INTRAMUSCULAR; SOFT TISSUE at 18:13

## 2017-12-04 NOTE — PROGRESS NOTES
Procedure note    Procedure: Trigger point injection    Indication: Patient has persistent migraine headache 30 out of 30 day with headache lasting more than 4 hours at a time despite taking Imitrex, Zofran, Phenergan, amitriptyline and Topamax.  Patient reported that since last injection, she has come off from her pain medication patient was very pleased to see the improvement the dramatic relief of her neck pain and headache.      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 Risks and benefits of the Trigger point injection procedure have been explained to the patient.  Patient has no contraindications such as bleed diathesis, recent acute trauma at the muscle sites, anesthetic allergy or anticoagulation.  Mechanism of trigger point injection has been explained to patient.     Procedure Note:  1.         Patient was positioned comfortably  2.         Before injections are started, 10 Trigger Points sites are identified throughout the bilateral upper trapezius muscle, levator scapulae, and erector spinae muscles.    3.         Overlying skin area was cleaned with Alcohol swab                                                                                                              4.         Before injection, trigger points sites were palpated for local twitch and referred pain to confirms placement                         5.         Local anesthetic was mixed with Depo-Medrol (5 cc of Marcaine 0.5% mixed with 5 cc of Depo-Medrol at 40 mg/ml - for a total of 10 cc)  6.         30 gauge ½ inch needle was utilized to ensure patient comfort          7.         I started with the most tender spot in above mentioned Trigger Points   1.   Localize most tender spot within taut muscle-fibers  2.   Fix tender spot between fingers (1-2 cm in size)   1.   Prevents from rolling away from needle  2.   Controls subcutaneous bleeding  3.   Before injection, patient was instructed of possible  pain on injection  4.   Direct needle at 30 degree angle off skin   8.         Insert needle into skin 1-2 cm from Trigger Point   9.         Advance needle into Trigger Point   10.       Use 1cc anesthetic at each Trigger Points identified in step #2  11.       Redirect needle and reinject                                                                                              1.   Withdraw needle to subcutaneous tissue  2.   Redirect needle into adjacent tender areas  3.   Repeat until local twitch or tautness resolves  12.   After each of the 10 injections I held direct pressure at injection site for 2 minutes to prevents hematoma formation  13.   The same procedure was repeated for other Tender Points located in the upper trapezius muscle, levator scapulae and erector spinae bilaterally  14.   Patient was instructed to gently stretch injected areas to full active range of motion in all directions and was instructed to repeat range of motion three times after injection  15.   Post-Procedure patient was instructed to avoid over-using injected area for 3-4 days   1.   Maintain active range of motion of injected muscle  2.   Patient applies ice to injected areas for a few hours  3.   Anticipate post-injection soreness for 3-4 days  There was no evidence of complications from injection was noted such as local Skin Infection  at injection site or local hematoma at injection site      Marcaine lot [id number]  Expiration date April 2019  NDC #5515 0-1 6 9-1 0    Depo-Medrol lots number [id number]  Expiration date April 2020  NDC #0009-028 0-02    Esa Miller MD, FAAN  12/04/2017

## 2017-12-07 DIAGNOSIS — J40 BRONCHITIS: ICD-10-CM

## 2017-12-07 RX ORDER — DEXTROMETHORPHAN HYDROBROMIDE AND PROMETHAZINE HYDROCHLORIDE 15; 6.25 MG/5ML; MG/5ML
SYRUP ORAL
Qty: 240 ML | Refills: 0 | Status: SHIPPED | OUTPATIENT
Start: 2017-12-07 | End: 2017-12-20 | Stop reason: SDUPTHER

## 2017-12-20 DIAGNOSIS — J40 BRONCHITIS: ICD-10-CM

## 2017-12-20 RX ORDER — DEXTROMETHORPHAN HYDROBROMIDE AND PROMETHAZINE HYDROCHLORIDE 15; 6.25 MG/5ML; MG/5ML
SYRUP ORAL
Qty: 240 ML | Refills: 0 | Status: SHIPPED | OUTPATIENT
Start: 2017-12-20 | End: 2018-01-05

## 2017-12-22 DIAGNOSIS — R11.2 NAUSEA AND VOMITING, INTRACTABILITY OF VOMITING NOT SPECIFIED, UNSPECIFIED VOMITING TYPE: ICD-10-CM

## 2017-12-22 RX ORDER — PROMETHAZINE HYDROCHLORIDE 12.5 MG/1
TABLET ORAL
Qty: 20 TABLET | Refills: 0 | Status: SHIPPED | OUTPATIENT
Start: 2017-12-22 | End: 2017-12-29 | Stop reason: SDUPTHER

## 2017-12-26 ENCOUNTER — OUTSIDE FACILITY SERVICE (OUTPATIENT)
Dept: SURGERY | Facility: CLINIC | Age: 37
End: 2017-12-26

## 2017-12-26 PROCEDURE — 47563 LAPARO CHOLECYSTECTOMY/GRAPH: CPT | Performed by: SURGERY

## 2017-12-29 DIAGNOSIS — R11.2 NAUSEA AND VOMITING, INTRACTABILITY OF VOMITING NOT SPECIFIED, UNSPECIFIED VOMITING TYPE: ICD-10-CM

## 2017-12-29 RX ORDER — PROMETHAZINE HYDROCHLORIDE 12.5 MG/1
TABLET ORAL
Qty: 20 TABLET | Refills: 0 | Status: SHIPPED | OUTPATIENT
Start: 2017-12-29 | End: 2018-01-05

## 2018-01-01 DIAGNOSIS — R11.2 NAUSEA AND VOMITING, INTRACTABILITY OF VOMITING NOT SPECIFIED, UNSPECIFIED VOMITING TYPE: ICD-10-CM

## 2018-01-02 RX ORDER — PROMETHAZINE HYDROCHLORIDE 12.5 MG/1
TABLET ORAL
Qty: 20 TABLET | Refills: 0 | OUTPATIENT
Start: 2018-01-02

## 2018-01-05 ENCOUNTER — OFFICE VISIT (OUTPATIENT)
Dept: INTERNAL MEDICINE | Facility: CLINIC | Age: 38
End: 2018-01-05

## 2018-01-05 ENCOUNTER — APPOINTMENT (OUTPATIENT)
Dept: LAB | Facility: HOSPITAL | Age: 38
End: 2018-01-05
Attending: INTERNAL MEDICINE

## 2018-01-05 VITALS
WEIGHT: 189 LBS | OXYGEN SATURATION: 99 % | DIASTOLIC BLOOD PRESSURE: 82 MMHG | HEART RATE: 98 BPM | HEIGHT: 64 IN | TEMPERATURE: 97.5 F | SYSTOLIC BLOOD PRESSURE: 103 MMHG | BODY MASS INDEX: 32.27 KG/M2 | RESPIRATION RATE: 16 BRPM

## 2018-01-05 DIAGNOSIS — R10.13 EPIGASTRIC ABDOMINAL PAIN: Primary | ICD-10-CM

## 2018-01-05 DIAGNOSIS — R11.0 NAUSEA: ICD-10-CM

## 2018-01-05 DIAGNOSIS — K90.0 CELIAC SPRUE: ICD-10-CM

## 2018-01-05 LAB
ALBUMIN SERPL-MCNC: 4.7 G/DL (ref 3.5–5)
ALBUMIN/GLOB SERPL: 1.3 G/DL (ref 1–2)
ALP SERPL-CCNC: 107 U/L (ref 38–126)
ALT SERPL W P-5'-P-CCNC: 37 U/L (ref 13–69)
ANION GAP SERPL CALCULATED.3IONS-SCNC: 15.7 MMOL/L
AST SERPL-CCNC: 20 U/L (ref 15–46)
BASOPHILS # BLD AUTO: 0.09 10*3/MM3 (ref 0–0.2)
BASOPHILS NFR BLD AUTO: 0.8 % (ref 0–2.5)
BILIRUB SERPL-MCNC: 0.6 MG/DL (ref 0.2–1.3)
BUN BLD-MCNC: 17 MG/DL (ref 7–20)
BUN/CREAT SERPL: 21.3 (ref 7.1–23.5)
CALCIUM SPEC-SCNC: 9.7 MG/DL (ref 8.4–10.2)
CHLORIDE SERPL-SCNC: 102 MMOL/L (ref 98–107)
CO2 SERPL-SCNC: 27 MMOL/L (ref 26–30)
CREAT BLD-MCNC: 0.8 MG/DL (ref 0.6–1.3)
DEPRECATED RDW RBC AUTO: 40.6 FL (ref 37–54)
EOSINOPHIL # BLD AUTO: 0.26 10*3/MM3 (ref 0–0.7)
EOSINOPHIL NFR BLD AUTO: 2.4 % (ref 0–7)
ERYTHROCYTE [DISTWIDTH] IN BLOOD BY AUTOMATED COUNT: 13.6 % (ref 11.5–14.5)
GFR SERPL CREATININE-BSD FRML MDRD: 81 ML/MIN/1.73
GLOBULIN UR ELPH-MCNC: 3.7 GM/DL
GLUCOSE BLD-MCNC: 110 MG/DL (ref 74–98)
HCT VFR BLD AUTO: 44 % (ref 37–47)
HGB BLD-MCNC: 14.2 G/DL (ref 12–16)
IMM GRANULOCYTES # BLD: 0.05 10*3/MM3 (ref 0–0.06)
IMM GRANULOCYTES NFR BLD: 0.5 % (ref 0–0.6)
LIPASE SERPL-CCNC: 98 U/L (ref 23–300)
LYMPHOCYTES # BLD AUTO: 2 10*3/MM3 (ref 0.6–3.4)
LYMPHOCYTES NFR BLD AUTO: 18.5 % (ref 10–50)
MCH RBC QN AUTO: 26.8 PG (ref 27–31)
MCHC RBC AUTO-ENTMCNC: 32.3 G/DL (ref 30–37)
MCV RBC AUTO: 83 FL (ref 81–99)
MONOCYTES # BLD AUTO: 0.64 10*3/MM3 (ref 0–0.9)
MONOCYTES NFR BLD AUTO: 5.9 % (ref 0–12)
NEUTROPHILS # BLD AUTO: 7.78 10*3/MM3 (ref 2–6.9)
NEUTROPHILS NFR BLD AUTO: 71.9 % (ref 37–80)
NRBC BLD MANUAL-RTO: 0 /100 WBC (ref 0–0)
PLATELET # BLD AUTO: 309 10*3/MM3 (ref 130–400)
PMV BLD AUTO: 10.6 FL (ref 6–12)
POTASSIUM BLD-SCNC: 3.7 MMOL/L (ref 3.5–5.1)
PROT SERPL-MCNC: 8.4 G/DL (ref 6.3–8.2)
RBC # BLD AUTO: 5.3 10*6/MM3 (ref 4.2–5.4)
SODIUM BLD-SCNC: 141 MMOL/L (ref 137–145)
WBC NRBC COR # BLD: 10.82 10*3/MM3 (ref 4.8–10.8)

## 2018-01-05 PROCEDURE — 80053 COMPREHEN METABOLIC PANEL: CPT | Performed by: INTERNAL MEDICINE

## 2018-01-05 PROCEDURE — 99214 OFFICE O/P EST MOD 30 MIN: CPT | Performed by: INTERNAL MEDICINE

## 2018-01-05 PROCEDURE — 85025 COMPLETE CBC W/AUTO DIFF WBC: CPT | Performed by: INTERNAL MEDICINE

## 2018-01-05 PROCEDURE — 36415 COLL VENOUS BLD VENIPUNCTURE: CPT | Performed by: INTERNAL MEDICINE

## 2018-01-05 PROCEDURE — 83690 ASSAY OF LIPASE: CPT | Performed by: INTERNAL MEDICINE

## 2018-01-05 RX ORDER — PROMETHAZINE HYDROCHLORIDE 25 MG/1
25 TABLET ORAL EVERY 6 HOURS PRN
Qty: 120 TABLET | Refills: 11 | Status: SHIPPED | OUTPATIENT
Start: 2018-01-05 | End: 2018-06-26 | Stop reason: SDUPTHER

## 2018-01-05 NOTE — PROGRESS NOTES
"Subjective     Patient ID: Ange Wade is a 37 y.o. female. Patient is here for management of multiple medical problems.     Chief Complaint   Patient presents with   • Vomiting and Nausea     patient states she had gallbladder surgery last Tuesday and she is still  vomiting     History of Present Illness     Nausea after surgery.  Last week ccy.  Sick prior to surgery.  No fever no chills.  Now vomiting. No diarrhea.   No fever no chills. No worse with food..    Pt ran out of phenergan.          The following portions of the patient's history were reviewed and updated as appropriate: allergies, current medications, past family history, past medical history, past social history, past surgical history and problem list.    Review of Systems   Constitutional: Positive for fatigue.   Gastrointestinal: Positive for nausea. Negative for abdominal pain.   All other systems reviewed and are negative.      Current Outpatient Prescriptions:   •  Omeprazole (EQ OMEPRAZOLE) 20 MG tablet delayed-release, Take 20 mg by mouth Daily., Disp: 30 each, Rfl: 11  •  vitamin B-12 (CYANOCOBALAMIN) 1000 MCG tablet, Take 1,000 mcg by mouth Daily. (PATIENT USURE MCG STRENGTH), Disp: , Rfl:   •  promethazine (PHENERGAN) 25 MG tablet, Take 1 tablet by mouth Every 6 (Six) Hours As Needed for Nausea or Vomiting., Disp: 120 tablet, Rfl: 11    Current Facility-Administered Medications:   •  bupivacaine PF (MARCAINE) 0.75 % injection 37.5 mg, 5 mL, Injection, Once, Esa Miller MD, FAAN    Objective      Blood pressure 103/82, pulse 98, temperature 97.5 °F (36.4 °C), resp. rate 16, height 162.6 cm (64\"), weight 85.7 kg (189 lb), SpO2 99 %, not currently breastfeeding.    Physical Exam     General Appearance:    Alert, cooperative, no distress, appears stated age   Head:    Normocephalic, without obvious abnormality, atraumatic   Eyes:    PERRL, conjunctiva/corneas clear, EOM's intact   Ears:    Normal TM's and external ear canals, both ears "   Nose:   Nares normal, septum midline, mucosa normal, no drainage   or sinus tenderness   Throat:   Lips, mucosa, and tongue normal; teeth and gums normal   Neck:   Supple, symmetrical, trachea midline, no adenopathy;        thyroid:  No enlargement/tenderness/nodules; no carotid    bruit or JVD   Back:     Symmetric, no curvature, ROM normal, no CVA tenderness   Lungs:     Clear to auscultation bilaterally, respirations unlabored   Chest wall:    No tenderness or deformity   Heart:    Regular rate and rhythm, S1 and S2 normal, no murmur,        rub or gallop   Abdomen:    ttp right upper ab.      Extremities:   Extremities normal, atraumatic, no cyanosis or edema   Pulses:   2+ and symmetric all extremities   Skin:   Skin color, texture, turgor normal, no rashes or lesions   Lymph nodes:   Cervical, supraclavicular, and axillary nodes normal   Neurologic:   CNII-XII intact. Normal strength, sensation and reflexes       throughout      Results for orders placed or performed in visit on 11/29/17   C4+C3   Result Value Ref Range    C3 Complement 155 82 - 167 mg/dL    C4 Complement 23 14 - 44 mg/dL   Complement, Total   Result Value Ref Range    Complement, Total (CH50) >60 (H) 42 - 60 U/mL   C1 Esterase Inhibitor, Functional   Result Value Ref Range    C1 Esterase Inhibitor Function 87 %mean normal         Assessment/Plan       Ange was seen today for vomiting and nausea.    Diagnoses and all orders for this visit:    Epigastric abdominal pain  -     Ambulatory Referral to Gynecology  -     promethazine (PHENERGAN) 25 MG tablet; Take 1 tablet by mouth Every 6 (Six) Hours As Needed for Nausea or Vomiting.  -     CBC & Differential  -     Comprehensive Metabolic Panel  -     Lipase    Nausea  -     Ambulatory Referral to Gynecology  -     promethazine (PHENERGAN) 25 MG tablet; Take 1 tablet by mouth Every 6 (Six) Hours As Needed for Nausea or Vomiting.  -     CBC & Differential  -     Comprehensive Metabolic Panel  -      Lipase    Celiac sprue      Return if symptoms worsen or fail to improve.          There are no Patient Instructions on file for this visit.     Chase Correa MD    Assessment/Plan

## 2018-01-10 ENCOUNTER — TELEPHONE (OUTPATIENT)
Dept: INTERNAL MEDICINE | Facility: CLINIC | Age: 38
End: 2018-01-10

## 2018-01-12 ENCOUNTER — OFFICE VISIT (OUTPATIENT)
Dept: INTERNAL MEDICINE | Facility: CLINIC | Age: 38
End: 2018-01-12

## 2018-01-12 ENCOUNTER — OFFICE VISIT (OUTPATIENT)
Dept: SURGERY | Facility: CLINIC | Age: 38
End: 2018-01-12

## 2018-01-12 VITALS
HEIGHT: 64 IN | SYSTOLIC BLOOD PRESSURE: 114 MMHG | TEMPERATURE: 97.8 F | BODY MASS INDEX: 32.27 KG/M2 | HEART RATE: 80 BPM | WEIGHT: 189 LBS | RESPIRATION RATE: 16 BRPM | DIASTOLIC BLOOD PRESSURE: 79 MMHG | OXYGEN SATURATION: 99 %

## 2018-01-12 VITALS
WEIGHT: 189 LBS | DIASTOLIC BLOOD PRESSURE: 70 MMHG | HEART RATE: 74 BPM | HEIGHT: 64 IN | SYSTOLIC BLOOD PRESSURE: 120 MMHG | TEMPERATURE: 98.2 F | BODY MASS INDEX: 32.27 KG/M2 | OXYGEN SATURATION: 99 %

## 2018-01-12 DIAGNOSIS — Z48.89 POSTOPERATIVE VISIT: Primary | ICD-10-CM

## 2018-01-12 DIAGNOSIS — R11.2 NAUSEA AND VOMITING, INTRACTABILITY OF VOMITING NOT SPECIFIED, UNSPECIFIED VOMITING TYPE: ICD-10-CM

## 2018-01-12 DIAGNOSIS — G47.33 OSA (OBSTRUCTIVE SLEEP APNEA): ICD-10-CM

## 2018-01-12 DIAGNOSIS — R10.11 RIGHT UPPER QUADRANT ABDOMINAL PAIN: ICD-10-CM

## 2018-01-12 DIAGNOSIS — R11.15 INTRACTABLE CYCLICAL VOMITING WITH NAUSEA: Primary | ICD-10-CM

## 2018-01-12 DIAGNOSIS — K90.0 CELIAC SPRUE: ICD-10-CM

## 2018-01-12 DIAGNOSIS — R06.83 SNORING: ICD-10-CM

## 2018-01-12 DIAGNOSIS — R53.83 FATIGUE, UNSPECIFIED TYPE: ICD-10-CM

## 2018-01-12 LAB
ALBUMIN SERPL-MCNC: 4.7 G/DL (ref 3.5–5)
ALBUMIN/GLOB SERPL: 1.5 G/DL (ref 1–2)
ALP SERPL-CCNC: 102 U/L (ref 38–126)
ALT SERPL-CCNC: 33 U/L (ref 13–69)
AST SERPL-CCNC: 19 U/L (ref 15–46)
BASOPHILS # BLD AUTO: 0.06 10*3/MM3 (ref 0–0.2)
BASOPHILS NFR BLD AUTO: 0.7 % (ref 0–2.5)
BILIRUB SERPL-MCNC: 0.5 MG/DL (ref 0.2–1.3)
BUN SERPL-MCNC: 15 MG/DL (ref 7–20)
BUN/CREAT SERPL: 18.8 (ref 7.1–23.5)
CALCIUM SERPL-MCNC: 9.7 MG/DL (ref 8.4–10.2)
CHLORIDE SERPL-SCNC: 103 MMOL/L (ref 98–107)
CO2 SERPL-SCNC: 28 MMOL/L (ref 26–30)
CREAT SERPL-MCNC: 0.8 MG/DL (ref 0.6–1.3)
EOSINOPHIL # BLD AUTO: 0.11 10*3/MM3 (ref 0–0.7)
EOSINOPHIL NFR BLD AUTO: 1.3 % (ref 0–7)
ERYTHROCYTE [DISTWIDTH] IN BLOOD BY AUTOMATED COUNT: 13.7 % (ref 11.5–14.5)
GLOBULIN SER CALC-MCNC: 3.2 GM/DL
GLUCOSE SERPL-MCNC: 88 MG/DL (ref 74–98)
HCT VFR BLD AUTO: 42.6 % (ref 37–47)
HGB BLD-MCNC: 13.8 G/DL (ref 12–16)
IMM GRANULOCYTES # BLD: 0.03 10*3/MM3 (ref 0–0.06)
IMM GRANULOCYTES NFR BLD: 0.4 % (ref 0–0.6)
LYMPHOCYTES # BLD AUTO: 1.52 10*3/MM3 (ref 0.6–3.4)
LYMPHOCYTES NFR BLD AUTO: 18.1 % (ref 10–50)
MCH RBC QN AUTO: 26.5 PG (ref 27–31)
MCHC RBC AUTO-ENTMCNC: 32.4 G/DL (ref 30–37)
MCV RBC AUTO: 81.8 FL (ref 81–99)
MONOCYTES # BLD AUTO: 0.42 10*3/MM3 (ref 0–0.9)
MONOCYTES NFR BLD AUTO: 5 % (ref 0–12)
NEUTROPHILS # BLD AUTO: 6.28 10*3/MM3 (ref 2–6.9)
NEUTROPHILS NFR BLD AUTO: 74.5 % (ref 37–80)
NRBC BLD AUTO-RTO: 0 /100 WBC (ref 0–0)
PLATELET # BLD AUTO: 281 10*3/MM3 (ref 130–400)
POTASSIUM SERPL-SCNC: 4.4 MMOL/L (ref 3.5–5.1)
PROT SERPL-MCNC: 7.9 G/DL (ref 6.3–8.2)
RBC # BLD AUTO: 5.21 10*6/MM3 (ref 4.2–5.4)
SODIUM SERPL-SCNC: 140 MMOL/L (ref 137–145)
T4 FREE SERPL-MCNC: 1.44 NG/DL (ref 0.78–2.19)
TSH SERPL DL<=0.005 MIU/L-ACNC: 0.7 MIU/ML (ref 0.47–4.68)
VIT B12 SERPL-MCNC: 767 PG/ML (ref 239–931)
WBC # BLD AUTO: 8.42 10*3/MM3 (ref 4.8–10.8)

## 2018-01-12 PROCEDURE — 99214 OFFICE O/P EST MOD 30 MIN: CPT | Performed by: INTERNAL MEDICINE

## 2018-01-12 PROCEDURE — 99024 POSTOP FOLLOW-UP VISIT: CPT | Performed by: SURGERY

## 2018-01-12 NOTE — PROGRESS NOTES
"Subjective     Patient ID: Ange Wade is a 37 y.o. female. Patient is here for management of multiple medical problems.     Chief Complaint   Patient presents with   • Abdominal Pain     patient still feels sore in the abdominal area and she is still continuing to vomit on and off      History of Present Illness     Ab pain pain and still vomiting. Almost 1 year.    Pt states she has gone. Gluten free with no improvement.      Tired all the time.  Wakes tired.  Takes naps. Snores.      The following portions of the patient's history were reviewed and updated as appropriate: allergies, current medications, past family history, past medical history, past social history, past surgical history and problem list.    Review of Systems   Constitutional: Positive for fatigue. Negative for diaphoresis.   Gastrointestinal: Positive for abdominal pain, nausea and vomiting.   All other systems reviewed and are negative.      Current Outpatient Prescriptions:   •  Omeprazole (EQ OMEPRAZOLE) 20 MG tablet delayed-release, Take 20 mg by mouth Daily., Disp: 30 each, Rfl: 11  •  promethazine (PHENERGAN) 25 MG tablet, Take 1 tablet by mouth Every 6 (Six) Hours As Needed for Nausea or Vomiting., Disp: 120 tablet, Rfl: 11  •  vitamin B-12 (CYANOCOBALAMIN) 1000 MCG tablet, Take 1,000 mcg by mouth Daily. (PATIENT USURE MCG STRENGTH), Disp: , Rfl:     Current Facility-Administered Medications:   •  bupivacaine PF (MARCAINE) 0.75 % injection 37.5 mg, 5 mL, Injection, Once, Esa Miller MD, FAAN    Objective      Blood pressure 114/79, pulse 80, temperature 97.8 °F (36.6 °C), temperature source Oral, resp. rate 16, height 162.6 cm (64\"), weight 85.7 kg (189 lb), SpO2 99 %, not currently breastfeeding.    Physical Exam     General Appearance:    Alert, cooperative, no distress, appears stated age   Head:    Normocephalic, without obvious abnormality, atraumatic   Eyes:    PERRL, conjunctiva/corneas clear, EOM's intact   Ears:    Normal " TM's and external ear canals, both ears   Nose:   Nares normal, septum midline, mucosa normal, no drainage   or sinus tenderness   Throat:   narrowed oral air way.  Lips, mucosa, and tongue normal; teeth and gums normal   Neck:   Supple, symmetrical, trachea midline, no adenopathy;        thyroid:  No enlargement/tenderness/nodules; no carotid    bruit or JVD   Back:     Symmetric, no curvature, ROM normal, no CVA tenderness   Lungs:     Clear to auscultation bilaterally, respirations unlabored   Chest wall:    No tenderness or deformity   Heart:    Regular rate and rhythm, S1 and S2 normal, no murmur,        rub or gallop   Abdomen:     Soft, non-tender, bowel sounds active all four quadrants,     no masses, no organomegaly   Extremities:   Extremities normal, atraumatic, no cyanosis or edema   Pulses:   2+ and symmetric all extremities   Skin:   Skin color, texture, turgor normal, no rashes or lesions   Lymph nodes:   Cervical, supraclavicular, and axillary nodes normal   Neurologic:   CNII-XII intact. Normal strength, sensation and reflexes       throughout      Results for orders placed or performed in visit on 01/05/18   Comprehensive Metabolic Panel   Result Value Ref Range    Glucose 110 (H) 74 - 98 mg/dL    BUN 17 7 - 20 mg/dL    Creatinine 0.80 0.60 - 1.30 mg/dL    Sodium 141 137 - 145 mmol/L    Potassium 3.7 3.5 - 5.1 mmol/L    Chloride 102 98 - 107 mmol/L    CO2 27.0 26.0 - 30.0 mmol/L    Calcium 9.7 8.4 - 10.2 mg/dL    Total Protein 8.4 (H) 6.3 - 8.2 g/dL    Albumin 4.70 3.50 - 5.00 g/dL    ALT (SGPT) 37 13 - 69 U/L    AST (SGOT) 20 15 - 46 U/L    Alkaline Phosphatase 107 38 - 126 U/L    Total Bilirubin 0.6 0.2 - 1.3 mg/dL    eGFR Non African Amer 81 >60 mL/min/1.73    Globulin 3.7 gm/dL    A/G Ratio 1.3 1.0 - 2.0 g/dL    BUN/Creatinine Ratio 21.3 7.1 - 23.5    Anion Gap 15.7 mmol/L   Lipase   Result Value Ref Range    Lipase 98 23 - 300 U/L   CBC Auto Differential   Result Value Ref Range    WBC 10.82  (H) 4.80 - 10.80 10*3/mm3    RBC 5.30 4.20 - 5.40 10*6/mm3    Hemoglobin 14.2 12.0 - 16.0 g/dL    Hematocrit 44.0 37.0 - 47.0 %    MCV 83.0 81.0 - 99.0 fL    MCH 26.8 (L) 27.0 - 31.0 pg    MCHC 32.3 30.0 - 37.0 g/dL    RDW 13.6 11.5 - 14.5 %    RDW-SD 40.6 37.0 - 54.0 fl    MPV 10.6 6.0 - 12.0 fL    Platelets 309 130 - 400 10*3/mm3    Neutrophil % 71.9 37.0 - 80.0 %    Lymphocyte % 18.5 10.0 - 50.0 %    Monocyte % 5.9 0.0 - 12.0 %    Eosinophil % 2.4 0.0 - 7.0 %    Basophil % 0.8 0.0 - 2.5 %    Immature Grans % 0.5 0.0 - 0.6 %    Neutrophils, Absolute 7.78 (H) 2.00 - 6.90 10*3/mm3    Lymphocytes, Absolute 2.00 0.60 - 3.40 10*3/mm3    Monocytes, Absolute 0.64 0.00 - 0.90 10*3/mm3    Eosinophils, Absolute 0.26 0.00 - 0.70 10*3/mm3    Basophils, Absolute 0.09 0.00 - 0.20 10*3/mm3    Immature Grans, Absolute 0.05 0.00 - 0.06 10*3/mm3    nRBC 0.0 0.0 - 0.0 /100 WBC         Assessment/Plan       Ange was seen today for abdominal pain.    Diagnoses and all orders for this visit:    Intractable cyclical vomiting with nausea  -     TSH  -     T4, Free  -     Vitamin B12  -     Comprehensive Metabolic Panel  -     CBC & Differential  -     Ambulatory Referral to ENT (Otolaryngology)    Right upper quadrant abdominal pain  -     TSH  -     T4, Free  -     Vitamin B12  -     Comprehensive Metabolic Panel  -     CBC & Differential    Fatigue, unspecified type  -     TSH  -     T4, Free  -     Vitamin B12  -     Comprehensive Metabolic Panel  -     CBC & Differential  -     Ambulatory Referral to ENT (Otolaryngology)    Celiac sprue  -     Comprehensive Metabolic Panel  -     CBC & Differential    Snoring  -     Ambulatory Referral to ENT (Otolaryngology)    CLAUDIA (obstructive sleep apnea)  -     Ambulatory Referral to ENT (Otolaryngology)      Return in about 4 weeks (around 2/9/2018).          There are no Patient Instructions on file for this visit.     Chase Correa MD    Assessment/Plan

## 2018-01-12 NOTE — PROGRESS NOTES
Patient: Ange Wade    YOB: 1980    Date: 01/12/2018    Primary Care Provider: Chase Correa MD    Reason for Consultation: Follow-up lap yvette    Chief Complaint:   Chief Complaint   Patient presents with   • Post-op     s/p lap yvette       History of present illness:  I saw the patient in the office today as a followup from their recent laparoscopic cholecystectomy.  They complain of RUQ pain, nausea and vomiting. White blood cell count and liver function tests are normal.  Previous EGD was unremarkable.    The following portions of the patient's history were reviewed and updated as appropriate: allergies, current medications, past family history, past medical history, past social history, past surgical history and problem list.      Vital Signs:   Temp:  [97.8 °F (36.6 °C)-98.2 °F (36.8 °C)] 98.2 °F (36.8 °C)  Heart Rate:  [74-80] 74  Resp:  [16] 16  BP: (114-120)/(70-79) 120/70    Physical Exam:   General Appearance:    Alert, cooperative, in no acute distress   Abdomen:     no masses, no organomegaly, soft non-tender, non-distended, no guarding, wounds are well healed     Assessment / Plan :    1. Postoperative visit    2. Nausea and vomiting, intractability of vomiting not specified, unspecified vomiting type    3. Right upper quadrant abdominal pain        I did discuss the situation with the patient today in the office and they have done well from their recent laparoscopic cholecystectomy.  I have released the patient back to normal activity, they understand that they need to be careful about heavy lifting.  I need to see the patient back in the office only if they are having further problems, they know to call me if they are.Patient likely to have had GI flu symptoms.  No evidence of abdominal pathology.  Patient reassured, follow-up in 4 weeks if symptoms not resolve.    Electronically signed by Jaylene Doherty MD  01/12/18      Scribed for Jaylene Doherty MD by Re Oconnell.  1/12/2018  1:24 PM

## 2018-01-19 ENCOUNTER — OFFICE VISIT (OUTPATIENT)
Dept: SURGERY | Facility: CLINIC | Age: 38
End: 2018-01-19

## 2018-01-19 ENCOUNTER — OFFICE VISIT (OUTPATIENT)
Dept: INTERNAL MEDICINE | Facility: CLINIC | Age: 38
End: 2018-01-19

## 2018-01-19 VITALS
WEIGHT: 195 LBS | TEMPERATURE: 97.9 F | HEART RATE: 85 BPM | OXYGEN SATURATION: 100 % | RESPIRATION RATE: 16 BRPM | BODY MASS INDEX: 33.29 KG/M2 | HEIGHT: 64 IN | DIASTOLIC BLOOD PRESSURE: 76 MMHG | SYSTOLIC BLOOD PRESSURE: 108 MMHG

## 2018-01-19 VITALS
TEMPERATURE: 98.8 F | SYSTOLIC BLOOD PRESSURE: 110 MMHG | HEIGHT: 64 IN | OXYGEN SATURATION: 99 % | HEART RATE: 80 BPM | WEIGHT: 195 LBS | DIASTOLIC BLOOD PRESSURE: 80 MMHG | BODY MASS INDEX: 33.29 KG/M2

## 2018-01-19 DIAGNOSIS — IMO0001 POSTOPERATIVE WOUND INFECTION, INITIAL ENCOUNTER: Primary | ICD-10-CM

## 2018-01-19 DIAGNOSIS — Z48.89 POSTOPERATIVE VISIT: Primary | ICD-10-CM

## 2018-01-19 PROCEDURE — 99213 OFFICE O/P EST LOW 20 MIN: CPT | Performed by: INTERNAL MEDICINE

## 2018-01-19 PROCEDURE — 99024 POSTOP FOLLOW-UP VISIT: CPT | Performed by: SURGERY

## 2018-01-19 RX ORDER — CIPROFLOXACIN 500 MG/1
500 TABLET, FILM COATED ORAL
Qty: 10 TABLET | Refills: 0 | Status: SHIPPED | OUTPATIENT
Start: 2018-01-19 | End: 2018-02-14

## 2018-01-19 NOTE — PROGRESS NOTES
Patient: Ange Wade    YOB: 1980    Date: 01/19/2018    Primary Care Provider: Chase Correa MD    Reason for Consultation: Follow-up lap pito    Chief Complaint:   Chief Complaint   Patient presents with   • Post-op     S/P LAP PITO       History of present illness:  I saw the patient in the office today as a followup from their recent laparoscopic cholecystectomy done on 12/26/2017 for biliary dyskinesia . Patient complains of a slight drainage from upper incision without odor.     The following portions of the patient's history were reviewed and updated as appropriate: allergies, current medications, past family history, past medical history, past social history, past surgical history and problem list.      Vital Signs:   Temp:  [97.9 °F (36.6 °C)-98.8 °F (37.1 °C)] 98.8 °F (37.1 °C)  Heart Rate:  [80-85] 80  Resp:  [16] 16  BP: (108-110)/(76-80) 110/80    Physical Exam:   General Appearance:    Alert, cooperative, in no acute distress   Abdomen:     no masses, no organomegaly, soft non-tender, non-distended, no guarding, wounds are well healed.  No drainage or redness.       Assessment / Plan :    1. Postoperative visit        I did discuss the situation with the patient today in the office and they have done well from their recent laparoscopic cholecystectomy.  Drainage is minimal, completely resolved.  I have released the patient back to normal activity, they understand that they need to be careful about heavy lifting.  I need to see the patient back in the office only if they are having further problems, they know to call me if they are.    Electronically signed by Jaylene Doherty MD  01/19/18      Scribed for Jaylene Doherty MD by Re Oconnell. 1/19/2018  2:54 PM

## 2018-01-19 NOTE — PROGRESS NOTES
"Subjective     Patient ID: Ange Wade is a 37 y.o. female. Patient is here for management of multiple medical problems.     Chief Complaint   Patient presents with   • Vomiting     patient states she is still having diarrhea, and her incision has been draining x 1 day     History of Present Illness     Fluid pus whit leaking from surgical wound in the mid epigastric area.    S/p ccy.  vomiting still until today.    Pt denies diarrhea.   '    The following portions of the patient's history were reviewed and updated as appropriate: allergies, current medications, past family history, past medical history, past social history, past surgical history and problem list.    Review of Systems   Constitutional: Negative for fatigue.   Respiratory: Negative for shortness of breath.    Musculoskeletal: Negative for gait problem, joint swelling and myalgias.   All other systems reviewed and are negative.      Current Outpatient Prescriptions:   •  Omeprazole (EQ OMEPRAZOLE) 20 MG tablet delayed-release, Take 20 mg by mouth Daily., Disp: 30 each, Rfl: 11  •  promethazine (PHENERGAN) 25 MG tablet, Take 1 tablet by mouth Every 6 (Six) Hours As Needed for Nausea or Vomiting., Disp: 120 tablet, Rfl: 11  •  vitamin B-12 (CYANOCOBALAMIN) 1000 MCG tablet, Take 1,000 mcg by mouth Daily. (PATIENT USURE MCG STRENGTH), Disp: , Rfl:   •  ciprofloxacin (CIPRO) 500 MG tablet, Take 1 tablet by mouth Daily., Disp: 10 tablet, Rfl: 0    Current Facility-Administered Medications:   •  bupivacaine PF (MARCAINE) 0.75 % injection 37.5 mg, 5 mL, Injection, Once, Esa Miller MD, FAAN    Objective      Blood pressure 108/76, pulse 85, temperature 97.9 °F (36.6 °C), temperature source Oral, resp. rate 16, height 162.6 cm (64\"), weight 88.5 kg (195 lb), SpO2 100 %, not currently breastfeeding.    Physical Exam     General Appearance:    Alert, cooperative, no distress, appears stated age   Head:    Normocephalic, without obvious abnormality, " atraumatic   Eyes:    PERRL, conjunctiva/corneas clear, EOM's intact   Ears:    Normal TM's and external ear canals, both ears   Nose:   Nares normal, septum midline, mucosa normal, no drainage   or sinus tenderness   Throat:   Lips, mucosa, and tongue normal; teeth and gums normal   Neck:   Supple, symmetrical, trachea midline, no adenopathy;        thyroid:  No enlargement/tenderness/nodules; no carotid    bruit or JVD   Back:     Symmetric, no curvature, ROM normal, no CVA tenderness   Lungs:     Clear to auscultation bilaterally, respirations unlabored   Chest wall:    No tenderness or deformity   Heart:    Regular rate and rhythm, S1 and S2 normal, no murmur,        rub or gallop   Abdomen:     Soft, non-tender, bowel sounds active all four quadrants,     no masses, no organomegaly   Extremities:  normal.      Pulses:   2+ and symmetric all extremities   Skin:   Surgical wound look good. No pus currently. No mass under wound. No erythema.     Lymph nodes:   Cervical, supraclavicular, and axillary nodes normal   Neurologic:   CNII-XII intact. Normal strength, sensation and reflexes       throughout      Results for orders placed or performed in visit on 01/12/18   TSH   Result Value Ref Range    TSH 0.703 0.470 - 4.680 mIU/mL   T4, Free   Result Value Ref Range    Free T4 1.44 0.78 - 2.19 ng/dL   Vitamin B12   Result Value Ref Range    Vitamin B-12 767 239 - 931 pg/mL   Comprehensive Metabolic Panel   Result Value Ref Range    Glucose 88 74 - 98 mg/dL    BUN 15 7 - 20 mg/dL    Creatinine 0.80 0.60 - 1.30 mg/dL    eGFR Non African Am 81 >60 mL/min/1.73    eGFR African Am 98 >60 mL/min/1.73    BUN/Creatinine Ratio 18.8 7.1 - 23.5    Sodium 140 137 - 145 mmol/L    Potassium 4.4 3.5 - 5.1 mmol/L    Chloride 103 98 - 107 mmol/L    Total CO2 28.0 26.0 - 30.0 mmol/L    Calcium 9.7 8.4 - 10.2 mg/dL    Total Protein 7.9 6.3 - 8.2 g/dL    Albumin 4.70 3.50 - 5.00 g/dL    Globulin 3.2 gm/dL    A/G Ratio 1.5 1.0 - 2.0 g/dL     Total Bilirubin 0.5 0.2 - 1.3 mg/dL    Alkaline Phosphatase 102 38 - 126 U/L    AST (SGOT) 19 15 - 46 U/L    ALT (SGPT) 33 13 - 69 U/L   CBC & Differential   Result Value Ref Range    WBC 8.42 4.80 - 10.80 10*3/mm3    RBC 5.21 4.20 - 5.40 10*6/mm3    Hemoglobin 13.8 12.0 - 16.0 g/dL    Hematocrit 42.6 37.0 - 47.0 %    MCV 81.8 81.0 - 99.0 fL    MCH 26.5 (L) 27.0 - 31.0 pg    MCHC 32.4 30.0 - 37.0 g/dL    RDW 13.7 11.5 - 14.5 %    Platelets 281 130 - 400 10*3/mm3    Neutrophil Rel % 74.5 37.0 - 80.0 %    Lymphocyte Rel % 18.1 10.0 - 50.0 %    Monocyte Rel % 5.0 0.0 - 12.0 %    Eosinophil Rel % 1.3 0.0 - 7.0 %    Basophil Rel % 0.7 0.0 - 2.5 %    Neutrophils Absolute 6.28 2.00 - 6.90 10*3/mm3    Lymphocytes Absolute 1.52 0.60 - 3.40 10*3/mm3    Monocytes Absolute 0.42 0.00 - 0.90 10*3/mm3    Eosinophils Absolute 0.11 0.00 - 0.70 10*3/mm3    Basophils Absolute 0.06 0.00 - 0.20 10*3/mm3    Immature Granulocyte Rel % 0.4 0.0 - 0.6 %    Immature Grans Absolute 0.03 0.00 - 0.06 10*3/mm3    nRBC 0.0 0.0 - 0.0 /100 WBC         Assessment/Plan       Ange was seen today for vomiting.    Diagnoses and all orders for this visit:    Postoperative wound infection, initial encounter  -     ciprofloxacin (CIPRO) 500 MG tablet; Take 1 tablet by mouth Daily.      Return if symptoms worsen or fail to improve.          There are no Patient Instructions on file for this visit.     Chase Correa MD    Assessment/Plan

## 2018-02-14 ENCOUNTER — OFFICE VISIT (OUTPATIENT)
Dept: INTERNAL MEDICINE | Facility: CLINIC | Age: 38
End: 2018-02-14

## 2018-02-14 VITALS
WEIGHT: 198 LBS | BODY MASS INDEX: 33.8 KG/M2 | OXYGEN SATURATION: 98 % | RESPIRATION RATE: 16 BRPM | HEART RATE: 102 BPM | HEIGHT: 64 IN | TEMPERATURE: 98.7 F | DIASTOLIC BLOOD PRESSURE: 106 MMHG | SYSTOLIC BLOOD PRESSURE: 124 MMHG

## 2018-02-14 DIAGNOSIS — J40 BRONCHITIS: Primary | ICD-10-CM

## 2018-02-14 PROCEDURE — 99213 OFFICE O/P EST LOW 20 MIN: CPT | Performed by: INTERNAL MEDICINE

## 2018-02-14 RX ORDER — OXYBUTYNIN CHLORIDE 10 MG/1
10 TABLET, EXTENDED RELEASE ORAL DAILY
COMMUNITY
End: 2018-04-26

## 2018-02-14 RX ORDER — AMOXICILLIN AND CLAVULANATE POTASSIUM 875; 125 MG/1; MG/1
1 TABLET, FILM COATED ORAL EVERY 12 HOURS SCHEDULED
Qty: 20 TABLET | Refills: 0 | Status: SHIPPED | OUTPATIENT
Start: 2018-02-14 | End: 2018-02-23

## 2018-02-14 NOTE — PROGRESS NOTES
Subjective     Patient ID: Ange Wade is a 37 y.o. female. Patient is here for management of multiple medical problems.     Chief Complaint   Patient presents with   • Fatigue     follow-up   • Cough     coughing x 3 days, chest hurts when coughing and when breathing   • Earache     left ear pain   • Nasal Congestion     patient has a runny nose     History of Present Illness   3 days of cough congestion.  Pain in left ear.   Using thearflu. No helping.    Feels like bronchitis.      Seen Dr Keith.*/        The following portions of the patient's history were reviewed and updated as appropriate: allergies, current medications, past family history, past medical history, past social history, past surgical history and problem list.    Review of Systems   Constitutional: Negative for appetite change and fatigue.   HENT: Positive for congestion, ear discharge, facial swelling, postnasal drip, rhinorrhea, sinus pain and sinus pressure.    Cardiovascular: Negative for chest pain.   Psychiatric/Behavioral: Positive for sleep disturbance. The patient is not nervous/anxious.    All other systems reviewed and are negative.      Current Outpatient Prescriptions:   •  Omeprazole (EQ OMEPRAZOLE) 20 MG tablet delayed-release, Take 20 mg by mouth Daily., Disp: 30 each, Rfl: 11  •  oxybutynin XL (DITROPAN-XL) 10 MG 24 hr tablet, Take 10 mg by mouth Daily., Disp: , Rfl:   •  promethazine (PHENERGAN) 25 MG tablet, Take 1 tablet by mouth Every 6 (Six) Hours As Needed for Nausea or Vomiting., Disp: 120 tablet, Rfl: 11  •  vitamin B-12 (CYANOCOBALAMIN) 1000 MCG tablet, Take 1,000 mcg by mouth Daily. (PATIENT USURE MCG STRENGTH), Disp: , Rfl:   •  amoxicillin-clavulanate (AUGMENTIN) 875-125 MG per tablet, Take 1 tablet by mouth Every 12 (Twelve) Hours., Disp: 20 tablet, Rfl: 0    Current Facility-Administered Medications:   •  bupivacaine PF (MARCAINE) 0.75 % injection 37.5 mg, 5 mL, Injection, Once, Esa Miller MD,  "FAAN    Objective      Blood pressure (!) 124/106, pulse 102, temperature 98.7 °F (37.1 °C), temperature source Oral, resp. rate 16, height 162.6 cm (64\"), weight 89.8 kg (198 lb), SpO2 98 %, not currently breastfeeding.    Physical Exam     General Appearance:    Alert, cooperative, no distress, appears stated age   Head:    Normocephalic, without obvious abnormality, atraumatic   Eyes:    PERRL, conjunctiva/corneas clear, EOM's intact   Ears:    Normal TM's and external ear canals, both ears   Nose:   Nares normal, septum midline, mucosa normal, no drainage   or sinus tenderness   Throat:   Lips, mucosa, and tongue normal; teeth and gums normal   Neck:   Supple, symmetrical, trachea midline, no adenopathy;        thyroid:  No enlargement/tenderness/nodules; no carotid    bruit or JVD   Back:     Symmetric, no curvature, ROM normal, no CVA tenderness   Lungs:     Wheezing and rhonchi to auscultation bilaterally, respirations unlabored   Chest wall:    No tenderness or deformity   Heart:    Regular rate and rhythm, S1 and S2 normal, no murmur,        rub or gallop   Abdomen:     Soft, non-tender, bowel sounds active all four quadrants,     no masses, no organomegaly   Extremities:   Extremities normal, atraumatic, no cyanosis or edema   Pulses:   2+ and symmetric all extremities   Skin:   Skin color, texture, turgor normal, no rashes or lesions   Lymph nodes:   Cervical, supraclavicular, and axillary nodes normal   Neurologic:   CNII-XII intact. Normal strength, sensation and reflexes       throughout      Results for orders placed or performed in visit on 01/12/18   TSH   Result Value Ref Range    TSH 0.703 0.470 - 4.680 mIU/mL   T4, Free   Result Value Ref Range    Free T4 1.44 0.78 - 2.19 ng/dL   Vitamin B12   Result Value Ref Range    Vitamin B-12 767 239 - 931 pg/mL   Comprehensive Metabolic Panel   Result Value Ref Range    Glucose 88 74 - 98 mg/dL    BUN 15 7 - 20 mg/dL    Creatinine 0.80 0.60 - 1.30 mg/dL    " eGFR Non African Am 81 >60 mL/min/1.73    eGFR African Am 98 >60 mL/min/1.73    BUN/Creatinine Ratio 18.8 7.1 - 23.5    Sodium 140 137 - 145 mmol/L    Potassium 4.4 3.5 - 5.1 mmol/L    Chloride 103 98 - 107 mmol/L    Total CO2 28.0 26.0 - 30.0 mmol/L    Calcium 9.7 8.4 - 10.2 mg/dL    Total Protein 7.9 6.3 - 8.2 g/dL    Albumin 4.70 3.50 - 5.00 g/dL    Globulin 3.2 gm/dL    A/G Ratio 1.5 1.0 - 2.0 g/dL    Total Bilirubin 0.5 0.2 - 1.3 mg/dL    Alkaline Phosphatase 102 38 - 126 U/L    AST (SGOT) 19 15 - 46 U/L    ALT (SGPT) 33 13 - 69 U/L   CBC & Differential   Result Value Ref Range    WBC 8.42 4.80 - 10.80 10*3/mm3    RBC 5.21 4.20 - 5.40 10*6/mm3    Hemoglobin 13.8 12.0 - 16.0 g/dL    Hematocrit 42.6 37.0 - 47.0 %    MCV 81.8 81.0 - 99.0 fL    MCH 26.5 (L) 27.0 - 31.0 pg    MCHC 32.4 30.0 - 37.0 g/dL    RDW 13.7 11.5 - 14.5 %    Platelets 281 130 - 400 10*3/mm3    Neutrophil Rel % 74.5 37.0 - 80.0 %    Lymphocyte Rel % 18.1 10.0 - 50.0 %    Monocyte Rel % 5.0 0.0 - 12.0 %    Eosinophil Rel % 1.3 0.0 - 7.0 %    Basophil Rel % 0.7 0.0 - 2.5 %    Neutrophils Absolute 6.28 2.00 - 6.90 10*3/mm3    Lymphocytes Absolute 1.52 0.60 - 3.40 10*3/mm3    Monocytes Absolute 0.42 0.00 - 0.90 10*3/mm3    Eosinophils Absolute 0.11 0.00 - 0.70 10*3/mm3    Basophils Absolute 0.06 0.00 - 0.20 10*3/mm3    Immature Granulocyte Rel % 0.4 0.0 - 0.6 %    Immature Grans Absolute 0.03 0.00 - 0.06 10*3/mm3    nRBC 0.0 0.0 - 0.0 /100 WBC         Assessment/Plan       Ange was seen today for fatigue, cough, earache and nasal congestion.    Diagnoses and all orders for this visit:    Bronchitis    Other orders  -     amoxicillin-clavulanate (AUGMENTIN) 875-125 MG per tablet; Take 1 tablet by mouth Every 12 (Twelve) Hours.      Return in about 3 months (around 5/14/2018), or if symptoms worsen or fail to improve.          There are no Patient Instructions on file for this visit.     Chase Correa MD    Assessment/Plan

## 2018-02-23 ENCOUNTER — OFFICE VISIT (OUTPATIENT)
Dept: INTERNAL MEDICINE | Facility: CLINIC | Age: 38
End: 2018-02-23

## 2018-02-23 VITALS
TEMPERATURE: 97.9 F | DIASTOLIC BLOOD PRESSURE: 69 MMHG | HEART RATE: 72 BPM | SYSTOLIC BLOOD PRESSURE: 107 MMHG | BODY MASS INDEX: 33.8 KG/M2 | RESPIRATION RATE: 16 BRPM | OXYGEN SATURATION: 99 % | WEIGHT: 198 LBS | HEIGHT: 64 IN

## 2018-02-23 DIAGNOSIS — J40 BRONCHITIS: ICD-10-CM

## 2018-02-23 DIAGNOSIS — J06.9 UPPER RESPIRATORY TRACT INFECTION, UNSPECIFIED TYPE: Primary | ICD-10-CM

## 2018-02-23 PROCEDURE — 99213 OFFICE O/P EST LOW 20 MIN: CPT | Performed by: INTERNAL MEDICINE

## 2018-02-23 RX ORDER — ESTRADIOL 0.07 MG/D
1 FILM, EXTENDED RELEASE TRANSDERMAL 2 TIMES WEEKLY
COMMUNITY
End: 2018-10-05 | Stop reason: SDDI

## 2018-02-23 RX ORDER — DEXTROMETHORPHAN HYDROBROMIDE AND PROMETHAZINE HYDROCHLORIDE 15; 6.25 MG/5ML; MG/5ML
5 SYRUP ORAL 4 TIMES DAILY PRN
Qty: 240 ML | Refills: 0 | Status: SHIPPED | OUTPATIENT
Start: 2018-02-23 | End: 2018-03-12

## 2018-02-23 RX ORDER — DOXYCYCLINE 100 MG/1
100 CAPSULE ORAL EVERY 12 HOURS SCHEDULED
Qty: 20 CAPSULE | Refills: 0 | Status: SHIPPED | OUTPATIENT
Start: 2018-02-23 | End: 2018-03-12

## 2018-02-23 NOTE — PROGRESS NOTES
Subjective     Patient ID: Ange Wade is a 37 y.o. female. Patient is here for management of multiple medical problems.     Chief Complaint   Patient presents with   • Earache     left ear pain   • Cough     coughing up yellow/green phlegm, patient has one more day of Augmentin left     History of Present Illness       Uri not getting better.    2 weeks now.  Was on ab.  Thick green discharge.  Sinus pressure and pain. Pain in left ear.      The following portions of the patient's history were reviewed and updated as appropriate: allergies, current medications, past family history, past medical history, past social history, past surgical history and problem list.    Review of Systems   Constitutional: Positive for fatigue. Negative for activity change and fever.   HENT: Positive for congestion, ear pain, postnasal drip, rhinorrhea, sinus pain, sinus pressure and sneezing.    Respiratory: Positive for cough, shortness of breath and wheezing.    All other systems reviewed and are negative.      Current Outpatient Prescriptions:   •  estradiol (MINIVELLE, VIVELLE-DOT) 0.075 MG/24HR patch, Place 1 patch on the skin 2 (Two) Times a Week., Disp: , Rfl:   •  Omeprazole (EQ OMEPRAZOLE) 20 MG tablet delayed-release, Take 20 mg by mouth Daily., Disp: 30 each, Rfl: 11  •  oxybutynin XL (DITROPAN-XL) 10 MG 24 hr tablet, Take 10 mg by mouth Daily., Disp: , Rfl:   •  promethazine (PHENERGAN) 25 MG tablet, Take 1 tablet by mouth Every 6 (Six) Hours As Needed for Nausea or Vomiting., Disp: 120 tablet, Rfl: 11  •  vitamin B-12 (CYANOCOBALAMIN) 1000 MCG tablet, Take 1,000 mcg by mouth Daily. (PATIENT USURE MCG STRENGTH), Disp: , Rfl:   •  doxycycline (MONODOX) 100 MG capsule, Take 1 capsule by mouth Every 12 (Twelve) Hours., Disp: 20 capsule, Rfl: 0  •  promethazine-dextromethorphan (PROMETHAZINE-DM) 6.25-15 MG/5ML syrup, Take 5 mL by mouth 4 (Four) Times a Day As Needed for Cough., Disp: 240 mL, Rfl: 0    Current  "Facility-Administered Medications:   •  bupivacaine PF (MARCAINE) 0.75 % injection 37.5 mg, 5 mL, Injection, Once, Esa Miller MD, FAAN    Objective      Blood pressure 107/69, pulse 72, temperature 97.9 °F (36.6 °C), temperature source Oral, resp. rate 16, height 162.6 cm (64\"), weight 89.8 kg (198 lb), SpO2 99 %, not currently breastfeeding.    Physical Exam     General Appearance:    Alert, cooperative, no distress, appears stated age   Head:    Normocephalic, without obvious abnormality, atraumatic   Eyes:    PERRL, conjunctiva/corneas clear, EOM's intact   Ears:    Normal TM's and external ear canals, both ears   Nose:   Nares normal, septum midline, mucosa normal, no drainage   or sinus tenderness   Throat:   Lips, mucosa, and tongue normal; teeth and gums normal   Neck:   Supple, symmetrical, trachea midline, no adenopathy;        thyroid:  No enlargement/tenderness/nodules; no carotid    bruit or JVD   Back:     Symmetric, no curvature, ROM normal, no CVA tenderness   Lungs:     Wheezing and rhonchi to auscultation bilaterally, respirations unlabored   Chest wall:    No tenderness or deformity   Heart:    Regular rate and rhythm, S1 and S2 normal, no murmur,        rub or gallop   Abdomen:     Soft, non-tender, bowel sounds active all four quadrants,     no masses, no organomegaly   Extremities:   Extremities normal, atraumatic, no cyanosis or edema   Pulses:   2+ and symmetric all extremities   Skin:   Skin color, texture, turgor normal, no rashes or lesions   Lymph nodes:   Cervical, supraclavicular, and axillary nodes normal   Neurologic:   CNII-XII intact. Normal strength, sensation and reflexes       throughout      Results for orders placed or performed in visit on 01/12/18   TSH   Result Value Ref Range    TSH 0.703 0.470 - 4.680 mIU/mL   T4, Free   Result Value Ref Range    Free T4 1.44 0.78 - 2.19 ng/dL   Vitamin B12   Result Value Ref Range    Vitamin B-12 767 239 - 931 pg/mL   Comprehensive " Metabolic Panel   Result Value Ref Range    Glucose 88 74 - 98 mg/dL    BUN 15 7 - 20 mg/dL    Creatinine 0.80 0.60 - 1.30 mg/dL    eGFR Non African Am 81 >60 mL/min/1.73    eGFR African Am 98 >60 mL/min/1.73    BUN/Creatinine Ratio 18.8 7.1 - 23.5    Sodium 140 137 - 145 mmol/L    Potassium 4.4 3.5 - 5.1 mmol/L    Chloride 103 98 - 107 mmol/L    Total CO2 28.0 26.0 - 30.0 mmol/L    Calcium 9.7 8.4 - 10.2 mg/dL    Total Protein 7.9 6.3 - 8.2 g/dL    Albumin 4.70 3.50 - 5.00 g/dL    Globulin 3.2 gm/dL    A/G Ratio 1.5 1.0 - 2.0 g/dL    Total Bilirubin 0.5 0.2 - 1.3 mg/dL    Alkaline Phosphatase 102 38 - 126 U/L    AST (SGOT) 19 15 - 46 U/L    ALT (SGPT) 33 13 - 69 U/L   CBC & Differential   Result Value Ref Range    WBC 8.42 4.80 - 10.80 10*3/mm3    RBC 5.21 4.20 - 5.40 10*6/mm3    Hemoglobin 13.8 12.0 - 16.0 g/dL    Hematocrit 42.6 37.0 - 47.0 %    MCV 81.8 81.0 - 99.0 fL    MCH 26.5 (L) 27.0 - 31.0 pg    MCHC 32.4 30.0 - 37.0 g/dL    RDW 13.7 11.5 - 14.5 %    Platelets 281 130 - 400 10*3/mm3    Neutrophil Rel % 74.5 37.0 - 80.0 %    Lymphocyte Rel % 18.1 10.0 - 50.0 %    Monocyte Rel % 5.0 0.0 - 12.0 %    Eosinophil Rel % 1.3 0.0 - 7.0 %    Basophil Rel % 0.7 0.0 - 2.5 %    Neutrophils Absolute 6.28 2.00 - 6.90 10*3/mm3    Lymphocytes Absolute 1.52 0.60 - 3.40 10*3/mm3    Monocytes Absolute 0.42 0.00 - 0.90 10*3/mm3    Eosinophils Absolute 0.11 0.00 - 0.70 10*3/mm3    Basophils Absolute 0.06 0.00 - 0.20 10*3/mm3    Immature Granulocyte Rel % 0.4 0.0 - 0.6 %    Immature Grans Absolute 0.03 0.00 - 0.06 10*3/mm3    nRBC 0.0 0.0 - 0.0 /100 WBC         Assessment/Plan       Ange was seen today for earache and cough.    Diagnoses and all orders for this visit:    Upper respiratory tract infection, unspecified type  -     doxycycline (MONODOX) 100 MG capsule; Take 1 capsule by mouth Every 12 (Twelve) Hours.  -     promethazine-dextromethorphan (PROMETHAZINE-DM) 6.25-15 MG/5ML syrup; Take 5 mL by mouth 4 (Four) Times a  Day As Needed for Cough.    Bronchitis  -     doxycycline (MONODOX) 100 MG capsule; Take 1 capsule by mouth Every 12 (Twelve) Hours.  -     promethazine-dextromethorphan (PROMETHAZINE-DM) 6.25-15 MG/5ML syrup; Take 5 mL by mouth 4 (Four) Times a Day As Needed for Cough.      Return if symptoms worsen or fail to improve.          There are no Patient Instructions on file for this visit.     Chase Correa MD    Assessment/Plan

## 2018-03-12 ENCOUNTER — PROCEDURE VISIT (OUTPATIENT)
Dept: NEUROLOGY | Facility: CLINIC | Age: 38
End: 2018-03-12

## 2018-03-12 VITALS
DIASTOLIC BLOOD PRESSURE: 72 MMHG | BODY MASS INDEX: 33.8 KG/M2 | WEIGHT: 198 LBS | OXYGEN SATURATION: 97 % | HEART RATE: 69 BPM | HEIGHT: 64 IN | SYSTOLIC BLOOD PRESSURE: 108 MMHG

## 2018-03-12 DIAGNOSIS — M54.2 NECK PAIN: ICD-10-CM

## 2018-03-12 DIAGNOSIS — G44.209 TENSION HEADACHE: ICD-10-CM

## 2018-03-12 DIAGNOSIS — G43.711 INTRACTABLE CHRONIC MIGRAINE WITHOUT AURA AND WITH STATUS MIGRAINOSUS: ICD-10-CM

## 2018-03-12 DIAGNOSIS — M79.18 MYOFASCIAL MUSCLE PAIN: Primary | ICD-10-CM

## 2018-03-12 PROCEDURE — 20553 NJX 1/MLT TRIGGER POINTS 3/>: CPT | Performed by: PSYCHIATRY & NEUROLOGY

## 2018-03-12 RX ORDER — METHYLPREDNISOLONE ACETATE 40 MG/ML
200 INJECTION, SUSPENSION INTRA-ARTICULAR; INTRALESIONAL; INTRAMUSCULAR; SOFT TISSUE ONCE
Status: COMPLETED | OUTPATIENT
Start: 2018-03-12 | End: 2018-03-12

## 2018-03-12 RX ORDER — BUPIVACAINE HYDROCHLORIDE 7.5 MG/ML
5 INJECTION, SOLUTION EPIDURAL; RETROBULBAR ONCE
Status: DISCONTINUED | OUTPATIENT
Start: 2018-03-12 | End: 2019-01-07

## 2018-03-12 RX ADMIN — METHYLPREDNISOLONE ACETATE 200 MG: 40 INJECTION, SUSPENSION INTRA-ARTICULAR; INTRALESIONAL; INTRAMUSCULAR; SOFT TISSUE at 18:23

## 2018-03-12 NOTE — PROGRESS NOTES
Procedure note    Procedure: Trigger point injection    Indication: Patient is a pleasant 37-year-old referred to [facility] neurology [place] for evaluation of persistent migraine headache patient reportedly suffered from migraine headache 30 out of 30 days of the month lasting for more than 4 hours at a time despite Imitrex, Zomig, Zofran, Phenergan , amitriptyline and Topamax.  Patient had benefits from trigger point injection.  Patient reports of dramatic pain relief and patient had been pain-free for the last 3 month patient presented office for 3 month trigger point injection.      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 Risks and benefits of the Trigger point injection procedure have been explained to the patient.  Patient has no contraindications such as bleed diathesis, recent acute trauma at the muscle sites, anesthetic allergy or anticoagulation.  Mechanism of trigger point injection has been explained to patient.     Procedure Note:  1.         Patient was positioned comfortably  2.         Before injections are started, 10 Trigger Points sites are identified throughout the bilateral upper trapezius muscle, levator scapulae, and erector spinae muscles.    3.         Overlying skin area was cleaned with Alcohol swab                                                                                                              4.         Before injection, trigger points sites were palpated for local twitch and referred pain to confirms placement                         5.         Local anesthetic was mixed with Depo-Medrol (5 cc of Marcaine 0.5% mixed with 5 cc of Depo-Medrol at 40 mg/ml - for a total of 10 cc)  6.         30 gauge ½ inch needle was utilized to ensure patient comfort          7.         I started with the most tender spot in above mentioned Trigger Points   1.   Localize most tender spot within taut muscle-fibers  2.   Fix tender spot between  fingers (1-2 cm in size)   1.   Prevents from rolling away from needle  2.   Controls subcutaneous bleeding  3.   Before injection, patient was instructed of possible pain on injection  4.   Direct needle at 30 degree angle off skin   8.         Insert needle into skin 1-2 cm from Trigger Point   9.         Advance needle into Trigger Point   10.       Use 1cc anesthetic at each Trigger Points identified in step #2  11.       Redirect needle and reinject                                                                                              1.   Withdraw needle to subcutaneous tissue  2.   Redirect needle into adjacent tender areas  3.   Repeat until local twitch or tautness resolves  12.   After each of the 10 injections I held direct pressure at injection site for 2 minutes to prevents hematoma formation  13.   The same procedure was repeated for other Tender Points located in the upper trapezius muscle, levator scapulae and erector spinae bilaterally  14.   Patient was instructed to gently stretch injected areas to full active range of motion in all directions and was instructed to repeat range of motion three times after injection  15.   Post-Procedure patient was instructed to avoid over-using injected area for 3-4 days   1.   Maintain active range of motion of injected muscle  2.   Patient applies ice to injected areas for a few hours  3.   Anticipate post-injection soreness for 3-4 days  There was no evidence of complications from injection was noted such as local Skin Infection  at injection site or local hematoma at injection site        Depo-Medrol  NDC #0009-028 0-02  Lot number [id number]  Expiration date July 2020    Marcaine  NDC #5515 0-1 6 9-1 0  Lots number [id number]  Expiration date October 2019    Esa Miller MD, FAAN  03/12/2018

## 2018-03-19 DIAGNOSIS — R11.2 NAUSEA AND VOMITING, INTRACTABILITY OF VOMITING NOT SPECIFIED, UNSPECIFIED VOMITING TYPE: ICD-10-CM

## 2018-03-19 DIAGNOSIS — G43.001 MIGRAINE WITHOUT AURA AND WITH STATUS MIGRAINOSUS, NOT INTRACTABLE: ICD-10-CM

## 2018-03-19 RX ORDER — AMITRIPTYLINE HYDROCHLORIDE 100 MG/1
TABLET, FILM COATED ORAL
Qty: 60 TABLET | Refills: 2 | OUTPATIENT
Start: 2018-03-19

## 2018-03-19 RX ORDER — TOPIRAMATE 50 MG/1
CAPSULE, EXTENDED RELEASE ORAL
Qty: 30 CAPSULE | Refills: 3 | OUTPATIENT
Start: 2018-03-19

## 2018-03-23 RX ORDER — PROMETHAZINE HYDROCHLORIDE 12.5 MG/1
TABLET ORAL
Qty: 20 TABLET | Refills: 0 | OUTPATIENT
Start: 2018-03-23

## 2018-04-26 ENCOUNTER — OFFICE VISIT (OUTPATIENT)
Dept: INTERNAL MEDICINE | Facility: CLINIC | Age: 38
End: 2018-04-26

## 2018-04-26 VITALS
DIASTOLIC BLOOD PRESSURE: 50 MMHG | OXYGEN SATURATION: 99 % | WEIGHT: 198 LBS | HEART RATE: 113 BPM | HEIGHT: 64 IN | BODY MASS INDEX: 33.8 KG/M2 | TEMPERATURE: 97.8 F | SYSTOLIC BLOOD PRESSURE: 100 MMHG

## 2018-04-26 DIAGNOSIS — M54.81 OCCIPITAL NEURALGIA OF LEFT SIDE: ICD-10-CM

## 2018-04-26 DIAGNOSIS — G47.33 OSA (OBSTRUCTIVE SLEEP APNEA): Primary | ICD-10-CM

## 2018-04-26 DIAGNOSIS — T14.8XXA BRUISING: ICD-10-CM

## 2018-04-26 PROCEDURE — 99214 OFFICE O/P EST MOD 30 MIN: CPT | Performed by: INTERNAL MEDICINE

## 2018-04-26 NOTE — PROGRESS NOTES
Subjective     Patient ID: Ange Wade is a 37 y.o. female. Patient is here for management of multiple medical problems.     Chief Complaint   Patient presents with   • Cyst     Knot on back of neck x 2 days; knot has gotten bigger and discolored since last PM   • Fatigue     occasionally light headed     History of Present Illness     Knot on back of head occurred 2 days ago. No trauma.   Feels light headed.   2 days of feeling lightheaded with migraine.    Still with vomiting and dry heaves 2 days after surgery.  Bladder repair. Tumor removal. Left ovary. Took cyst and fibroids off ovary.         The following portions of the patient's history were reviewed and updated as appropriate: allergies, current medications, past family history, past medical history, past social history, past surgical history and problem list.    Review of Systems   Constitutional: Positive for fatigue.   Cardiovascular: Negative for chest pain and palpitations.   Skin: Positive for rash and wound.   Psychiatric/Behavioral: Positive for sleep disturbance.   All other systems reviewed and are negative.      Current Outpatient Prescriptions:   •  estradiol (MINIVELLE, VIVELLE-DOT) 0.075 MG/24HR patch, Place 1 patch on the skin 2 (Two) Times a Week., Disp: , Rfl:   •  Omeprazole (EQ OMEPRAZOLE) 20 MG tablet delayed-release, Take 20 mg by mouth Daily., Disp: 30 each, Rfl: 11  •  Pentoxifylline (TRENTAL PO), Take  by mouth., Disp: , Rfl:   •  promethazine (PHENERGAN) 25 MG tablet, Take 1 tablet by mouth Every 6 (Six) Hours As Needed for Nausea or Vomiting., Disp: 120 tablet, Rfl: 11  •  vitamin B-12 (CYANOCOBALAMIN) 1000 MCG tablet, Take 1,000 mcg by mouth Daily. (PATIENT USURE MCG STRENGTH), Disp: , Rfl:     Current Facility-Administered Medications:   •  bupivacaine PF (MARCAINE) 0.75 % injection 37.5 mg, 5 mL, Injection, Once, Esa Miller MD, FAAN  •  bupivacaine PF (MARCAINE) 0.75 % injection 37.5 mg, 5 mL, Injection, Once, Esa NIEVES  "MD Paul, FAAN  •  bupivacaine PF (MARCAINE) 0.75 % injection 37.5 mg, 5 mL, Injection, Once, Esa Miller MD, FAAN    Objective      Blood pressure 100/50, pulse 113, temperature 97.8 °F (36.6 °C), height 162.6 cm (64\"), weight 89.8 kg (198 lb), SpO2 99 %, not currently breastfeeding.    Physical Exam     General Appearance:    Alert, cooperative, no distress, appears stated age   Head:    Normocephalic, without obvious abnormality, atraumatic   Eyes:    PERRL, conjunctiva/corneas clear, EOM's intact   Ears:    Normal TM's and external ear canals, both ears   Nose:   Nares normal, septum midline, mucosa normal, no drainage   or sinus tenderness   Throat:   Lips, mucosa, and tongue normal; teeth and gums normal   Neck:   Supple, symmetrical, trachea midline, no adenopathy;        thyroid:  No enlargement/tenderness/nodules; no carotid    bruit or JVD   Back:     Symmetric, no curvature, ROM normal, no CVA tenderness   Lungs:     Clear to auscultation bilaterally, respirations unlabored   Chest wall:    No tenderness or deformity   Heart:    Regular rate and rhythm, S1 and S2 normal, no murmur,        rub or gallop   Abdomen:     Soft, non-tender, bowel sounds active all four quadrants,     no masses, no organomegaly   Extremities:   Extremities normal, atraumatic, no cyanosis or edema   Pulses:   2+ and symmetric all extremities   Skin:   Small ecchymotic nodule over left posterior neck.   Lymph nodes:   Cervical, supraclavicular, and axillary nodes normal   Neurologic:   CNII-XII intact. Normal strength, sensation and reflexes       Throughout. ttp over left occipital nerve.        Results for orders placed or performed in visit on 01/12/18   TSH   Result Value Ref Range    TSH 0.703 0.470 - 4.680 mIU/mL   T4, Free   Result Value Ref Range    Free T4 1.44 0.78 - 2.19 ng/dL   Vitamin B12   Result Value Ref Range    Vitamin B-12 767 239 - 931 pg/mL   Comprehensive Metabolic Panel   Result Value Ref Range    Glucose " 88 74 - 98 mg/dL    BUN 15 7 - 20 mg/dL    Creatinine 0.80 0.60 - 1.30 mg/dL    eGFR Non African Am 81 >60 mL/min/1.73    eGFR African Am 98 >60 mL/min/1.73    BUN/Creatinine Ratio 18.8 7.1 - 23.5    Sodium 140 137 - 145 mmol/L    Potassium 4.4 3.5 - 5.1 mmol/L    Chloride 103 98 - 107 mmol/L    Total CO2 28.0 26.0 - 30.0 mmol/L    Calcium 9.7 8.4 - 10.2 mg/dL    Total Protein 7.9 6.3 - 8.2 g/dL    Albumin 4.70 3.50 - 5.00 g/dL    Globulin 3.2 gm/dL    A/G Ratio 1.5 1.0 - 2.0 g/dL    Total Bilirubin 0.5 0.2 - 1.3 mg/dL    Alkaline Phosphatase 102 38 - 126 U/L    AST (SGOT) 19 15 - 46 U/L    ALT (SGPT) 33 13 - 69 U/L   CBC & Differential   Result Value Ref Range    WBC 8.42 4.80 - 10.80 10*3/mm3    RBC 5.21 4.20 - 5.40 10*6/mm3    Hemoglobin 13.8 12.0 - 16.0 g/dL    Hematocrit 42.6 37.0 - 47.0 %    MCV 81.8 81.0 - 99.0 fL    MCH 26.5 (L) 27.0 - 31.0 pg    MCHC 32.4 30.0 - 37.0 g/dL    RDW 13.7 11.5 - 14.5 %    Platelets 281 130 - 400 10*3/mm3    Neutrophil Rel % 74.5 37.0 - 80.0 %    Lymphocyte Rel % 18.1 10.0 - 50.0 %    Monocyte Rel % 5.0 0.0 - 12.0 %    Eosinophil Rel % 1.3 0.0 - 7.0 %    Basophil Rel % 0.7 0.0 - 2.5 %    Neutrophils Absolute 6.28 2.00 - 6.90 10*3/mm3    Lymphocytes Absolute 1.52 0.60 - 3.40 10*3/mm3    Monocytes Absolute 0.42 0.00 - 0.90 10*3/mm3    Eosinophils Absolute 0.11 0.00 - 0.70 10*3/mm3    Basophils Absolute 0.06 0.00 - 0.20 10*3/mm3    Immature Granulocyte Rel % 0.4 0.0 - 0.6 %    Immature Grans Absolute 0.03 0.00 - 0.06 10*3/mm3    nRBC 0.0 0.0 - 0.0 /100 WBC         Assessment/Plan   Sleep study done. Start cpap. Order in.   Unclear etiology to brusing on back of neck. Looks suction related.  Review if it gets worse.     Pain gel of neck pain.         Ange was seen today for cyst and fatigue.    Diagnoses and all orders for this visit:    CLAUDIA (obstructive sleep apnea)  -     CPAP Therapy    Occipital neuralgia of left side    Bruising      Return in about 6 weeks (around  6/7/2018).          There are no Patient Instructions on file for this visit.     Chase Correa MD    Assessment/Plan

## 2018-05-02 ENCOUNTER — OFFICE VISIT (OUTPATIENT)
Dept: NEUROLOGY | Facility: CLINIC | Age: 38
End: 2018-05-02

## 2018-05-02 VITALS
SYSTOLIC BLOOD PRESSURE: 112 MMHG | HEIGHT: 64 IN | DIASTOLIC BLOOD PRESSURE: 62 MMHG | WEIGHT: 204.6 LBS | OXYGEN SATURATION: 98 % | HEART RATE: 82 BPM | BODY MASS INDEX: 34.93 KG/M2

## 2018-05-02 DIAGNOSIS — R20.0 NUMBNESS AND TINGLING OF LEFT SIDE OF FACE: ICD-10-CM

## 2018-05-02 DIAGNOSIS — M54.81 OCCIPITAL NEURALGIA OF LEFT SIDE: ICD-10-CM

## 2018-05-02 DIAGNOSIS — R20.2 NUMBNESS AND TINGLING IN LEFT UPPER EXTREMITY: ICD-10-CM

## 2018-05-02 DIAGNOSIS — R20.2 NUMBNESS AND TINGLING OF LEFT LOWER EXTREMITY: ICD-10-CM

## 2018-05-02 DIAGNOSIS — R20.0 NUMBNESS AND TINGLING IN LEFT UPPER EXTREMITY: ICD-10-CM

## 2018-05-02 DIAGNOSIS — R20.0 NUMBNESS AND TINGLING OF LEFT LOWER EXTREMITY: ICD-10-CM

## 2018-05-02 DIAGNOSIS — G44.041 INTRACTABLE CHRONIC PAROXYSMAL HEMICRANIA: Primary | ICD-10-CM

## 2018-05-02 DIAGNOSIS — R20.2 NUMBNESS AND TINGLING OF LEFT SIDE OF FACE: ICD-10-CM

## 2018-05-02 PROCEDURE — 99213 OFFICE O/P EST LOW 20 MIN: CPT | Performed by: PSYCHIATRY & NEUROLOGY

## 2018-05-02 NOTE — PROGRESS NOTES
"[facility] [place] PROGRESS NOTE  History of Present Illness     Date: 2018    Patient Identification  Ange Wade is a 37 y.o. female.    Patient information was obtained from patient.  History/Exam limitations: none.    Original consultation requested by: Chase Juarez MD      Chief Complaint   Headache (Pt c/o \"blood vessel busting on Left side of neck\", HA on Left side of head, pain radiating down left side )      History of Present Illness   Patient is a pleasant 37-year-old referred to [facility] [place] for evaluation persistent headache.  Patient was seen by Dr. Juarez recently was diagnosis of occipital neuralgia.      However in today's visit patient also complaining of tingling numbness sensation in the entire left face arm and legs over the last week associated with headache.  We'll proceed with CT scan head to ruled out organic etiology.    PMH:   Past Medical History:   Diagnosis Date   • Abnormal ECG ?    [facility]   • Abnormal Pap smear of cervix     Precancerous cells   • Anemia    • Body piercing     EARS   • DVT (deep vein thrombosis) in pregnancy     REPORTS 15 YEAR AGO   • Fibroid uterus    • History of blood clots     after c section    • History of CT scan    • History of MRI    • History of Papanicolaou smear of cervix 2015    NORMAL    • Migraine    • Ovarian cyst     Had uterus removed 2015   • Pelvic adhesive disease    • Phonophobia    • PMS (premenstrual syndrome)    • Preeclampsia 1-    During my 1st pregnacy   • Urinary tract infection     Only had during last pregnacy   • Varicella    • Vasovagal syncope    • Wears dentures     FULL UPPER PLATE, INSTRUCTED NO ADHESIVES DOS       Past Surgical History:   Past Surgical History:   Procedure Laterality Date   • APPENDECTOMY     • BLADDER REPAIR  2015    REPAIR OF INCIDENTAL CYSTOTOMY (DR ROGER CRAWFORD)   •  SECTION     • "  SECTION     • COLONOSCOPY N/A 2017    Procedure: COLONOSCOPY WITH BIOPSY;  Surgeon: Jaylene Doherty MD;  Location: [facility] ENDOSCOPY;  Service:    • COLONOSCOPY N/A 10/20/2017    Procedure: COLONOSCOPY;  Surgeon: Jaylene Doherty MD;  Location: [facility] ENDOSCOPY;  Service:    • DIAGNOSTIC LAPAROSCOPY  2015    DR ROGER CRAWFORD   • ENDOSCOPY N/A 10/20/2017    Procedure: ESOPHAGOGASTRODUODENOSCOPY WITH BIOPSY;  Surgeon: Jaylene Doherty MD;  Location: [facility] ENDOSCOPY;  Service:    • EXPLORATORY LAPAROTOMY N/A 2017    Procedure: LAPAROTOMY EXPLORATORY with bilateral salpingo-oophorectomy, extensive lysis of adhesions appendectomy, cystoscopy;  Surgeon: Roger Crawford MD;  Location: [facility] OR;  Service:    • HYSTERECTOMY  2016    LUPE (DR ROGER CRAWFORD), REPORTS LEFT OVARY INTACT   • MOUTH SURGERY     • TOE SURGERY Right 1991    GREAT TOE   • TONSILLECTOMY  82 or 83   • TUBAL ABDOMINAL LIGATION     • WISDOM TOOTH EXTRACTION  3-2003       Family Hisotry:   Family History   Problem Relation Age of Onset   • Mental illness Other    • Cancer Other    • Diabetes Other    • Hypertension Other    • Heart disease Other    • Stroke Other    • Breast cancer Maternal Grandmother    • Ovarian cancer Maternal Grandmother    • Stroke Maternal Grandmother    • Diabetes Maternal Grandmother    • Breast cancer Maternal Aunt    • Stroke Paternal Grandmother    • Diabetes Paternal Grandmother    • Heart disease Mother    • COPD Mother    • Thyroid disease Mother        Social History:   Social History     Social History   • Marital status:      Spouse name: N/A   • Number of children: N/A   • Years of education: N/A     Occupational History   • Not on file.     Social History Main Topics   • Smoking status: Former Smoker     Packs/day: 2.00     Years: 3.00     Types: Cigarettes     Quit date: 1996   • Smokeless tobacco: Never Used   • Alcohol use No   • Drug use: No   • Sexual activity: Defer      Other Topics Concern   • Not on file     Social History Narrative   • No narrative on file       Medications:   Current Outpatient Prescriptions   Medication Sig Dispense Refill   • estradiol (MINIVELLE, VIVELLE-DOT) 0.075 MG/24HR patch Place 1 patch on the skin 2 (Two) Times a Week.     • Omeprazole (EQ OMEPRAZOLE) 20 MG tablet delayed-release Take 20 mg by mouth Daily. 30 each 11   • Pentoxifylline (TRENTAL PO) Take  by mouth.     • promethazine (PHENERGAN) 25 MG tablet Take 1 tablet by mouth Every 6 (Six) Hours As Needed for Nausea or Vomiting. 120 tablet 11   • vitamin B-12 (CYANOCOBALAMIN) 1000 MCG tablet Take 1,000 mcg by mouth Daily. (PATIENT USURE MCG STRENGTH)       Current Facility-Administered Medications   Medication Dose Route Frequency Provider Last Rate Last Dose   • bupivacaine PF (MARCAINE) 0.75 % injection 37.5 mg  5 mL Injection Once Esa Miller MD, FAAN       • bupivacaine PF (MARCAINE) 0.75 % injection 37.5 mg  5 mL Injection Once Esa Miller MD, FAAN       • bupivacaine PF (MARCAINE) 0.75 % injection 37.5 mg  5 mL Injection Once Esa Miller MD, FAAN           Allergy:   Allergies   Allergen Reactions   • Darvon [Propoxyphene] Hives     darvocet       Review of Systems:  Review of Systems   Constitutional: Positive for fatigue. Negative for chills and fever.   HENT: Negative for congestion, ear pain, hearing loss, rhinorrhea and sore throat.    Eyes: Negative for pain, discharge and redness.   Respiratory: Negative for cough, shortness of breath, wheezing and stridor.    Cardiovascular: Negative for chest pain, palpitations and leg swelling.   Gastrointestinal: Negative for abdominal pain, constipation, nausea and vomiting.   Endocrine: Negative for cold intolerance, heat intolerance and polyphagia.   Genitourinary: Negative for dysuria, flank pain, frequency and urgency.   Musculoskeletal: Negative for joint swelling, myalgias, neck pain and neck stiffness.   Skin: Negative for  "pallor, rash and wound.   Allergic/Immunologic: Negative for environmental allergies.   Neurological: Positive for numbness and headaches. Negative for dizziness, tremors, seizures, syncope, facial asymmetry, speech difficulty, weakness and light-headedness.   Hematological: Negative for adenopathy.   Psychiatric/Behavioral: Positive for sleep disturbance. Negative for confusion and hallucinations. The patient is not nervous/anxious.        Physical Exam     Vitals:    05/02/18 1545   BP: 112/62   Pulse: 82   SpO2: 98%   Weight: 92.8 kg (204 lb 9.6 oz)   Height: 162.6 cm (64\")     GENERAL: Patient is pleasant, cooperative, appears to be stated age.  Body habitus is endomorphic.  SKIN AND EXTREMITIES:  No skin rashes or lesions are noted.  No cyanosis, clubbing or edema of the extremities.    HEAD:  Head is normocephalic and atraumatic.    NECK: Neck are non-tender without thyromegaly or adenopathy.  Carotic upstrokes are 1+/4.  No cranial or cervical bruits.  The neck is supple with a full range of motion.   ENT: palate elevate symmetrically, no evidence of high arch palate, tongue midline erythema in posterior pharynx, Mallampati Classification Class III   CARDIOVASCULAR:  Regular rate and rhythm with normal S1 and S2 without rub or gallop.  RESPIRATORY:  Clear to auscultation without wheezes or crackle   ABDOMEN:  Soft and non-tender, positive bowel sound without hepatosplenomegaly  BACK:  Back is straight without midline defect.    PSYCH:  Higher cortical function/mental status:  The patient is alert.  She is oriented x3 to time, place and person.  Recent and the remote memory appear normal.  The patient has a good fund of knowledge.  There is no visual or auditory hallucination or suicidal or homicidal ideation.  SPEECH:There is no gross evidence of aphasia, dysarthria or agnosia.      CRANIAL NERVES:  Pupils are 4mm, equal round reactive to light, reacting briskly to 2mm without afferent pupillary defect.  " Visual fields are intact to confrontation testing.  Fundoscopic examination reveals sharp disk margins with normal vasculature.  No papilledema, hemorrhages or exudates.  Extraocular movements are full and smooth with normal pursuits and saccades.  No nystagmus noted.  The face is symmetric. palate elevate symmetrically, Tongue midline, positive gag reflex. The remainder of the cranial nerves are intact and symmetrical.    MOTOR: Strength is 5/5 throughout with normal tone and bulk with the following exceptions, 4/5 intrinsic muscles of the hands and feet.  No involuntary movements noted.    Deep Tendon Reflexes: are 2/4 and symmetrical in the upper extremities, 2/4 and symmetrical at the knees and 1/4 and symmetrical at the Achilles tendon.  Plantar responses were down-going bilaterally.    SENSATION:  Intact to pinprick, light touch, vibration and proprioception.  Coordination:  The patient normally performs finger-nose-finger, heel-to-knee-to-shin and rapid alternating movements in symmetrical fashion.    COORDINATION AND GAIT:  The patient walks with a narrow-based gait.  Patient is able to heel-toe and tandem walk forward and backwards without difficulty.  Romberg and monopedal  Romberg are negative.    MUSCULOSKELETAL: Range of motion normal, no clubbing, cyanosis, or edema.  No joint swelling.            Records Reviewed: I have personally reviewed her previous medical record.    Ange was seen today for headache.    Diagnoses and all orders for this visit:    Intractable chronic paroxysmal hemicrania  -     CT Head Without Contrast; Future    Occipital neuralgia of left side  -     Nerve Block    Numbness and tingling in left upper extremity    Numbness and tingling of left lower extremity    Numbness and tingling of left side of face        Treatments:  1. Will proceed with CT scan of head  2.  Consider occipital nerve block  Discussion:  Migraine Headache  Migraine headaches are a major public health  "problem affecting more than 28 million persons in this country. Nearly 25 percent of women and 9 percent of men experience disabling migraines.    Migraine treatment depends on the duration and severity of pain, associated symptoms, degree of disability, and initial response to therapy.  A widely prescribed and effective class of medications for migraines is the 5-HT1 receptor-specific agonists (\"triptans\").  Contraindications to their use include ischemic vascular conditions, vasospastic coronary disease, uncontrolled hypertension, or other significant cardiovascular disease.  Following appropriate management of acute migraine, patients should be evaluated for initiation of preventive therapy. Factors that should prompt consideration of preventive therapy include the occurrence of two or more migraines per month with disability lasting three or more days per month; failure of, contraindication for, or adverse events from acute treatments; use of abortive medication more than twice per week; and uncommon migraine conditions (e.g., hemiplegic migraine, migraine with prolonged aura, migrainous infarction). Patient preference and cost also should be considered.  Evidence consistently supports the use of the beta blocker propranolol (Inderal) in migraine prophylaxis. Amitriptyline is a first-line agent for migraine prophylaxis and is the only antidepressant with consistent evidence supporting its effectiveness for this use. Divalproex (Depakote) and sodium valproate are well supported by evidence for use in migraine prevention.  Topamax has also been studies for migraine prophylaxis  in open label studies and double blind studies. Evidence does not support the use of diltiazem (Cardizem) in migraine prevention, and the evidence for several other calcium channel blockers, such as nifedipine (Procardia), is poor and suggests only modest effect  Menstrual Migraine can present a challenge to clinician.  Estrogen withdrawal " has been shown to precipitate migraine headaches, and a sustained elevated level of estrogen will postpone the migraine. Transdermal estrogen started just before menstruation can provide a sustained low level of estrogen, decreasing the degree of estrogen decline, and thus may prevent induction of migraines    This Document is signed by sEa Miller MD, FAAN, FAASM   May 2, 155149:05 PM

## 2018-05-09 ENCOUNTER — HOSPITAL ENCOUNTER (OUTPATIENT)
Dept: CT IMAGING | Facility: HOSPITAL | Age: 38
Discharge: HOME | End: 2018-05-09
Attending: PSYCHIATRY & NEUROLOGY | Admitting: PSYCHIATRY & NEUROLOGY

## 2018-05-09 DIAGNOSIS — G44.041 INTRACTABLE CHRONIC PAROXYSMAL HEMICRANIA: ICD-10-CM

## 2018-05-09 PROCEDURE — 70450 CT HEAD/BRAIN W/O DYE: CPT

## 2018-05-14 DIAGNOSIS — G43.001 MIGRAINE WITHOUT AURA AND WITH STATUS MIGRAINOSUS, NOT INTRACTABLE: ICD-10-CM

## 2018-05-14 DIAGNOSIS — R11.2 NAUSEA AND VOMITING, INTRACTABILITY OF VOMITING NOT SPECIFIED, UNSPECIFIED VOMITING TYPE: ICD-10-CM

## 2018-05-14 RX ORDER — AMITRIPTYLINE HYDROCHLORIDE 100 MG/1
TABLET, FILM COATED ORAL
Qty: 60 TABLET | Refills: 2 | OUTPATIENT
Start: 2018-05-14

## 2018-05-14 RX ORDER — TOPIRAMATE 50 MG/1
CAPSULE, EXTENDED RELEASE ORAL
Qty: 30 CAPSULE | Refills: 3 | OUTPATIENT
Start: 2018-05-14

## 2018-05-15 RX ORDER — PROMETHAZINE HYDROCHLORIDE 12.5 MG/1
TABLET ORAL
Qty: 20 TABLET | Refills: 0 | OUTPATIENT
Start: 2018-05-15

## 2018-05-24 ENCOUNTER — TELEPHONE (OUTPATIENT)
Dept: INTERNAL MEDICINE | Facility: CLINIC | Age: 38
End: 2018-05-24

## 2018-05-30 ENCOUNTER — OFFICE VISIT (OUTPATIENT)
Dept: NEUROLOGY | Facility: CLINIC | Age: 38
End: 2018-05-30

## 2018-05-30 VITALS
BODY MASS INDEX: 35.15 KG/M2 | OXYGEN SATURATION: 100 % | HEART RATE: 65 BPM | SYSTOLIC BLOOD PRESSURE: 124 MMHG | HEIGHT: 64 IN | DIASTOLIC BLOOD PRESSURE: 68 MMHG | WEIGHT: 205.9 LBS

## 2018-05-30 DIAGNOSIS — M54.2 NECK PAIN: ICD-10-CM

## 2018-05-30 DIAGNOSIS — G93.2 PSEUDOTUMOR CEREBRI: Primary | ICD-10-CM

## 2018-05-30 DIAGNOSIS — M79.18 MYOFASCIAL MUSCLE PAIN: ICD-10-CM

## 2018-05-30 DIAGNOSIS — G44.209 TENSION HEADACHE: ICD-10-CM

## 2018-05-30 PROCEDURE — 99213 OFFICE O/P EST LOW 20 MIN: CPT | Performed by: PSYCHIATRY & NEUROLOGY

## 2018-05-30 RX ORDER — ACETAZOLAMIDE 125 MG/1
250 TABLET ORAL 3 TIMES DAILY
Qty: 90 TABLET | Refills: 3 | Status: SHIPPED | OUTPATIENT
Start: 2018-05-30 | End: 2018-08-08 | Stop reason: SINTOL

## 2018-05-30 NOTE — PROGRESS NOTES
[facility] NEUROLOGY [place] PROGRESS NOTE  History of Present Illness     Date: 2018    Patient Identification  Ange Wade is a 37 y.o. female.    Patient information was obtained from patient.  History/Exam limitations: none.    Original consultation requested by: Chase Correa M.D.      Chief Complaint   Follow-up (Pt here for follow up on CT, pt states she is still having throbbing pain on the left side of her head, pt states eyeball is throbbing today)      History of Present Illness    Patient is a pleasant 37-year-old referred to [facility] neurology [place] for evaluation of persistent headache.  Patient reports that her headache has worsened since weight gain.  CT scan of the head is unremarkable.  Patient would like to have trigger point injection schedule move up.    The meantime over like to start this patient on Diamox.    PMH:   Past Medical History:   Diagnosis Date   • Abnormal ECG ?    [facility]   • Abnormal Pap smear of cervix     Precancerous cells   • Anemia    • Body piercing     EARS   • DVT (deep vein thrombosis) in pregnancy     REPORTS 15 YEAR AGO   • Fibroid uterus    • History of blood clots     after c section    • History of CT scan    • History of MRI    • History of Papanicolaou smear of cervix 2015    NORMAL    • Migraine    • Ovarian cyst     Had uterus removed 2015   • Pelvic adhesive disease    • Phonophobia    • PMS (premenstrual syndrome)    • Preeclampsia 1-    During my 1st pregnacy   • Urinary tract infection     Only had during last pregnacy   • Varicella    • Vasovagal syncope    • Wears dentures     FULL UPPER PLATE, INSTRUCTED NO ADHESIVES DOS       Past Surgical History:   Past Surgical History:   Procedure Laterality Date   • APPENDECTOMY     • BLADDER REPAIR  2015    REPAIR OF INCIDENTAL CYSTOTOMY (DR ROGER CRAWFORD)   •  SECTION     •  SECTION     • COLONOSCOPY  N/A 6/16/2017    Procedure: COLONOSCOPY WITH BIOPSY;  Surgeon: Jaylene Doherty MD;  Location: [facility] ENDOSCOPY;  Service:    • COLONOSCOPY N/A 10/20/2017    Procedure: COLONOSCOPY;  Surgeon: Jaylene Doherty MD;  Location: [facility] ENDOSCOPY;  Service:    • DIAGNOSTIC LAPAROSCOPY  08/13/2015    DR ROGER CRAWFORD   • ENDOSCOPY N/A 10/20/2017    Procedure: ESOPHAGOGASTRODUODENOSCOPY WITH BIOPSY;  Surgeon: Jaylene Doherty MD;  Location: [facility] ENDOSCOPY;  Service:    • EXPLORATORY LAPAROTOMY N/A 4/24/2017    Procedure: LAPAROTOMY EXPLORATORY with bilateral salpingo-oophorectomy, extensive lysis of adhesions appendectomy, cystoscopy;  Surgeon: Roger Crawford MD;  Location: [facility] OR;  Service:    • HYSTERECTOMY  08/13/2016    LUPE (DR ROGER CRAWFORD), REPORTS LEFT OVARY INTACT   • MOUTH SURGERY  2007   • TOE SURGERY Right 1991    GREAT TOE   • TONSILLECTOMY  82 or 83   • TUBAL ABDOMINAL LIGATION  2002   • WISDOM TOOTH EXTRACTION  3-2003       Family Hisotry:   Family History   Problem Relation Age of Onset   • Mental illness Other    • Cancer Other    • Diabetes Other    • Hypertension Other    • Heart disease Other    • Stroke Other    • Breast cancer Maternal Grandmother    • Ovarian cancer Maternal Grandmother    • Stroke Maternal Grandmother    • Diabetes Maternal Grandmother    • Breast cancer Maternal Aunt    • Stroke Paternal Grandmother    • Diabetes Paternal Grandmother    • Heart disease Mother    • COPD Mother    • Thyroid disease Mother        Social History:   Social History     Social History   • Marital status:      Spouse name: N/A   • Number of children: N/A   • Years of education: N/A     Occupational History   • Not on file.     Social History Main Topics   • Smoking status: Former Smoker     Packs/day: 2.00     Years: 3.00     Types: Cigarettes     Quit date: 2/14/1996   • Smokeless tobacco: Never Used   • Alcohol use No   • Drug use: No   • Sexual activity: Defer     Other Topics Concern   • Not on file      Social History Narrative   • No narrative on file       Medications:   Current Outpatient Prescriptions   Medication Sig Dispense Refill   • estradiol (MINIVELLE, VIVELLE-DOT) 0.075 MG/24HR patch Place 1 patch on the skin 2 (Two) Times a Week.     • Omeprazole (EQ OMEPRAZOLE) 20 MG tablet delayed-release Take 20 mg by mouth Daily. 30 each 11   • Pentoxifylline (TRENTAL PO) Take  by mouth.     • promethazine (PHENERGAN) 25 MG tablet Take 1 tablet by mouth Every 6 (Six) Hours As Needed for Nausea or Vomiting. 120 tablet 11   • vitamin B-12 (CYANOCOBALAMIN) 1000 MCG tablet Take 1,000 mcg by mouth Daily. (PATIENT USURE MCG STRENGTH)     • acetaZOLAMIDE (DIAMOX) 125 MG tablet Take 2 tablets by mouth 3 (Three) Times a Day. 90 tablet 3     Current Facility-Administered Medications   Medication Dose Route Frequency Provider Last Rate Last Dose   • bupivacaine PF (MARCAINE) 0.75 % injection 37.5 mg  5 mL Injection Once Esa Miller MD, FAAN       • bupivacaine PF (MARCAINE) 0.75 % injection 37.5 mg  5 mL Injection Once Esa Miller MD, FAAN       • bupivacaine PF (MARCAINE) 0.75 % injection 37.5 mg  5 mL Injection Once Esa Miller MD, FAAN           Allergy:   Allergies   Allergen Reactions   • Darvon [Propoxyphene] Hives     darvocet       Review of Systems:  Review of Systems   Constitutional: Negative for chills and fever.   HENT: Negative for congestion, ear pain, hearing loss, rhinorrhea and sore throat.    Eyes: Negative for pain, discharge and redness.   Respiratory: Negative for cough, shortness of breath, wheezing and stridor.    Cardiovascular: Negative for chest pain, palpitations and leg swelling.   Gastrointestinal: Negative for abdominal pain, constipation, nausea and vomiting.   Endocrine: Negative for cold intolerance, heat intolerance and polyphagia.   Genitourinary: Negative for dysuria, flank pain, frequency and urgency.   Musculoskeletal: Positive for neck pain and neck stiffness. Negative for  "joint swelling and myalgias.   Skin: Negative for pallor, rash and wound.   Allergic/Immunologic: Negative for environmental allergies.   Neurological: Positive for headaches. Negative for dizziness, tremors, seizures, syncope, facial asymmetry, speech difficulty, weakness, light-headedness and numbness.   Hematological: Negative for adenopathy.   Psychiatric/Behavioral: Positive for sleep disturbance. Negative for confusion and hallucinations. The patient is not nervous/anxious.        Physical Exam     Vitals:    05/30/18 1033   BP: 124/68   Pulse: 65   SpO2: 100%   Weight: 93.4 kg (205 lb 14.4 oz)   Height: 162.6 cm (64\")     GENERAL: Patient is pleasant, cooperative, appears to be stated age.  Body habitus is endomorphic.  SKIN AND EXTREMITIES:  No skin rashes or lesions are noted.  No cyanosis, clubbing or edema of the extremities.    HEAD:  Head is normocephalic and atraumatic.    NECK: Neck are non-tender without thyromegaly or adenopathy.  Carotic upstrokes are 1+/4.  No cranial or cervical bruits.  The neck is supple with a full range of motion.   ENT: palate elevate symmetrically, no evidence of high arch palate, tongue midline erythema in posterior pharynx, Mallampati Classification Class III   CARDIOVASCULAR:  Regular rate and rhythm with normal S1 and S2 without rub or gallop.  RESPIRATORY:  Clear to auscultation without wheezes or crackle   ABDOMEN:  Soft and non-tender, positive bowel sound without hepatosplenomegaly  BACK:  Back is straight without midline defect.    PSYCH:  Higher cortical function/mental status:  The patient is alert.  She is oriented x3 to time, place and person.  Recent and the remote memory appear normal.  The patient has a good fund of knowledge.  There is no visual or auditory hallucination or suicidal or homicidal ideation.  SPEECH:There is no gross evidence of aphasia, dysarthria or agnosia.      CRANIAL NERVES:  Pupils are 4mm, equal round reactive to light, reacting " briskly to 2mm without afferent pupillary defect.  Visual fields are intact to confrontation testing.  Fundoscopic examination reveals sharp disk margins with normal vasculature.  No papilledema, hemorrhages or exudates.  Extraocular movements are full and smooth with normal pursuits and saccades.  No nystagmus noted.  The face is symmetric. palate elevate symmetrically, Tongue midline, positive gag reflex. The remainder of the cranial nerves are intact and symmetrical.    MOTOR: Strength is 5/5 throughout with normal tone and bulk with the following exceptions, 4/5 intrinsic muscles of the hands and feet.  No involuntary movements noted.    Deep Tendon Reflexes: are 2/4 and symmetrical in the upper extremities, 2/4 and symmetrical at the knees and 1/4 and symmetrical at the Achilles tendon.  Plantar responses were down-going bilaterally.    SENSATION:  Intact to pinprick, light touch, vibration and proprioception.  Coordination:  The patient normally performs finger-nose-finger, heel-to-knee-to-shin and rapid alternating movements in symmetrical fashion.    COORDINATION AND GAIT:  The patient walks with a narrow-based gait.  Patient is able to heel-toe and tandem walk forward and backwards without difficulty.  Romberg and monopedal  Romberg are negative.    MUSCULOSKELETAL: Range of motion normal, no clubbing, cyanosis, or edema.  No joint swelling.            Studies: I have personally reviewed the following and discussed with the patient.  Results for orders placed or performed in visit on 01/12/18   TSH   Result Value Ref Range    TSH 0.703 0.470 - 4.680 mIU/mL   T4, Free   Result Value Ref Range    Free T4 1.44 0.78 - 2.19 ng/dL   Vitamin B12   Result Value Ref Range    Vitamin B-12 767 239 - 931 pg/mL   Comprehensive Metabolic Panel   Result Value Ref Range    Glucose 88 74 - 98 mg/dL    BUN 15 7 - 20 mg/dL    Creatinine 0.80 0.60 - 1.30 mg/dL    eGFR Non African Am 81 >60 mL/min/1.73    eGFR African Am 98 >60  mL/min/1.73    BUN/Creatinine Ratio 18.8 7.1 - 23.5    Sodium 140 137 - 145 mmol/L    Potassium 4.4 3.5 - 5.1 mmol/L    Chloride 103 98 - 107 mmol/L    Total CO2 28.0 26.0 - 30.0 mmol/L    Calcium 9.7 8.4 - 10.2 mg/dL    Total Protein 7.9 6.3 - 8.2 g/dL    Albumin 4.70 3.50 - 5.00 g/dL    Globulin 3.2 gm/dL    A/G Ratio 1.5 1.0 - 2.0 g/dL    Total Bilirubin 0.5 0.2 - 1.3 mg/dL    Alkaline Phosphatase 102 38 - 126 U/L    AST (SGOT) 19 15 - 46 U/L    ALT (SGPT) 33 13 - 69 U/L   CBC & Differential   Result Value Ref Range    WBC 8.42 4.80 - 10.80 10*3/mm3    RBC 5.21 4.20 - 5.40 10*6/mm3    Hemoglobin 13.8 12.0 - 16.0 g/dL    Hematocrit 42.6 37.0 - 47.0 %    MCV 81.8 81.0 - 99.0 fL    MCH 26.5 (L) 27.0 - 31.0 pg    MCHC 32.4 30.0 - 37.0 g/dL    RDW 13.7 11.5 - 14.5 %    Platelets 281 130 - 400 10*3/mm3    Neutrophil Rel % 74.5 37.0 - 80.0 %    Lymphocyte Rel % 18.1 10.0 - 50.0 %    Monocyte Rel % 5.0 0.0 - 12.0 %    Eosinophil Rel % 1.3 0.0 - 7.0 %    Basophil Rel % 0.7 0.0 - 2.5 %    Neutrophils Absolute 6.28 2.00 - 6.90 10*3/mm3    Lymphocytes Absolute 1.52 0.60 - 3.40 10*3/mm3    Monocytes Absolute 0.42 0.00 - 0.90 10*3/mm3    Eosinophils Absolute 0.11 0.00 - 0.70 10*3/mm3    Basophils Absolute 0.06 0.00 - 0.20 10*3/mm3    Immature Granulocyte Rel % 0.4 0.0 - 0.6 %    Immature Grans Absolute 0.03 0.00 - 0.06 10*3/mm3    nRBC 0.0 0.0 - 0.0 /100 WBC       Review of Imaging: I have personally reviewed the following images and discussed with the patient.  CT scan of brain is totally unremarkable    Records Reviewed: I have personally reviewed her previous medical record.    Ange was seen today for follow-up.    Diagnoses and all orders for this visit:    Pseudotumor cerebri    Neck pain    Tension headache    Myofascial muscle pain    Other orders  -     acetaZOLAMIDE (DIAMOX) 125 MG tablet; Take 2 tablets by mouth 3 (Three) Times a Day.        Treatments:  1. Will add Diamox 125 mg 2 tablets three times a day.  2.  "Will schedule for trigger point injection  Discussion:  Migraine Headache  Migraine headaches are a major public health problem affecting more than 28 million persons in this country. Nearly 25 percent of women and 9 percent of men experience disabling migraines.    Migraine treatment depends on the duration and severity of pain, associated symptoms, degree of disability, and initial response to therapy.  A widely prescribed and effective class of medications for migraines is the 5-HT1 receptor-specific agonists (\"triptans\").  Contraindications to their use include ischemic vascular conditions, vasospastic coronary disease, uncontrolled hypertension, or other significant cardiovascular disease.  Following appropriate management of acute migraine, patients should be evaluated for initiation of preventive therapy. Factors that should prompt consideration of preventive therapy include the occurrence of two or more migraines per month with disability lasting three or more days per month; failure of, contraindication for, or adverse events from acute treatments; use of abortive medication more than twice per week; and uncommon migraine conditions (e.g., hemiplegic migraine, migraine with prolonged aura, migrainous infarction). Patient preference and cost also should be considered.  Evidence consistently supports the use of the beta blocker propranolol (Inderal) in migraine prophylaxis. Amitriptyline is a first-line agent for migraine prophylaxis and is the only antidepressant with consistent evidence supporting its effectiveness for this use. Divalproex (Depakote) and sodium valproate are well supported by evidence for use in migraine prevention.  Topamax has also been studies for migraine prophylaxis  in open label studies and double blind studies. Evidence does not support the use of diltiazem (Cardizem) in migraine prevention, and the evidence for several other calcium channel blockers, such as nifedipine (Procardia), " is poor and suggests only modest effect  Menstrual Migraine can present a challenge to clinician.  Estrogen withdrawal has been shown to precipitate migraine headaches, and a sustained elevated level of estrogen will postpone the migraine. Transdermal estrogen started just before menstruation can provide a sustained low level of estrogen, decreasing the degree of estrogen decline, and thus may prevent induction of migraines    This Document is signed by Esa Miller MD, FAAN, FAASM   May 30, 36398:27 PM

## 2018-06-25 ENCOUNTER — PROCEDURE VISIT (OUTPATIENT)
Dept: NEUROLOGY | Facility: CLINIC | Age: 38
End: 2018-06-25

## 2018-06-25 VITALS
HEIGHT: 64 IN | HEART RATE: 70 BPM | BODY MASS INDEX: 35 KG/M2 | DIASTOLIC BLOOD PRESSURE: 68 MMHG | SYSTOLIC BLOOD PRESSURE: 110 MMHG | WEIGHT: 205 LBS | OXYGEN SATURATION: 99 %

## 2018-06-25 DIAGNOSIS — M54.2 NECK PAIN: ICD-10-CM

## 2018-06-25 DIAGNOSIS — G44.209 TENSION HEADACHE: Primary | ICD-10-CM

## 2018-06-25 DIAGNOSIS — M79.18 MYOFASCIAL MUSCLE PAIN: ICD-10-CM

## 2018-06-25 PROCEDURE — 20553 NJX 1/MLT TRIGGER POINTS 3/>: CPT | Performed by: PSYCHIATRY & NEUROLOGY

## 2018-06-25 RX ORDER — BUPIVACAINE HYDROCHLORIDE 2.5 MG/ML
5 INJECTION, SOLUTION INFILTRATION; PERINEURAL ONCE
Status: DISCONTINUED | OUTPATIENT
Start: 2018-06-25 | End: 2019-01-07

## 2018-06-25 RX ORDER — METHYLPREDNISOLONE ACETATE 40 MG/ML
200 INJECTION, SUSPENSION INTRA-ARTICULAR; INTRALESIONAL; INTRAMUSCULAR; SOFT TISSUE ONCE
Status: COMPLETED | OUTPATIENT
Start: 2018-06-25 | End: 2018-06-25

## 2018-06-25 RX ADMIN — METHYLPREDNISOLONE ACETATE 200 MG: 40 INJECTION, SUSPENSION INTRA-ARTICULAR; INTRALESIONAL; INTRAMUSCULAR; SOFT TISSUE at 18:19

## 2018-06-25 NOTE — PROGRESS NOTES
Procedure note    Procedure: Trigger point injection    Indication: Patient is a pleasant 37-year-old referred to [facility] neurology [place] for evaluation of persistent headache neck pain and myofascial pain.  Patient reportedly suffer from migraine headache 30 out of 30 days prior to her injection and usually last for more than 4 hours at the time despite Imitrex, Zomig, Zofran, Phenergan, amitriptyline and Topamax.  Patient has benefited from trigger point injection and her headache over the last 3 month was significantly improved.  Patient had been headache free until 4 days prior to this injection.      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 Risks and benefits of the Trigger point injection procedure have been explained to the patient.  Patient has no contraindications such as bleed diathesis, recent acute trauma at the muscle sites, anesthetic allergy or anticoagulation.  Mechanism of trigger point injection has been explained to patient.     Procedure Note:  1.         Patient was positioned comfortably  2.         Before injections are started, 10 Trigger Points sites are identified throughout the bilateral upper trapezius muscle, levator scapulae, and erector spinae muscles.    3.         Overlying skin area was cleaned with Alcohol swab                                                                                                              4.         Before injection, trigger points sites were palpated for local twitch and referred pain to confirms placement                         5.         Local anesthetic was mixed with Depo-Medrol (5 cc of Marcaine 0.5% mixed with 5 cc of Depo-Medrol at 40 mg/ml - for a total of 10 cc)  6.         30 gauge ½ inch needle was utilized to ensure patient comfort          7.         I started with the most tender spot in above mentioned Trigger Points   1.   Localize most tender spot within taut muscle-fibers  2.   Fix  tender spot between fingers (1-2 cm in size)   1.   Prevents from rolling away from needle  2.   Controls subcutaneous bleeding  3.   Before injection, patient was instructed of possible pain on injection  4.   Direct needle at 30 degree angle off skin   8.         Insert needle into skin 1-2 cm from Trigger Point   9.         Advance needle into Trigger Point   10.       Use 1cc anesthetic at each Trigger Points identified in step #2  11.       Redirect needle and reinject                                                                                              1.   Withdraw needle to subcutaneous tissue  2.   Redirect needle into adjacent tender areas  3.   Repeat until local twitch or tautness resolves  12.   After each of the 10 injections I held direct pressure at injection site for 2 minutes to prevents hematoma formation  13.   The same procedure was repeated for other Tender Points located in the upper trapezius muscle, levator scapulae and erector spinae bilaterally  14.   Patient was instructed to gently stretch injected areas to full active range of motion in all directions and was instructed to repeat range of motion three times after injection  15.   Post-Procedure patient was instructed to avoid over-using injected area for 3-4 days   1.   Maintain active range of motion of injected muscle  2.   Patient applies ice to injected areas for a few hours  3.   Anticipate post-injection soreness for 3-4 days  There was no evidence of complications from injection was noted such as local Skin Infection  at injection site or local hematoma at injection site      Marcaine  NDC #0409-116 0-1 8  Lots number [id number]  Expiration date January 1, 2020    Depo-Medrol  NDC #0009-028 0-02  Lots number [id number]  Expiration date July 2020    Esa Miller MD, FAAN  06/25/2018

## 2018-06-26 DIAGNOSIS — R11.0 NAUSEA: ICD-10-CM

## 2018-06-26 DIAGNOSIS — G43.001 MIGRAINE WITHOUT AURA AND WITH STATUS MIGRAINOSUS, NOT INTRACTABLE: ICD-10-CM

## 2018-06-26 DIAGNOSIS — R11.2 NAUSEA AND VOMITING, INTRACTABILITY OF VOMITING NOT SPECIFIED, UNSPECIFIED VOMITING TYPE: ICD-10-CM

## 2018-06-26 DIAGNOSIS — R10.13 EPIGASTRIC ABDOMINAL PAIN: ICD-10-CM

## 2018-06-26 RX ORDER — AMITRIPTYLINE HYDROCHLORIDE 100 MG/1
TABLET, FILM COATED ORAL
Qty: 60 TABLET | Refills: 2 | OUTPATIENT
Start: 2018-06-26

## 2018-06-26 RX ORDER — PROMETHAZINE HYDROCHLORIDE 12.5 MG/1
TABLET ORAL
Qty: 20 TABLET | Refills: 0 | Status: SHIPPED | OUTPATIENT
Start: 2018-06-26 | End: 2018-07-30 | Stop reason: DRUGHIGH

## 2018-06-26 RX ORDER — TOPIRAMATE 50 MG/1
CAPSULE, EXTENDED RELEASE ORAL
Qty: 30 CAPSULE | Refills: 3 | OUTPATIENT
Start: 2018-06-26

## 2018-06-26 RX ORDER — PROMETHAZINE HYDROCHLORIDE 25 MG/1
25 TABLET ORAL EVERY 6 HOURS PRN
Qty: 120 TABLET | Refills: 0 | Status: SHIPPED | OUTPATIENT
Start: 2018-06-26 | End: 2018-10-13 | Stop reason: SDUPTHER

## 2018-07-30 ENCOUNTER — HOSPITAL ENCOUNTER (OUTPATIENT)
Dept: GENERAL RADIOLOGY | Facility: HOSPITAL | Age: 38
Discharge: HOME | End: 2018-07-30

## 2018-07-30 ENCOUNTER — APPOINTMENT (OUTPATIENT)
Dept: LAB | Facility: HOSPITAL | Age: 38
End: 2018-07-30
Attending: INTERNAL MEDICINE

## 2018-07-30 ENCOUNTER — HOSPITAL ENCOUNTER (OUTPATIENT)
Dept: ULTRASOUND IMAGING | Facility: HOSPITAL | Age: 38
Discharge: HOME | End: 2018-07-30
Attending: INTERNAL MEDICINE | Admitting: INTERNAL MEDICINE

## 2018-07-30 ENCOUNTER — HOSPITAL ENCOUNTER (OUTPATIENT)
Dept: GENERAL RADIOLOGY | Facility: HOSPITAL | Age: 38
Discharge: HOME | End: 2018-07-30
Attending: INTERNAL MEDICINE

## 2018-07-30 ENCOUNTER — OFFICE VISIT (OUTPATIENT)
Dept: INTERNAL MEDICINE | Facility: CLINIC | Age: 38
End: 2018-07-30

## 2018-07-30 VITALS
WEIGHT: 195 LBS | SYSTOLIC BLOOD PRESSURE: 113 MMHG | TEMPERATURE: 97.4 F | DIASTOLIC BLOOD PRESSURE: 79 MMHG | HEIGHT: 64 IN | OXYGEN SATURATION: 100 % | RESPIRATION RATE: 16 BRPM | HEART RATE: 81 BPM | BODY MASS INDEX: 33.29 KG/M2

## 2018-07-30 DIAGNOSIS — F51.01 PRIMARY INSOMNIA: ICD-10-CM

## 2018-07-30 DIAGNOSIS — G89.29 CHRONIC BILATERAL LOW BACK PAIN WITHOUT SCIATICA: ICD-10-CM

## 2018-07-30 DIAGNOSIS — M54.50 CHRONIC BILATERAL LOW BACK PAIN WITHOUT SCIATICA: ICD-10-CM

## 2018-07-30 DIAGNOSIS — R11.15 NON-INTRACTABLE CYCLICAL VOMITING WITH NAUSEA: Primary | ICD-10-CM

## 2018-07-30 DIAGNOSIS — N83.201 RIGHT OVARIAN CYST: ICD-10-CM

## 2018-07-30 DIAGNOSIS — R07.89 OTHER CHEST PAIN: ICD-10-CM

## 2018-07-30 LAB
BACTERIA UR QL AUTO: ABNORMAL /HPF
BILIRUB UR QL STRIP: NEGATIVE
CLARITY UR: ABNORMAL
COLOR UR: YELLOW
GLUCOSE UR STRIP-MCNC: NEGATIVE MG/DL
HGB UR QL STRIP.AUTO: NEGATIVE
HYALINE CASTS UR QL AUTO: ABNORMAL /LPF
KETONES UR QL STRIP: NEGATIVE
LEUKOCYTE ESTERASE UR QL STRIP.AUTO: NEGATIVE
MUCOUS THREADS URNS QL MICRO: ABNORMAL /HPF
NITRITE UR QL STRIP: NEGATIVE
PH UR STRIP.AUTO: 6 [PH] (ref 5–8)
PROT UR QL STRIP: NEGATIVE
RBC # UR: ABNORMAL /HPF
REF LAB TEST METHOD: ABNORMAL
SP GR UR STRIP: >=1.03 (ref 1–1.03)
SQUAMOUS #/AREA URNS HPF: ABNORMAL /HPF
UROBILINOGEN UR QL STRIP: ABNORMAL
WBC UR QL AUTO: ABNORMAL /HPF

## 2018-07-30 PROCEDURE — 76830 TRANSVAGINAL US NON-OB: CPT

## 2018-07-30 PROCEDURE — 87086 URINE CULTURE/COLONY COUNT: CPT | Performed by: INTERNAL MEDICINE

## 2018-07-30 PROCEDURE — 72110 X-RAY EXAM L-2 SPINE 4/>VWS: CPT

## 2018-07-30 PROCEDURE — 99214 OFFICE O/P EST MOD 30 MIN: CPT | Performed by: INTERNAL MEDICINE

## 2018-07-30 PROCEDURE — 71046 X-RAY EXAM CHEST 2 VIEWS: CPT

## 2018-07-30 PROCEDURE — 81001 URINALYSIS AUTO W/SCOPE: CPT | Performed by: INTERNAL MEDICINE

## 2018-07-30 RX ORDER — TRAZODONE HYDROCHLORIDE 50 MG/1
50 TABLET ORAL NIGHTLY
Qty: 30 TABLET | Refills: 11 | Status: SHIPPED | OUTPATIENT
Start: 2018-07-30 | End: 2018-09-04

## 2018-07-30 NOTE — PROGRESS NOTES
Subjective     Patient ID: Ange Wade is a 38 y.o. female. Patient is here for management of multiple medical problems.     Chief Complaint   Patient presents with   • Sleep Apnea     6 week follow-up, patient states it is getting harder to wear CPAP machine    • Gyn referral     patient would like a referral to another GYN, patient states still having pain with intercourse   • Vomiting     patient continuing to have nausea and vomiting     History of Present Illness     Pt doing well on cpap.  Pt now having trouble with hose and staing on cpap.  Pt wearing and awake.   Not toleratingTolerating well  Pt having a bad week this week.     Gyn Dr Keith took tumor out and pain was supposed to be better.  Pt rescheduled.    Nausea and vomiting.  Still no improvement.  Still with ovarian mass.  Had blader tuck. No     Lower back pain getting worse   Last imaging 4 years ago. Pain getting worse.    Left arm pain and weakness and chest pain        The following portions of the patient's history were reviewed and updated as appropriate: allergies, current medications, past family history, past medical history, past social history, past surgical history and problem list.    Review of Systems   Constitutional: Positive for fatigue.   Respiratory: Negative for shortness of breath.    Gastrointestinal: Positive for abdominal distention, abdominal pain, nausea and vomiting.   Musculoskeletal: Positive for back pain.   All other systems reviewed and are negative.      Current Outpatient Prescriptions:   •  acetaZOLAMIDE (DIAMOX) 125 MG tablet, Take 2 tablets by mouth 3 (Three) Times a Day., Disp: 90 tablet, Rfl: 3  •  estradiol (MINIVELLE, VIVELLE-DOT) 0.075 MG/24HR patch, Place 1 patch on the skin 2 (Two) Times a Week., Disp: , Rfl:   •  Omeprazole (EQ OMEPRAZOLE) 20 MG tablet delayed-release, Take 20 mg by mouth Daily., Disp: 30 each, Rfl: 11  •  promethazine (PHENERGAN) 25 MG tablet, Take 1 tablet by mouth Every 6 (Six)  "Hours As Needed for Nausea or Vomiting., Disp: 120 tablet, Rfl: 0  •  vitamin B-12 (CYANOCOBALAMIN) 1000 MCG tablet, Take 1,000 mcg by mouth Daily. (PATIENT USURE MCG STRENGTH), Disp: , Rfl:   •  Pentoxifylline (TRENTAL PO), Take  by mouth Daily., Disp: , Rfl:   •  traZODone (DESYREL) 50 MG tablet, Take 1 tablet by mouth Every Night., Disp: 30 tablet, Rfl: 11    Current Facility-Administered Medications:   •  bupivacaine (MARCAINE) 0.25 % injection 5 mL, 5 mL, Injection, Once, Esa Miller MD, FAAN  •  bupivacaine PF (MARCAINE) 0.75 % injection 37.5 mg, 5 mL, Injection, Once, Esa Miller MD, FAAN  •  bupivacaine PF (MARCAINE) 0.75 % injection 37.5 mg, 5 mL, Injection, Once, Esa Miller MD, FAAN  •  bupivacaine PF (MARCAINE) 0.75 % injection 37.5 mg, 5 mL, Injection, Once, Esa Miller MD, FAAN    Objective      Blood pressure 113/79, pulse 81, temperature 97.4 °F (36.3 °C), temperature source Oral, resp. rate 16, height 162.6 cm (64\"), weight 88.5 kg (195 lb), SpO2 100 %, not currently breastfeeding.    Physical Exam     General Appearance:    Alert, cooperative, no distress, appears stated age   Head:    Normocephalic, without obvious abnormality, atraumatic   Eyes:    PERRL, conjunctiva/corneas clear, EOM's intact   Ears:    Normal TM's and external ear canals, both ears   Nose:   Nares normal, septum midline, mucosa normal, no drainage   or sinus tenderness   Throat:   Lips, mucosa, and tongue normal; teeth and gums normal   Neck:   Supple, symmetrical, trachea midline, no adenopathy;        thyroid:  No enlargement/tenderness/nodules; no carotid    bruit or JVD   Back:     Symmetric, no curvature, ROM normal, no CVA tenderness   Lungs:     Clear to auscultation bilaterally, respirations unlabored   Chest wall:    No tenderness or deformity   Heart:    Regular rate and rhythm, S1 and S2 normal, no murmur,        rub or gallop   Abdomen:     Soft, non-tender, bowel sounds active all four quadrants,     no " masses, no organomegaly   Extremities:   Extremities normal, atraumatic, no cyanosis or edema   Pulses:   2+ and symmetric all extremities   Skin:   Skin color, texture, turgor normal, no rashes or lesions   Lymph nodes:   Cervical, supraclavicular, and axillary nodes normal   Neurologic:   CNII-XII intact. Normal strength, sensation and reflexes       throughout      Results for orders placed or performed in visit on 01/12/18   TSH   Result Value Ref Range    TSH 0.703 0.470 - 4.680 mIU/mL   T4, Free   Result Value Ref Range    Free T4 1.44 0.78 - 2.19 ng/dL   Vitamin B12   Result Value Ref Range    Vitamin B-12 767 239 - 931 pg/mL   Comprehensive Metabolic Panel   Result Value Ref Range    Glucose 88 74 - 98 mg/dL    BUN 15 7 - 20 mg/dL    Creatinine 0.80 0.60 - 1.30 mg/dL    eGFR Non African Am 81 >60 mL/min/1.73    eGFR African Am 98 >60 mL/min/1.73    BUN/Creatinine Ratio 18.8 7.1 - 23.5    Sodium 140 137 - 145 mmol/L    Potassium 4.4 3.5 - 5.1 mmol/L    Chloride 103 98 - 107 mmol/L    Total CO2 28.0 26.0 - 30.0 mmol/L    Calcium 9.7 8.4 - 10.2 mg/dL    Total Protein 7.9 6.3 - 8.2 g/dL    Albumin 4.70 3.50 - 5.00 g/dL    Globulin 3.2 gm/dL    A/G Ratio 1.5 1.0 - 2.0 g/dL    Total Bilirubin 0.5 0.2 - 1.3 mg/dL    Alkaline Phosphatase 102 38 - 126 U/L    AST (SGOT) 19 15 - 46 U/L    ALT (SGPT) 33 13 - 69 U/L   CBC & Differential   Result Value Ref Range    WBC 8.42 4.80 - 10.80 10*3/mm3    RBC 5.21 4.20 - 5.40 10*6/mm3    Hemoglobin 13.8 12.0 - 16.0 g/dL    Hematocrit 42.6 37.0 - 47.0 %    MCV 81.8 81.0 - 99.0 fL    MCH 26.5 (L) 27.0 - 31.0 pg    MCHC 32.4 30.0 - 37.0 g/dL    RDW 13.7 11.5 - 14.5 %    Platelets 281 130 - 400 10*3/mm3    Neutrophil Rel % 74.5 37.0 - 80.0 %    Lymphocyte Rel % 18.1 10.0 - 50.0 %    Monocyte Rel % 5.0 0.0 - 12.0 %    Eosinophil Rel % 1.3 0.0 - 7.0 %    Basophil Rel % 0.7 0.0 - 2.5 %    Neutrophils Absolute 6.28 2.00 - 6.90 10*3/mm3    Lymphocytes Absolute 1.52 0.60 - 3.40 10*3/mm3     Monocytes Absolute 0.42 0.00 - 0.90 10*3/mm3    Eosinophils Absolute 0.11 0.00 - 0.70 10*3/mm3    Basophils Absolute 0.06 0.00 - 0.20 10*3/mm3    Immature Granulocyte Rel % 0.4 0.0 - 0.6 %    Immature Grans Absolute 0.03 0.00 - 0.06 10*3/mm3    nRBC 0.0 0.0 - 0.0 /100 WBC         Assessment/Plan     Pt likely tos worse in left arm.  Supported by hx and PE.   Pt will go to Dr Preston today.\  Get xrays    Ange was seen today for sleep apnea, gyn referral and vomiting.    Diagnoses and all orders for this visit:    Non-intractable cyclical vomiting with nausea  -     US Testicular or Ovarian Vascular Limited  -     Urinalysis With Microscopic - Urine, Clean Catch  -     Urine Culture - Urine, Urine, Catheter    Right ovarian cyst  -     US Testicular or Ovarian Vascular Limited  -     Urinalysis With Microscopic - Urine, Clean Catch  -     Urine Culture - Urine, Urine, Catheter    Primary insomnia    Chronic bilateral low back pain without sciatica  -     XR Spine Lumbar AP & Lateral With Flex & Ext; Future    Other chest pain  -     XR Spine Lumbar AP & Lateral With Flex & Ext; Future  -     XR Chest PA & Lateral; Future    Other orders  -     traZODone (DESYREL) 50 MG tablet; Take 1 tablet by mouth Every Night.      Return in about 6 weeks (around 9/10/2018).          There are no Patient Instructions on file for this visit.     Chase Correa MD    Assessment/Plan

## 2018-07-31 RX ORDER — AMOXICILLIN AND CLAVULANATE POTASSIUM 875; 125 MG/1; MG/1
1 TABLET, FILM COATED ORAL 2 TIMES DAILY
Qty: 20 TABLET | Refills: 0 | Status: SHIPPED | OUTPATIENT
Start: 2018-07-31 | End: 2018-09-04

## 2018-08-01 LAB — BACTERIA SPEC AEROBE CULT: NORMAL

## 2018-08-08 NOTE — TELEPHONE ENCOUNTER
"Pt called with c/o Diamox causing her to break out in a rash. Pt would like to know if there is another medication she can take for the \"pressure in her eyeballs\"    Pt has been instructed to d/c medicaiton   "

## 2018-08-21 RX ORDER — FUROSEMIDE 40 MG/1
40 TABLET ORAL DAILY
Qty: 30 TABLET | Refills: 0 | Status: SHIPPED | OUTPATIENT
Start: 2018-08-21 | End: 2018-09-04

## 2018-09-04 ENCOUNTER — OFFICE VISIT (OUTPATIENT)
Dept: INTERNAL MEDICINE | Facility: CLINIC | Age: 38
End: 2018-09-04

## 2018-09-04 VITALS
HEART RATE: 75 BPM | TEMPERATURE: 97.4 F | SYSTOLIC BLOOD PRESSURE: 108 MMHG | WEIGHT: 191 LBS | OXYGEN SATURATION: 100 % | RESPIRATION RATE: 16 BRPM | DIASTOLIC BLOOD PRESSURE: 97 MMHG | HEIGHT: 64 IN | BODY MASS INDEX: 32.61 KG/M2

## 2018-09-04 DIAGNOSIS — N30.01 ACUTE CYSTITIS WITH HEMATURIA: Primary | ICD-10-CM

## 2018-09-04 DIAGNOSIS — H05.112: ICD-10-CM

## 2018-09-04 LAB
BILIRUB BLD-MCNC: ABNORMAL MG/DL
CLARITY, POC: ABNORMAL
COLOR UR: ABNORMAL
GLUCOSE UR STRIP-MCNC: NEGATIVE MG/DL
KETONES UR QL: NEGATIVE
LEUKOCYTE EST, POC: ABNORMAL
NITRITE UR-MCNC: NEGATIVE MG/ML
PH UR: 6 [PH] (ref 5–8)
PROT UR STRIP-MCNC: NEGATIVE MG/DL
RBC # UR STRIP: NEGATIVE /UL
SP GR UR: 1.02 (ref 1–1.03)
UROBILINOGEN UR QL: ABNORMAL

## 2018-09-04 PROCEDURE — 99214 OFFICE O/P EST MOD 30 MIN: CPT | Performed by: INTERNAL MEDICINE

## 2018-09-04 PROCEDURE — 81003 URINALYSIS AUTO W/O SCOPE: CPT | Performed by: INTERNAL MEDICINE

## 2018-09-04 RX ORDER — AMOXICILLIN AND CLAVULANATE POTASSIUM 875; 125 MG/1; MG/1
1 TABLET, FILM COATED ORAL EVERY 12 HOURS SCHEDULED
Qty: 20 TABLET | Refills: 0 | Status: SHIPPED | OUTPATIENT
Start: 2018-09-04 | End: 2018-10-05

## 2018-09-04 NOTE — PROGRESS NOTES
Subjective     Patient ID: Ange Wade is a 38 y.o. female. Patient is here for management of multiple medical problems.     Chief Complaint   Patient presents with   • Vomiting     patient continuing to have vomiting   • Sleep Apnea     patient states the CPAP machine is not helping     History of Present Illness       Pt states Dr Keith had taken out the ovary left. Had removed x 2. By 2 saeid MD's and it still remain.  Pt is now worse following surgery.  Now with bladder spasm.      Pt states repeat u/s still shows ovary.     Pt wants to go to [place] for health care.      cpap usage getting worse with increase anxiety due to pelvic pain and frequency.  Stopped trazodone.   Pt was getting worse.  Pt with eye bal pressure and was given lasix with DR Miller per her report for Pseudotumor cerebri.      Pt has not yet had a good eye exam.\  Had a rash with diamox.    Pt AHI  Has increase to 13 was at 0.5.           The following portions of the patient's history were reviewed and updated as appropriate: allergies, current medications, past family history, past medical history, past social history, past surgical history and problem list.    Review of Systems   Constitutional: Positive for fatigue.   Genitourinary: Positive for enuresis and frequency. Negative for difficulty urinating, dyspareunia, dysuria and flank pain.   Psychiatric/Behavioral: Positive for sleep disturbance.   All other systems reviewed and are negative.      Current Outpatient Prescriptions:   •  estradiol (MINIVELLE, VIVELLE-DOT) 0.075 MG/24HR patch, Place 1 patch on the skin 2 (Two) Times a Week., Disp: , Rfl:   •  Omeprazole (EQ OMEPRAZOLE) 20 MG tablet delayed-release, Take 20 mg by mouth Daily., Disp: 30 each, Rfl: 11  •  Pentoxifylline (TRENTAL PO), Take  by mouth Daily., Disp: , Rfl:   •  promethazine (PHENERGAN) 25 MG tablet, Take 1 tablet by mouth Every 6 (Six) Hours As Needed for Nausea or Vomiting., Disp: 120 tablet, Rfl: 0  •   "vitamin B-12 (CYANOCOBALAMIN) 1000 MCG tablet, Take 1,000 mcg by mouth Daily. (PATIENT USURE MCG STRENGTH), Disp: , Rfl:   •  amoxicillin-clavulanate (AUGMENTIN) 875-125 MG per tablet, Take 1 tablet by mouth Every 12 (Twelve) Hours., Disp: 20 tablet, Rfl: 0    Current Facility-Administered Medications:   •  bupivacaine (MARCAINE) 0.25 % injection 5 mL, 5 mL, Injection, Once, Esa Miller MD, FAAN  •  bupivacaine PF (MARCAINE) 0.75 % injection 37.5 mg, 5 mL, Injection, Once, Esa Miller MD, FAAN  •  bupivacaine PF (MARCAINE) 0.75 % injection 37.5 mg, 5 mL, Injection, Once, Esa Miller MD, FAAN  •  bupivacaine PF (MARCAINE) 0.75 % injection 37.5 mg, 5 mL, Injection, Once, Esa Miller MD, FAAN    Objective      Blood pressure 108/97, pulse 75, temperature 97.4 °F (36.3 °C), temperature source Oral, resp. rate 16, height 162.6 cm (64\"), weight 86.6 kg (191 lb), SpO2 100 %, not currently breastfeeding.    Physical Exam     General Appearance:    Alert, cooperative, no distress, appears stated age   Head:    Normocephalic, without obvious abnormality, atraumatic   Eyes:    PERRL, conjunctiva/corneas clear, EOM's intact   Ears:    Normal TM's and external ear canals, both ears   Nose:   Nares normal, septum midline, mucosa normal, no drainage   or sinus tenderness   Throat:   Lips, mucosa, and tongue normal; teeth and gums normal   Neck:   Supple, symmetrical, trachea midline, no adenopathy;        thyroid:  No enlargement/tenderness/nodules; no carotid    bruit or JVD   Back:     Symmetric, no curvature, ROM normal, no CVA tenderness   Lungs:     Clear to auscultation bilaterally, respirations unlabored   Chest wall:    No tenderness or deformity   Heart:    Regular rate and rhythm, S1 and S2 normal, no murmur,        rub or gallop   Abdomen:     Soft, non-tender, bowel sounds active all four quadrants,     no masses, no organomegaly   Extremities:   Extremities normal, atraumatic, no cyanosis or edema "   Pulses:   2+ and symmetric all extremities   Skin:   Skin color, texture, turgor normal, no rashes or lesions   Lymph nodes:   Cervical, supraclavicular, and axillary nodes normal   Neurologic:   CNII-XII intact. Normal strength, sensation and reflexes       throughout      Results for orders placed or performed in visit on 07/30/18   Urine Culture - Urine, Urine, Catheter   Result Value Ref Range    Urine Culture Mixed Mariia Isolated    Urinalysis without microscopic (no culture) - Urine, Clean Catch   Result Value Ref Range    Color, UA Yellow Yellow, Straw    Appearance, UA Slightly Cloudy (A) Clear    pH, UA 6.0 5.0 - 8.0    Specific Gravity, UA >=1.030 1.005 - 1.030    Glucose, UA Negative Negative    Ketones, UA Negative Negative    Bilirubin, UA Negative Negative    Blood, UA Negative Negative    Protein, UA Negative Negative    Leuk Esterase, UA Negative Negative    Nitrite, UA Negative Negative    Urobilinogen, UA 0.2 E.U./dL 0.2 - 1.0 E.U./dL   Urinalysis, Microscopic Only - Urine, Clean Catch   Result Value Ref Range    RBC, UA 0-2 (A) None Seen /HPF    WBC, UA 0-2 (A) None Seen /HPF    Bacteria, UA Trace (A) None Seen /HPF    Squamous Epithelial Cells, UA 3-6 (A) None Seen, 0-2 /HPF    Hyaline Casts, UA None Seen None Seen /LPF    Mucus, UA Small/1+ (A) None Seen, Trace /HPF    Methodology Manual Light Microscopy          Assessment/Plan       Ange was seen today for vomiting and sleep apnea.    Diagnoses and all orders for this visit:    Acute cystitis with hematuria  -     Urinalysis With Microscopic - Urine, Clean Catch  -     POC Urinalysis Dipstick, Automated  -     Urine Culture - Urine, Urine, Catheter    Pseudotumor (inflammatory) of orbit, left  -     Ambulatory Referral to Optometry    Other orders  -     amoxicillin-clavulanate (AUGMENTIN) 875-125 MG per tablet; Take 1 tablet by mouth Every 12 (Twelve) Hours.    consider imaging of optic nerve.    Return in about 6 weeks (around  10/16/2018).  Pt will call dme and get cpap worked out.            There are no Patient Instructions on file for this visit.     Chase Correa MD    Assessment/Plan

## 2018-09-06 LAB
APPEARANCE UR: CLEAR
BACTERIA #/AREA URNS HPF: ABNORMAL /HPF
BACTERIA UR CULT: NORMAL
BACTERIA UR CULT: NORMAL
BILIRUB UR QL STRIP: (no result)
COLOR UR: YELLOW
EPI CELLS #/AREA URNS HPF: ABNORMAL /HPF
GLUCOSE UR QL: NEGATIVE
HGB UR QL STRIP: NEGATIVE
KETONES UR QL STRIP: (no result)
LEUKOCYTE ESTERASE UR QL STRIP: NEGATIVE
MUCOUS THREADS URNS QL MICRO: ABNORMAL /HPF
NITRITE UR QL STRIP: NEGATIVE
PH UR STRIP: 6 [PH] (ref 5–8)
PROT UR QL STRIP: (no result)
RBC #/AREA URNS HPF: ABNORMAL /HPF
SP GR UR: 1.02 (ref 1–1.03)
URNS CMNT MICRO: ABNORMAL
UROBILINOGEN UR STRIP-MCNC: (no result) MG/DL
WBC #/AREA URNS HPF: ABNORMAL /HPF

## 2018-09-12 ENCOUNTER — OFFICE VISIT (OUTPATIENT)
Dept: INTERNAL MEDICINE | Facility: CLINIC | Age: 38
End: 2018-09-12

## 2018-09-12 VITALS
TEMPERATURE: 97.5 F | OXYGEN SATURATION: 100 % | RESPIRATION RATE: 16 BRPM | HEART RATE: 75 BPM | BODY MASS INDEX: 32.95 KG/M2 | HEIGHT: 64 IN | SYSTOLIC BLOOD PRESSURE: 105 MMHG | DIASTOLIC BLOOD PRESSURE: 78 MMHG | WEIGHT: 193 LBS

## 2018-09-12 DIAGNOSIS — A04.8 H. PYLORI INFECTION: ICD-10-CM

## 2018-09-12 DIAGNOSIS — R11.10 VOMITING, INTRACTABILITY OF VOMITING NOT SPECIFIED, PRESENCE OF NAUSEA NOT SPECIFIED, UNSPECIFIED VOMITING TYPE: ICD-10-CM

## 2018-09-12 DIAGNOSIS — R51.9 PERSISTENT HEADACHES: Primary | ICD-10-CM

## 2018-09-12 PROCEDURE — 99214 OFFICE O/P EST MOD 30 MIN: CPT | Performed by: INTERNAL MEDICINE

## 2018-09-12 NOTE — PROGRESS NOTES
Subjective     Patient ID: Ange Wade is a 38 y.o. female. Patient is here for management of multiple medical problems.     Chief Complaint   Patient presents with   • Vomiting     patient states she is still having vomiting   • Eye issues     patient went to the Optomitrist, she was diagnosed with near sightedness and given glasses, patient states she is still having throbing in both eyes, patient states the left hurts worse than the right   • Urinary Tract Infection     patient still on Augmentin, she states her symptoms are not any better     History of Present Illness     Going to [place] with the ovarian issue.    Pt seen in op and has glasses.        Still with sever ha. Started on left eye exam.  Not yet had mri          Still with nausea and vomiting  Off gluten. durration years. meds no help. Surgery no help.              The following portions of the patient's history were reviewed and updated as appropriate: allergies, current medications, past family history, past medical history, past social history, past surgical history and problem list.    Review of Systems   HENT: Negative.    Respiratory: Negative.    Cardiovascular: Negative.    Psychiatric/Behavioral: Negative.    All other systems reviewed and are negative.      Current Outpatient Prescriptions:   •  amoxicillin-clavulanate (AUGMENTIN) 875-125 MG per tablet, Take 1 tablet by mouth Every 12 (Twelve) Hours., Disp: 20 tablet, Rfl: 0  •  estradiol (MINIVELLE, VIVELLE-DOT) 0.075 MG/24HR patch, Place 1 patch on the skin 2 (Two) Times a Week., Disp: , Rfl:   •  Omeprazole (EQ OMEPRAZOLE) 20 MG tablet delayed-release, Take 20 mg by mouth Daily., Disp: 30 each, Rfl: 11  •  Pentoxifylline (TRENTAL PO), Take  by mouth Daily., Disp: , Rfl:   •  promethazine (PHENERGAN) 25 MG tablet, Take 1 tablet by mouth Every 6 (Six) Hours As Needed for Nausea or Vomiting., Disp: 120 tablet, Rfl: 0  •  vitamin B-12 (CYANOCOBALAMIN) 1000 MCG tablet, Take 1,000 mcg by  "mouth Daily. (PATIENT USURE MCG STRENGTH), Disp: , Rfl:     Current Facility-Administered Medications:   •  bupivacaine (MARCAINE) 0.25 % injection 5 mL, 5 mL, Injection, Once, Esa Miller MD, FAAN  •  bupivacaine PF (MARCAINE) 0.75 % injection 37.5 mg, 5 mL, Injection, Once, Esa Miller MD, FAAN  •  bupivacaine PF (MARCAINE) 0.75 % injection 37.5 mg, 5 mL, Injection, Once, Esa Miller MD, FAAN  •  bupivacaine PF (MARCAINE) 0.75 % injection 37.5 mg, 5 mL, Injection, Once, Esa Miller MD, FAAN    Objective      Blood pressure 105/78, pulse 75, temperature 97.5 °F (36.4 °C), temperature source Oral, resp. rate 16, height 162.6 cm (64\"), weight 87.5 kg (193 lb), SpO2 100 %, not currently breastfeeding.    Physical Exam     General Appearance:    Alert, cooperative, no distress, appears stated age   Head:    Normocephalic, without obvious abnormality, atraumatic   Eyes:    PERRL, conjunctiva/corneas clear, EOM's intact   Ears:    Normal TM's and external ear canals, both ears   Nose:   Nares normal, septum midline, mucosa normal, no drainage   or sinus tenderness   Throat:   Lips, mucosa, and tongue normal; teeth and gums normal   Neck:   Supple, symmetrical, trachea midline, no adenopathy;        thyroid:  No enlargement/tenderness/nodules; no carotid    bruit or JVD   Back:     Symmetric, no curvature, ROM normal, no CVA tenderness   Lungs:     Clear to auscultation bilaterally, respirations unlabored   Chest wall:    No tenderness or deformity   Heart:    Regular rate and rhythm, S1 and S2 normal, no murmur,        rub or gallop   Abdomen:     Soft, non-tender, bowel sounds active all four quadrants,     no masses, no organomegaly   Extremities:   Extremities normal, atraumatic, no cyanosis or edema   Pulses:   2+ and symmetric all extremities   Skin:   Skin color, texture, turgor normal, no rashes or lesions   Lymph nodes:   Cervical, supraclavicular, and axillary nodes normal   Neurologic:   CNII-XII " intact. Normal strength, sensation and reflexes       throughout      Results for orders placed or performed in visit on 09/04/18   Urine Culture - Urine, Urine, Catheter   Result Value Ref Range    Urine Culture Final report     Result 1 Comment    Microscopic Examination   Result Value Ref Range    WBC, UA 0-2 (A) None Seen /HPF    RBC, UA 0-2 (A) None Seen /HPF    Epithelial Cells (non renal) 3-6 (A) /HPF    Mucus, UA See below: (A) /HPF    Bacteria, UA 1+ (A) None Seen /HPF    UA Comment Comment    POC Urinalysis Dipstick, Automated   Result Value Ref Range    Color Dark Yellow Yellow, Straw, Dark Yellow, Cyndie    Clarity, UA Hazy (A) Clear    Specific Gravity  1.020 1.005 - 1.030    pH, Urine 6.0 5.0 - 8.0    Leukocytes 500 Shay/ul (A) Negative    Nitrite, UA Negative Negative    Protein, POC Negative Negative mg/dL    Glucose, UA Negative Negative, 1000 mg/dL (3+) mg/dL    Ketones, UA Negative Negative    Urobilinogen, UA 4 E.U./dL  (A) Normal    Bilirubin 1 mg/dL (A) Negative    Blood, UA Negative Negative   Urinalysis With Microscopic - Urine, Clean Catch   Result Value Ref Range    Specific Gravity, UA 1.025 1.005 - 1.030    pH, UA 6.0 5.0 - 8.0    Color, UA Yellow     Appearance, UA Clear Clear    Leukocytes, UA Negative Negative    Protein See below: (A) Negative    Glucose, UA Negative Negative    Ketones Trace (A) Negative    Blood, UA Negative Negative    Bilirubin, UA See below: (A) Negative    Urobilinogen, UA Comment     Nitrite, UA Negative Negative         Assessment/Plan       Ange was seen today for vomiting, eye issues and urinary tract infection.    Diagnoses and all orders for this visit:    Persistent headaches  -     MRI Brain With & Without Contrast; Future    Vomiting, intractability of vomiting not specified, presence of nausea not specified, unspecified vomiting type  -     H. Pylori Antibody, IgA  -     H. Pylori Antibody, IgG    H. pylori infection  -     H. Pylori Antibody, IgA  -      H. Pylori Antibody, IgG      Return in about 6 months (around 3/12/2019).          There are no Patient Instructions on file for this visit.     Chase Correa MD    Assessment/Plan

## 2018-09-13 ENCOUNTER — TELEPHONE (OUTPATIENT)
Dept: INTERNAL MEDICINE | Facility: CLINIC | Age: 38
End: 2018-09-13

## 2018-09-13 ENCOUNTER — HOSPITAL ENCOUNTER (OUTPATIENT)
Dept: MRI IMAGING | Facility: HOSPITAL | Age: 38
Discharge: HOME | End: 2018-09-13
Attending: INTERNAL MEDICINE | Admitting: INTERNAL MEDICINE

## 2018-09-13 DIAGNOSIS — R51.9 PERSISTENT HEADACHES: ICD-10-CM

## 2018-09-13 LAB
H PYLORI IGA SER-ACNC: <9 UNITS (ref 0–8.9)
H PYLORI IGG SER IA-ACNC: 0.33 INDEX VALUE (ref 0–0.79)

## 2018-09-13 PROCEDURE — 70553 MRI BRAIN STEM W/O & W/DYE: CPT

## 2018-09-13 PROCEDURE — A9577 INJ MULTIHANCE: HCPCS | Performed by: INTERNAL MEDICINE

## 2018-09-13 PROCEDURE — 0 GADOBENATE DIMEGLUMINE 529 MG/ML SOLUTION: Performed by: INTERNAL MEDICINE

## 2018-09-13 RX ADMIN — GADOBENATE DIMEGLUMINE 15 ML: 529 INJECTION, SOLUTION INTRAVENOUS at 15:41

## 2018-09-13 NOTE — TELEPHONE ENCOUNTER
Patient came in the office and wanted me to give you the information to fax her CT imagine report and her MRI that she will be having today at 3:00 to [facility] @ [phone].

## 2018-09-13 NOTE — TELEPHONE ENCOUNTER
We are waiting on the MRI results, as soon as we have it and Dr. Correa has reviewed it patient can come into the office to  a copy. The CT was ordered by Dr. Miller and patient will have to go to his office to get a copy of those results. Patient notified.

## 2018-09-17 ENCOUNTER — TELEPHONE (OUTPATIENT)
Dept: NEUROLOGY | Facility: CLINIC | Age: 38
End: 2018-09-17

## 2018-09-17 NOTE — TELEPHONE ENCOUNTER
Pt called requesting refill on Lasix if Dr. Miller wants her to continue taking it for the throbbing in her eyes or if he is going to change it because it's not really helping her.  Pt was told by PCP that she needed to call and have someone explain her MRI to her.     Pt requests a call back at [phone]

## 2018-09-20 NOTE — TELEPHONE ENCOUNTER
PT CALLED BACK AGAIN REGARDING THE LASIX AND HER EYE. PT NEEDS TO KNOW WHAT SHE NEEDS TO DO. IF MEDICATION IS GOING TO BE SENT IN, IT NEEDS TO GO TO [facility] IN [place], [place].    PT REQUESTS CALL BACK.

## 2018-09-23 RX ORDER — FUROSEMIDE 40 MG/1
40 TABLET ORAL DAILY
Qty: 30 TABLET | Refills: 11 | Status: SHIPPED | OUTPATIENT
Start: 2018-09-23 | End: 2019-09-23

## 2018-10-01 ENCOUNTER — TELEPHONE (OUTPATIENT)
Dept: NEUROLOGY | Facility: CLINIC | Age: 38
End: 2018-10-01

## 2018-10-05 ENCOUNTER — PROCEDURE VISIT (OUTPATIENT)
Dept: NEUROLOGY | Facility: CLINIC | Age: 38
End: 2018-10-05

## 2018-10-05 ENCOUNTER — PREP FOR SURGERY (OUTPATIENT)
Dept: OTHER | Facility: HOSPITAL | Age: 38
End: 2018-10-05

## 2018-10-05 VITALS
SYSTOLIC BLOOD PRESSURE: 108 MMHG | HEART RATE: 76 BPM | OXYGEN SATURATION: 99 % | DIASTOLIC BLOOD PRESSURE: 86 MMHG | HEIGHT: 64 IN

## 2018-10-05 DIAGNOSIS — H05.119 INFLAMMATORY PSEUDOTUMOR OF ORBIT, UNSPECIFIED LATERALITY: Primary | ICD-10-CM

## 2018-10-05 DIAGNOSIS — M54.2 NECK PAIN: ICD-10-CM

## 2018-10-05 DIAGNOSIS — M79.18 MYOFASCIAL MUSCLE PAIN: ICD-10-CM

## 2018-10-05 DIAGNOSIS — G44.209 TENSION HEADACHE: ICD-10-CM

## 2018-10-05 PROCEDURE — 20553 NJX 1/MLT TRIGGER POINTS 3/>: CPT | Performed by: PSYCHIATRY & NEUROLOGY

## 2018-10-05 NOTE — PROGRESS NOTES
Procedure Note    Procedure: Trigger point injection    Indication:  Patient is a pleasant 37-year-old referred to [facility] [place] for mixed migraine and tension headache, neck pain and myofascial pain.  Patient initially suffer from headache 30 out of 30 days prior to injection.  Despite taking Imitrex Zomig, Zofran, Phenergan, amitriptyline and Topamax. Patient benefited from previous trigger point injection over the last 3 month patient has been headache free until 8 days ago.    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 Risks and benefits of the Trigger point injection procedure have been explained to the patient.  Patient has no contraindications such as bleed diathesis, recent acute trauma at the muscle sites, anesthetic allergy or anticoagulation.  Mechanism of trigger point injection has been explained to patient.     Procedure Note:  1.         Patient was positioned comfortably  2.         Before injections are started, 10 Trigger Points sites are identified throughout the bilateral upper trapezius muscle, levator scapulae, and erector spinae muscles.    3.         Overlying skin area was cleaned with Alcohol swab                                                                                                              4.         Before injection, trigger points sites were palpated for local twitch and referred pain to confirms placement                         5.         Local anesthetic was mixed with Depo-Medrol (5 cc of Marcaine 0.5% mixed with 5 cc of Depo-Medrol at 40 mg/ml - for a total of 10 cc)  6.         30 gauge ½ inch needle was utilized to ensure patient comfort          7.         I started with the most tender spot in above mentioned Trigger Points   1.   Localize most tender spot within taut muscle-fibers  2.   Fix tender spot between fingers (1-2 cm in size)   1.   Prevents from rolling away from needle  2.   Controls subcutaneous  bleeding  3.   Before injection, patient was instructed of possible pain on injection  4.   Direct needle at 30 degree angle off skin   8.         Insert needle into skin 1-2 cm from Trigger Point   9.         Advance needle into Trigger Point   10.       Use 1cc anesthetic at each Trigger Points identified in step #2  11.       Redirect needle and reinject                                                                                              1.   Withdraw needle to subcutaneous tissue  2.   Redirect needle into adjacent tender areas  3.   Repeat until local twitch or tautness resolves  12.   After each of the 10 injections I held direct pressure at injection site for 2 minutes to prevents hematoma formation  13.   The same procedure was repeated for other Tender Points located in the upper trapezius muscle, levator scapulae and erector spinae bilaterally  14.   Patient was instructed to gently stretch injected areas to full active range of motion in all directions and was instructed to repeat range of motion three times after injection  15.   Post-Procedure patient was instructed to avoid over-using injected area for 3-4 days   1.   Maintain active range of motion of injected muscle  2.   Patient applies ice to injected areas for a few hours  3.   Anticipate post-injection soreness for 3-4 days  There was no evidence of complications from injection was noted such as local Skin Infection  at injection site or local hematoma at injection site      Marcaine  NDC #0409-116 0-1 8  Lot number [id number]  Expiration date January 1, 2020    Depo-Medrol  NDC #0009-028 0-02  Lots number [id number]  Expiration date August 2020    Esa Miller MD, FAAN  10/05/2018

## 2018-10-06 RX ORDER — METHYLPREDNISOLONE ACETATE 40 MG/ML
200 INJECTION, SUSPENSION INTRA-ARTICULAR; INTRALESIONAL; INTRAMUSCULAR; SOFT TISSUE ONCE
Status: COMPLETED | OUTPATIENT
Start: 2018-10-06 | End: 2018-10-06

## 2018-10-06 RX ORDER — BUPIVACAINE HYDROCHLORIDE 2.5 MG/ML
5 INJECTION, SOLUTION INFILTRATION; PERINEURAL ONCE
Status: DISCONTINUED | OUTPATIENT
Start: 2018-10-06 | End: 2019-01-07

## 2018-10-06 RX ADMIN — METHYLPREDNISOLONE ACETATE 200 MG: 40 INJECTION, SUSPENSION INTRA-ARTICULAR; INTRALESIONAL; INTRAMUSCULAR; SOFT TISSUE at 16:31

## 2018-10-13 DIAGNOSIS — R10.13 EPIGASTRIC ABDOMINAL PAIN: ICD-10-CM

## 2018-10-13 DIAGNOSIS — R11.0 NAUSEA: ICD-10-CM

## 2018-10-15 RX ORDER — PROMETHAZINE HYDROCHLORIDE 25 MG/1
TABLET ORAL
Qty: 120 TABLET | Refills: 0 | Status: SHIPPED | OUTPATIENT
Start: 2018-10-15 | End: 2019-02-06 | Stop reason: SDUPTHER

## 2018-10-16 ENCOUNTER — OFFICE VISIT (OUTPATIENT)
Dept: INTERNAL MEDICINE | Facility: CLINIC | Age: 38
End: 2018-10-16

## 2018-10-16 VITALS
OXYGEN SATURATION: 100 % | SYSTOLIC BLOOD PRESSURE: 110 MMHG | RESPIRATION RATE: 16 BRPM | TEMPERATURE: 97.3 F | WEIGHT: 192 LBS | BODY MASS INDEX: 32.78 KG/M2 | DIASTOLIC BLOOD PRESSURE: 74 MMHG | HEART RATE: 81 BPM | HEIGHT: 64 IN

## 2018-10-16 DIAGNOSIS — R30.0 DYSURIA: Primary | ICD-10-CM

## 2018-10-16 PROCEDURE — 99214 OFFICE O/P EST MOD 30 MIN: CPT | Performed by: INTERNAL MEDICINE

## 2018-10-16 RX ORDER — TOLTERODINE 4 MG/1
4 CAPSULE, EXTENDED RELEASE ORAL DAILY
Qty: 30 CAPSULE | Refills: 11
Start: 2018-10-16

## 2018-10-16 NOTE — PROGRESS NOTES
Subjective     Patient ID: Ange Wade is a 38 y.o. female. Patient is here for management of multiple medical problems.     Chief Complaint   Patient presents with   • Headache     follow-up, patient states her eyes are continuing to hurt, left eye worse than the right eye     History of Present Illness           The following portions of the   HA. Pt needs LP and is set up.    h pylori ab now neg. Gi issues better.    Bladder spasm.            patient's history were reviewed and updated as appropriate: allergies, current medications, past family history, past medical history, past social history, past surgical history and problem list.    Review of Systems   Constitutional: Negative for activity change, diaphoresis, fatigue and fever.   HENT: Negative for congestion, dental problem, drooling, ear discharge, sinus pressure and sneezing.    Gastrointestinal: Positive for abdominal distention. Negative for abdominal pain and anal bleeding.   Musculoskeletal: Negative for arthralgias and back pain.   All other systems reviewed and are negative.      Current Outpatient Prescriptions:   •  furosemide (LASIX) 40 MG tablet, Take 1 tablet by mouth Daily., Disp: 30 tablet, Rfl: 11  •  Omeprazole (EQ OMEPRAZOLE) 20 MG tablet delayed-release, Take 20 mg by mouth Daily., Disp: 30 each, Rfl: 11  •  promethazine (PHENERGAN) 25 MG tablet, TAKE 1 TABLET BY MOUTH EVERY SIX HOURS AS NEEDED FOR NAUSEA AND VOMITING, Disp: 120 tablet, Rfl: 0  •  vitamin B-12 (CYANOCOBALAMIN) 1000 MCG tablet, Take 1,000 mcg by mouth Daily. (PATIENT USURE MCG STRENGTH), Disp: , Rfl:   •  tolterodine LA (DETROL LA) 4 MG 24 hr capsule, Take 1 capsule by mouth Daily., Disp: 30 capsule, Rfl: 11    Current Facility-Administered Medications:   •  bupivacaine (MARCAINE) 0.25 % injection 5 mL, 5 mL, Injection, Once, Esa Miller MD, FAAN  •  bupivacaine (MARCAINE) 0.25 % injection 5 mL, 5 mL, Injection, Once, Esa Miller MD, FAAN  •  bupivacaine PF  "(MARCAINE) 0.75 % injection 37.5 mg, 5 mL, Injection, Once, Esa Miller MD, FAAN  •  bupivacaine PF (MARCAINE) 0.75 % injection 37.5 mg, 5 mL, Injection, Once, Esa Miller MD, FAAN  •  bupivacaine PF (MARCAINE) 0.75 % injection 37.5 mg, 5 mL, Injection, Once, Esa Miller MD, FAAN    Objective      Blood pressure 110/74, pulse 81, temperature 97.3 °F (36.3 °C), temperature source Oral, resp. rate 16, height 162.6 cm (64\"), weight 87.1 kg (192 lb), SpO2 100 %, not currently breastfeeding.    Physical Exam     General Appearance:    Alert, cooperative, no distress, appears stated age   Head:    Normocephalic, without obvious abnormality, atraumatic   Eyes:    PERRL, conjunctiva/corneas clear, EOM's intact   Ears:    Normal TM's and external ear canals, both ears   Nose:   Nares normal, septum midline, mucosa normal, no drainage   or sinus tenderness   Throat:   Lips, mucosa, and tongue normal; teeth and gums normal   Neck:   Supple, symmetrical, trachea midline, no adenopathy;        thyroid:  No enlargement/tenderness/nodules; no carotid    bruit or JVD   Back:     Symmetric, no curvature, ROM normal, no CVA tenderness   Lungs:     Clear to auscultation bilaterally, respirations unlabored   Chest wall:    No tenderness or deformity   Heart:    Regular rate and rhythm, S1 and S2 normal, no murmur,        rub or gallop   Abdomen:     Soft, non-tender, bowel sounds active all four quadrants,     no masses, no organomegaly   Extremities:   Extremities normal, atraumatic, no cyanosis or edema   Pulses:   2+ and symmetric all extremities   Skin:   Skin color, texture, turgor normal, no rashes or lesions   Lymph nodes:   Cervical, supraclavicular, and axillary nodes normal   Neurologic:   CNII-XII intact. Normal strength, sensation and reflexes       throughout      Results for orders placed or performed in visit on 09/12/18   H. Pylori Antibody, IgA   Result Value Ref Range    H. pylori, IgA ABS <9.0 0.0 - 8.9 " units   H. Pylori Antibody, IgG   Result Value Ref Range    H. pylori IgG 0.33 0.00 - 0.79 Index Value         Assessment/Plan   Pt with rash on diamox.  Lasix just increased urination. No improvement on HA.  LP pending.        Ange was seen today for headache.    Diagnoses and all orders for this visit:    Dysuria  -     Urinalysis With Microscopic - Urine, Clean Catch  -     Urine Culture - Urine, Urine, Clean Catch    Other orders  -     tolterodine LA (DETROL LA) 4 MG 24 hr capsule; Take 1 capsule by mouth Daily.      Return in about 3 months (around 1/16/2019).          There are no Patient Instructions on file for this visit.     Chase Correa MD    Assessment/Plan

## 2018-10-18 LAB
APPEARANCE UR: CLEAR
BACTERIA #/AREA URNS HPF: ABNORMAL /HPF
BACTERIA UR CULT: NORMAL
BACTERIA UR CULT: NORMAL
BILIRUB UR QL STRIP: NEGATIVE
COLOR UR: YELLOW
EPI CELLS #/AREA URNS HPF: ABNORMAL /HPF
GLUCOSE UR QL: NEGATIVE
HGB UR QL STRIP: NEGATIVE
KETONES UR QL STRIP: NEGATIVE
LEUKOCYTE ESTERASE UR QL STRIP: NEGATIVE
NITRITE UR QL STRIP: NEGATIVE
PH UR STRIP: 6.5 [PH] (ref 5–8)
PROT UR QL STRIP: NEGATIVE
RBC #/AREA URNS HPF: ABNORMAL /HPF
SP GR UR: 1.02 (ref 1–1.03)
UROBILINOGEN UR STRIP-MCNC: (no result) MG/DL
WBC #/AREA URNS HPF: ABNORMAL /HPF

## 2018-10-23 ENCOUNTER — APPOINTMENT (OUTPATIENT)
Dept: LAB | Facility: HOSPITAL | Age: 38
End: 2018-10-23

## 2018-10-23 ENCOUNTER — HOSPITAL ENCOUNTER (OUTPATIENT)
Dept: GENERAL RADIOLOGY | Facility: HOSPITAL | Age: 38
Discharge: HOME | End: 2018-10-23
Attending: PSYCHIATRY & NEUROLOGY | Admitting: RADIOLOGY

## 2018-10-23 DIAGNOSIS — H05.119 INFLAMMATORY PSEUDOTUMOR OF ORBIT, UNSPECIFIED LATERALITY: ICD-10-CM

## 2018-10-23 LAB
APPEARANCE CSF: CLEAR
APTT PPP: 29.7 SECONDS (ref 25–36)
COLOR CSF: COLORLESS
GLUCOSE CSF-MCNC: 52 MG/DL
GRAM STN SPEC: NORMAL
GRAM STN SPEC: NORMAL
INR PPP: 1.04 (ref 0.9–1.1)
PLATELET # BLD AUTO: 301 10*3/MM3 (ref 130–400)
PROT CSF-MCNC: 42 MG/DL
PROTHROMBIN TIME: 11.6 SECONDS (ref 9.3–12.1)
RBC # CSF MANUAL: 2 /MM3 (ref 0–0)
TUBE # CSF: 2
WBC # CSF MANUAL: 0 /MM3 (ref 0–5)

## 2018-10-23 PROCEDURE — 82945 GLUCOSE OTHER FLUID: CPT | Performed by: PSYCHIATRY & NEUROLOGY

## 2018-10-23 PROCEDURE — 89050 BODY FLUID CELL COUNT: CPT | Performed by: PSYCHIATRY & NEUROLOGY

## 2018-10-23 PROCEDURE — 85049 AUTOMATED PLATELET COUNT: CPT | Performed by: RADIOLOGY

## 2018-10-23 PROCEDURE — 85610 PROTHROMBIN TIME: CPT | Performed by: RADIOLOGY

## 2018-10-23 PROCEDURE — 84157 ASSAY OF PROTEIN OTHER: CPT | Performed by: PSYCHIATRY & NEUROLOGY

## 2018-10-23 PROCEDURE — 85730 THROMBOPLASTIN TIME PARTIAL: CPT | Performed by: RADIOLOGY

## 2018-10-23 PROCEDURE — 87899 AGENT NOS ASSAY W/OPTIC: CPT | Performed by: PSYCHIATRY & NEUROLOGY

## 2018-10-23 PROCEDURE — 77003 FLUOROGUIDE FOR SPINE INJECT: CPT

## 2018-10-23 PROCEDURE — 86592 SYPHILIS TEST NON-TREP QUAL: CPT | Performed by: PSYCHIATRY & NEUROLOGY

## 2018-10-23 PROCEDURE — 87205 SMEAR GRAM STAIN: CPT | Performed by: PSYCHIATRY & NEUROLOGY

## 2018-10-23 RX ORDER — LIDOCAINE HYDROCHLORIDE 10 MG/ML
10 INJECTION, SOLUTION INFILTRATION; PERINEURAL ONCE
Status: COMPLETED | OUTPATIENT
Start: 2018-10-23 | End: 2018-10-23

## 2018-10-23 RX ADMIN — LIDOCAINE HYDROCHLORIDE 10 ML: 10 INJECTION, SOLUTION INFILTRATION; PERINEURAL at 12:14

## 2018-10-24 LAB — REAGIN AB CSF QL: NON REACTIVE

## 2018-10-25 ENCOUNTER — TELEPHONE (OUTPATIENT)
Dept: NEUROLOGY | Facility: CLINIC | Age: 38
End: 2018-10-25

## 2018-10-25 LAB
CAP MANDATED REFLEX TO CULTURE: NORMAL
CRYPTOC AG CSF QL IA.RAPID: NEGATIVE

## 2018-10-29 LAB
LAB AP CASE REPORT: NORMAL
LAB AP CLINICAL INFORMATION: NORMAL
LAB AP DIAGNOSIS COMMENT: NORMAL

## 2018-11-06 ENCOUNTER — TELEPHONE (OUTPATIENT)
Dept: INTERNAL MEDICINE | Facility: CLINIC | Age: 38
End: 2018-11-06

## 2018-11-07 ENCOUNTER — TELEPHONE (OUTPATIENT)
Dept: INTERNAL MEDICINE | Facility: CLINIC | Age: 38
End: 2018-11-07

## 2018-11-07 DIAGNOSIS — H47.10 PAPILLEDEMA: Primary | ICD-10-CM

## 2018-11-07 NOTE — TELEPHONE ENCOUNTER
Patient called back stating that she called [facility] and that they received order, but are needing doctor's notes back from August of this year, insurance cards and MRI results. Those can be faxed to [phone], attention central scheduling.

## 2018-11-09 ENCOUNTER — TELEPHONE (OUTPATIENT)
Dept: NEUROLOGY | Facility: CLINIC | Age: 38
End: 2018-11-09

## 2018-11-09 NOTE — TELEPHONE ENCOUNTER
Pt called concerning her results of her LP.  She would like to have someone call her back with the results.    Patient was informed that Dr. Miller is out of office until 11/12.    Please Advise

## 2018-11-13 ENCOUNTER — OFFICE VISIT (OUTPATIENT)
Dept: NEUROLOGY | Facility: CLINIC | Age: 38
End: 2018-11-13

## 2018-11-13 VITALS
OXYGEN SATURATION: 98 % | DIASTOLIC BLOOD PRESSURE: 78 MMHG | HEART RATE: 79 BPM | BODY MASS INDEX: 32.96 KG/M2 | HEIGHT: 64 IN | SYSTOLIC BLOOD PRESSURE: 112 MMHG

## 2018-11-13 DIAGNOSIS — G93.2 PSEUDOTUMOR CEREBRI: Primary | ICD-10-CM

## 2018-11-13 PROCEDURE — 99213 OFFICE O/P EST LOW 20 MIN: CPT | Performed by: PSYCHIATRY & NEUROLOGY

## 2018-11-14 ENCOUNTER — TELEPHONE (OUTPATIENT)
Dept: NEUROLOGY | Facility: CLINIC | Age: 38
End: 2018-11-14

## 2018-11-14 ENCOUNTER — OFFICE VISIT (OUTPATIENT)
Dept: INTERNAL MEDICINE | Facility: CLINIC | Age: 38
End: 2018-11-14

## 2018-11-14 VITALS
RESPIRATION RATE: 16 BRPM | HEART RATE: 86 BPM | TEMPERATURE: 97.4 F | WEIGHT: 199.4 LBS | BODY MASS INDEX: 34.04 KG/M2 | SYSTOLIC BLOOD PRESSURE: 110 MMHG | DIASTOLIC BLOOD PRESSURE: 66 MMHG | HEIGHT: 64 IN | OXYGEN SATURATION: 100 %

## 2018-11-14 DIAGNOSIS — J02.9 SORE THROAT: ICD-10-CM

## 2018-11-14 DIAGNOSIS — R05.9 COUGH: Primary | ICD-10-CM

## 2018-11-14 DIAGNOSIS — R53.81 MALAISE: ICD-10-CM

## 2018-11-14 DIAGNOSIS — J40 BRONCHITIS: ICD-10-CM

## 2018-11-14 LAB
EXPIRATION DATE: NORMAL
EXPIRATION DATE: NORMAL
FLUAV AG NPH QL: NEGATIVE
FLUBV AG NPH QL: NEGATIVE
INTERNAL CONTROL: NORMAL
INTERNAL CONTROL: NORMAL
Lab: NORMAL
Lab: NORMAL
S PYO AG THROAT QL: NEGATIVE

## 2018-11-14 PROCEDURE — 99213 OFFICE O/P EST LOW 20 MIN: CPT | Performed by: INTERNAL MEDICINE

## 2018-11-14 PROCEDURE — 87804 INFLUENZA ASSAY W/OPTIC: CPT | Performed by: INTERNAL MEDICINE

## 2018-11-14 RX ORDER — DOXYCYCLINE 100 MG/1
100 CAPSULE ORAL EVERY 12 HOURS SCHEDULED
Qty: 20 CAPSULE | Refills: 0 | Status: SHIPPED | OUTPATIENT
Start: 2018-11-14 | End: 2018-11-16

## 2018-11-14 NOTE — PROGRESS NOTES
[facility] NEUROLOGY [place] PROGRESS NOTE  History of Present Illness     Date: 2018    Patient Identification  Ange Wade is a 38 y.o. female.    Patient information was obtained from patient.  History/Exam limitations: none.    Original consultation requested by: Chase Juarez MD    Chief Complaint   Results (Pt in office today to review results of lumbar puncture that was performed at hospital )      History of Present Illness   Patient is a pleasant 38-year-old referred to [facility] neurolog[facility] [place] for evaluation of pseudotumor cerebri.  Interval history since last visit patient underwent lumbar puncture patient reported that her vision has improved and her headache has improved and she is allergic to Diamox and she is currently taking Lasix.  She is interested in getting ventriculoperitoneal shunt to be performed at [place].      PMH:   Past Medical History:   Diagnosis Date   • Abnormal ECG ?    [facility] [place] [facility]   • Abnormal Pap smear of cervix     Precancerous cells   • Anemia    • Body piercing     EARS   • DVT (deep vein thrombosis) in pregnancy (CMS/[facility])     REPORTS 15 YEAR AGO   • Fibroid uterus    • History of blood clots     after c section    • History of CT scan    • History of MRI    • History of Papanicolaou smear of cervix 2015    NORMAL    • Migraine    • Ovarian cyst     Had uterus removed 2015   • Pelvic adhesive disease    • Phonophobia    • PMS (premenstrual syndrome)    • Preeclampsia 1-    During my 1st pregnacy   • Urinary tract infection     Only had during last pregnacy   • Varicella    • Vasovagal syncope    • Wears dentures     FULL UPPER PLATE, INSTRUCTED NO ADHESIVES DOS       Past Surgical History:   Past Surgical History:   Procedure Laterality Date   • APPENDECTOMY     • BLADDER REPAIR  2015    REPAIR OF INCIDENTAL CYSTOTOMY (DR ROGER CRAWFORD)   •  SECTION     •  SECTION      • DIAGNOSTIC LAPAROSCOPY  2015    DR ROGER CRAWFORD   • HYSTERECTOMY  2016    LUPE (DR ROGER CRAWFORD), REPORTS LEFT OVARY INTACT   • MOUTH SURGERY     • TOE SURGERY Right 1991    GREAT TOE   • TONSILLECTOMY  82 or 83   • TUBAL ABDOMINAL LIGATION     • WISDOM TOOTH EXTRACTION  3-2003       Family Hisotry:   Family History   Problem Relation Age of Onset   • Mental illness Other    • Cancer Other    • Diabetes Other    • Hypertension Other    • Heart disease Other    • Stroke Other    • Breast cancer Maternal Grandmother    • Ovarian cancer Maternal Grandmother    • Stroke Maternal Grandmother    • Diabetes Maternal Grandmother    • Breast cancer Maternal Aunt    • Stroke Paternal Grandmother    • Diabetes Paternal Grandmother    • Heart disease Mother    • COPD Mother    • Thyroid disease Mother        Social History:   Social History     Socioeconomic History   • Marital status:      Spouse name: Not on file   • Number of children: Not on file   • Years of education: Not on file   • Highest education level: Not on file   Social Needs   • Financial resource strain: Not on file   • Food insecurity - worry: Not on file   • Food insecurity - inability: Not on file   • Transportation needs - medical: Not on file   • Transportation needs - non-medical: Not on file   Occupational History   • Not on file   Tobacco Use   • Smoking status: Former Smoker     Packs/day: 2.00     Years: 3.00     Pack years: 6.00     Types: Cigarettes     Last attempt to quit: 1996     Years since quittin.7   • Smokeless tobacco: Never Used   Substance and Sexual Activity   • Alcohol use: No   • Drug use: No   • Sexual activity: Defer   Other Topics Concern   • Not on file   Social History Narrative   • Not on file       Medications:   Current Outpatient Medications   Medication Sig Dispense Refill   • furosemide (LASIX) 40 MG tablet Take 1 tablet by mouth Daily. 30 tablet 11   • Omeprazole (EQ OMEPRAZOLE) 20 MG  tablet delayed-release Take 20 mg by mouth Daily. 30 each 11   • promethazine (PHENERGAN) 25 MG tablet TAKE 1 TABLET BY MOUTH EVERY SIX HOURS AS NEEDED FOR NAUSEA AND VOMITING 120 tablet 0   • tolterodine LA (DETROL LA) 4 MG 24 hr capsule Take 1 capsule by mouth Daily. 30 capsule 11   • vitamin B-12 (CYANOCOBALAMIN) 1000 MCG tablet Take 1,000 mcg by mouth Daily. (PATIENT USURE MCG STRENGTH)       Current Facility-Administered Medications   Medication Dose Route Frequency Provider Last Rate Last Dose   • bupivacaine (MARCAINE) 0.25 % injection 5 mL  5 mL Injection Once Esa Miller MD, FAAN       • bupivacaine (MARCAINE) 0.25 % injection 5 mL  5 mL Injection Once Esa Miller MD FAAWILVER       • bupivacaine PF (MARCAINE) 0.75 % injection 37.5 mg  5 mL Injection Once Esa Miller MD, FAAN       • bupivacaine PF (MARCAINE) 0.75 % injection 37.5 mg  5 mL Injection Once Esa Miller MD FAAWILVER       • bupivacaine PF (MARCAINE) 0.75 % injection 37.5 mg  5 mL Injection Once Esa Miller MD, FAAN           Allergy:   Allergies   Allergen Reactions   • Darvon [Propoxyphene] Hives     darvocet   • Diamox [Acetazolamide] Rash       Review of Systems:  Review of Systems   Constitutional: Negative for chills and fever.   HENT: Negative for congestion, ear pain, hearing loss, rhinorrhea and sore throat.    Eyes: Negative for pain, discharge and redness.   Respiratory: Negative for cough, shortness of breath, wheezing and stridor.    Cardiovascular: Negative for chest pain, palpitations and leg swelling.   Gastrointestinal: Negative for abdominal pain, constipation, nausea and vomiting.   Endocrine: Negative for cold intolerance, heat intolerance and polyphagia.   Genitourinary: Negative for dysuria, flank pain, frequency and urgency.   Musculoskeletal: Negative for joint swelling, myalgias, neck pain and neck stiffness.   Skin: Negative for pallor, rash and wound.   Allergic/Immunologic: Negative for environmental allergies.  "  Neurological: Positive for headaches. Negative for dizziness, tremors, seizures, syncope, facial asymmetry, speech difficulty, weakness, light-headedness and numbness.   Hematological: Negative for adenopathy.   Psychiatric/Behavioral: Negative for confusion and hallucinations. The patient is not nervous/anxious.        Physical Exam     Vitals:    11/13/18 1453   BP: 112/78   Pulse: 79   SpO2: 98%   Height: 162.6 cm (64\")     GENERAL: Patient is pleasant, cooperative, appears to be stated age.  Body habitus is endomorphic.  SKIN AND EXTREMITIES:  No skin rashes or lesions are noted.  No cyanosis, clubbing or edema of the extremities.    HEAD:  Head is normocephalic and atraumatic.    NECK: Neck are non-tender without thyromegaly or adenopathy.  Carotic upstrokes are 1+/4.  No cranial or cervical bruits.  The neck is supple with a full range of motion.   ENT: palate elevate symmetrically, no evidence of high arch palate, tongue midline erythema in posterior pharynx, Mallampati Classification Class III   CARDIOVASCULAR:  Regular rate and rhythm with normal S1 and S2 without rub or gallop.  RESPIRATORY:  Clear to auscultation without wheezes or crackle   ABDOMEN:  Soft and non-tender, positive bowel sound without hepatosplenomegaly  BACK:  Back is straight without midline defect.    PSYCH:  Higher cortical function/mental status:  The patient is alert.  She is oriented x3 to time, place and person.  Recent and the remote memory appear normal.  The patient has a good fund of knowledge.  There is no visual or auditory hallucination or suicidal or homicidal ideation.  SPEECH:There is no gross evidence of aphasia, dysarthria or agnosia.      CRANIAL NERVES:  Pupils are 4mm, equal round reactive to light, reacting briskly to 2mm without afferent pupillary defect.  Visual fields are intact to confrontation testing.  Fundoscopic examination reveals sharp disk margins with normal vasculature.  No papilledema, hemorrhages or " exudates.  Extraocular movements are full and smooth with normal pursuits and saccades.  No nystagmus noted.  The face is symmetric. palate elevate symmetrically, Tongue midline, positive gag reflex. The remainder of the cranial nerves are intact and symmetrical.    MOTOR: Strength is 5/5 throughout with normal tone and bulk with the following exceptions, 4/5 intrinsic muscles of the hands and feet.  No involuntary movements noted.    Deep Tendon Reflexes: are 2/4 and symmetrical in the upper extremities, 2/4 and symmetrical at the knees and 1/4 and symmetrical at the Achilles tendon.  Plantar responses were down-going bilaterally.    SENSATION:  Intact to pinprick, light touch, vibration and proprioception.  Coordination:  The patient normally performs finger-nose-finger, heel-to-knee-to-shin and rapid alternating movements in symmetrical fashion.    COORDINATION AND GAIT:  The patient walks with a narrow-based gait.  Patient is able to heel-toe and tandem walk forward and backwards without difficulty.  Romberg and monopedal  Romberg are negative.    MUSCULOSKELETAL: Range of motion normal, no clubbing, cyanosis, or edema.  No joint swelling.            Studies: I have personally reviewed the following and discussed with the patient.  Results for orders placed or performed during the hospital encounter of 10/23/18   Cryptococcal AG, CSF - Cerebrospinal Fluid, Lumbar Puncture   Result Value Ref Range    Cryptococcal Antigen, CSF Negative Negative    CAP Mandated Reflex to Culture Not Indicated    Gram Stain Use tube: 3   Result Value Ref Range    Gram Stain No organisms seen     Gram Stain Rare (1+) WBCs seen    Protime-INR   Result Value Ref Range    Protime 11.6 9.3 - 12.1 Seconds    INR 1.04 0.90 - 1.10   aPTT   Result Value Ref Range    PTT 29.7 25.0 - 36.0 seconds   Platelet Count   Result Value Ref Range    Platelets 301 130 - 400 10*3/mm3   Glucose, CSF - Cerebrospinal Fluid, Lumbar Puncture   Result Value  Ref Range    Glucose, CSF 52 (L) >60 mg/dL   Protein, CSF - Cerebrospinal Fluid, Lumbar Puncture   Result Value Ref Range    Protein, Total (CSF) 42.0 <45.0 mg/dL   VDRL, CSF - Cerebrospinal Fluid, Lumbar Puncture   Result Value Ref Range    VDRL, Quantitative, CSF Non Reactive Non [place]:<1:1   Cell Count, CSF - Cerebrospinal Fluid, Lumbar Puncture   Result Value Ref Range    WBC, CSF 0 0 - 5 /mm3    RBC, CSF 2 (H) 0 - 0 /mm3    Color, CSF Colorless Colorless    Appearance, CSF Clear Clear    Tube Number, CSF 2    Non-gynecologic Cytology   Result Value Ref Range    Case Report       Medical Cytology Report                           Case: [id number]                                  Authorizing Provider:  Esa Miller MD, FAAN    Collected:           10/23/2018 11:45 AM          Ordering Location:     [facility]    Received:            10/23/2018 01:05 PM                                 XRAY                                                                         Pathologist:           Zi Rivera MD                                                     Specimen:    Lumbar Puncture                                                                            Clinical Information       Specimen collected from tube #4      Comment       See Scanned Report             Records Reviewed: I have personally reviewed her previous medical record.    Ange was seen today for results.    Diagnoses and all orders for this visit:    Pseudotumor cerebri        Treatments:    Discussion:  We'll continue patient on Lasix for now  Encouraged patient should follow up closely with her ophthalmologist  Patient will obtain surgical opinion at [place] for ventriculoperitoneal shunt placement    This Document is signed by Esa Miller MD, FAAN, FAA   November 13, 20187:30 PM

## 2018-11-14 NOTE — TELEPHONE ENCOUNTER
Patient called concerning the call she received yesterday about prescribing her something for her pain.      Please Advise and let patient know what can be called into the pharmacy.

## 2018-11-14 NOTE — PROGRESS NOTES
Subjective     Patient ID: Ange Wade is a 38 y.o. female. Patient is here for management of multiple medical problems.     Chief Complaint   Patient presents with   • Cough     X 3 days   • Chest Pain     X 3 days   • Sore Throat     X 3 days   • Nasal Congestion     X 3 days   • Med Refill     Omeprazole      Cough   This is a new problem. The current episode started in the past 7 days. The problem has been rapidly worsening. The problem occurs every few minutes. The cough is productive of purulent sputum. Associated symptoms include chest pain and a sore throat. The symptoms are aggravated by cold air and fumes. She has tried nothing for the symptoms. The treatment provided moderate relief. Her past medical history is significant for asthma and bronchitis.   Chest Pain    This is a new problem. The current episode started in the past 7 days. The problem occurs constantly. The problem has been rapidly worsening. The pain is at a severity of 4/10. The pain is moderate. The quality of the pain is described as burning. The pain radiates to the left neck. Associated symptoms include a cough. Pertinent negatives include no abdominal pain, back pain or claudication. The cough's precipitants include smoke. The cough is hacking. There is no color change associated with the cough. Nothing relieves the cough. The cough is worsened by smoke exposure. The pain is aggravated by nothing. The treatment provided moderate relief. There are no known risk factors.   Sore Throat    This is a new problem. The current episode started in the past 7 days. The pain is at a severity of 4/10. Associated symptoms include coughing. Pertinent negatives include no abdominal pain. The treatment provided moderate relief.        The following portions of the patient's history were reviewed and updated as appropriate: allergies, current medications, past family history, past medical history, past social history, past surgical history and  "problem list.    Review of Systems   HENT: Positive for sore throat.    Respiratory: Positive for cough.    Cardiovascular: Positive for chest pain. Negative for claudication.   Gastrointestinal: Negative for abdominal pain.   Musculoskeletal: Negative for back pain.       Current Outpatient Medications:   •  furosemide (LASIX) 40 MG tablet, Take 1 tablet by mouth Daily., Disp: 30 tablet, Rfl: 11  •  Omeprazole (EQ OMEPRAZOLE) 20 MG tablet delayed-release, Take 20 mg by mouth Daily., Disp: 30 each, Rfl: 11  •  promethazine (PHENERGAN) 25 MG tablet, TAKE 1 TABLET BY MOUTH EVERY SIX HOURS AS NEEDED FOR NAUSEA AND VOMITING, Disp: 120 tablet, Rfl: 0  •  tolterodine LA (DETROL LA) 4 MG 24 hr capsule, Take 1 capsule by mouth Daily., Disp: 30 capsule, Rfl: 11  •  vitamin B-12 (CYANOCOBALAMIN) 1000 MCG tablet, Take 1,000 mcg by mouth Daily. (PATIENT USURE MCG STRENGTH), Disp: , Rfl:     Current Facility-Administered Medications:   •  bupivacaine (MARCAINE) 0.25 % injection 5 mL, 5 mL, Injection, Once, Esa Miller MD, FAAN  •  bupivacaine (MARCAINE) 0.25 % injection 5 mL, 5 mL, Injection, Once, Esa Miller MD, FAAN  •  bupivacaine PF (MARCAINE) 0.75 % injection 37.5 mg, 5 mL, Injection, Once, Esa Miller MD, FAAN  •  bupivacaine PF (MARCAINE) 0.75 % injection 37.5 mg, 5 mL, Injection, Once, Esa Miller MD, FAAN  •  bupivacaine PF (MARCAINE) 0.75 % injection 37.5 mg, 5 mL, Injection, Once, Esa Miller MD, FAAN    Objective      Blood pressure 110/66, pulse 86, temperature 97.4 °F (36.3 °C), resp. rate 16, height 162.6 cm (64\"), weight 90.4 kg (199 lb 6.4 oz), SpO2 100 %, not currently breastfeeding.    Physical Exam     General Appearance:    Alert, cooperative, no distress, appears stated age   Head:    Normocephalic, without obvious abnormality, atraumatic   Eyes:    PERRL, conjunctiva/corneas clear, EOM's intact   Ears:    Normal TM's and external ear canals, both ears   Nose:   Nares normal, septum midline, " mucosa normal, no drainage   or sinus tenderness   Throat:   Lips, mucosa, and tongue normal; teeth and gums normal   Neck:   Supple, symmetrical, trachea midline, no adenopathy;        thyroid:  No enlargement/tenderness/nodules; no carotid    bruit or JVD   Back:     Symmetric, no curvature, ROM normal, no CVA tenderness   Lungs:     Clear to auscultation bilaterally, respirations unlabored   Chest wall:    No tenderness or deformity   Heart:    Regular rate and rhythm, S1 and S2 normal, no murmur,        rub or gallop   Abdomen:     Soft, non-tender, bowel sounds active all four quadrants,     no masses, no organomegaly   Extremities:   Extremities normal, atraumatic, no cyanosis or edema   Pulses:   2+ and symmetric all extremities   Skin:   Skin color, texture, turgor normal, no rashes or lesions   Lymph nodes:   Cervical, supraclavicular, and axillary nodes normal   Neurologic:   CNII-XII intact. Normal strength, sensation and reflexes       throughout      Results for orders placed or performed in visit on 11/14/18   POC Influenza A / B   Result Value Ref Range    Rapid Influenza A Ag negative     Rapid Influenza B Ag negative     Internal Control Passed Passed    Lot Number [id number]     Expiration Date 02/01/2021    POC Rapid Strep A   Result Value Ref Range    Rapid Strep A Screen Negative Negative, VALID, INVALID, Not Performed    Internal Control Passed Passed    Lot Number [id number]     Expiration Date 03/29/2021          Assessment/Plan       Ange was seen today for cough, chest pain, sore throat, nasal congestion and med refill.    Diagnoses and all orders for this visit:    Cough  -     POC Influenza A / B  -     POC Rapid Strep A    Sore throat  -     POC Influenza A / B  -     POC Rapid Strep A    Malaise  -     POC Influenza A / B    Bronchitis      Return if symptoms worsen or fail to improve.          There are no Patient Instructions on file for this visit.     Chase Correa,  MD    Assessment/Plan

## 2018-11-15 RX ORDER — OMEPRAZOLE 20 MG/1
CAPSULE, DELAYED RELEASE ORAL
Qty: 30 CAPSULE | Refills: 10 | Status: SHIPPED | OUTPATIENT
Start: 2018-11-15 | End: 2019-01-07 | Stop reason: SDUPTHER

## 2018-11-16 ENCOUNTER — OFFICE VISIT (OUTPATIENT)
Dept: INTERNAL MEDICINE | Facility: CLINIC | Age: 38
End: 2018-11-16

## 2018-11-16 VITALS
HEART RATE: 84 BPM | BODY MASS INDEX: 33.63 KG/M2 | SYSTOLIC BLOOD PRESSURE: 104 MMHG | RESPIRATION RATE: 16 BRPM | HEIGHT: 64 IN | DIASTOLIC BLOOD PRESSURE: 79 MMHG | OXYGEN SATURATION: 99 % | WEIGHT: 197 LBS | TEMPERATURE: 98.3 F

## 2018-11-16 DIAGNOSIS — J40 BRONCHITIS: Primary | ICD-10-CM

## 2018-11-16 PROCEDURE — 99213 OFFICE O/P EST LOW 20 MIN: CPT | Performed by: INTERNAL MEDICINE

## 2018-11-16 RX ORDER — AZITHROMYCIN 250 MG/1
TABLET, FILM COATED ORAL
Qty: 6 TABLET | Refills: 0 | Status: SHIPPED | OUTPATIENT
Start: 2018-11-16 | End: 2019-01-07

## 2018-11-16 NOTE — PROGRESS NOTES
Subjective     Patient ID: Ange Wade is a 38 y.o. female. Patient is here for management of multiple medical problems.     Chief Complaint   Patient presents with   • Allergic Reaction     patient states she is feeling a little better in her chest, not hurting as much but she states she vomits each time she takes the doxycycline she vomits within 30 minutes     History of Present Illness     Recent dx of bronchchitis.  A little better.              The follow      ing portions of the patient's history were reviewed and updated as appropriate: allergies, current medications, past family history, past medical history, past social history, past surgical history and problem list.    Review of Systems   Constitutional: Negative for chills and diaphoresis.   HENT: Negative for congestion, facial swelling, nosebleeds and postnasal drip.    Respiratory: Positive for cough, chest tightness, shortness of breath, wheezing and stridor.    Cardiovascular: Negative for chest pain and leg swelling.   Genitourinary: Negative for vaginal bleeding and vaginal discharge.   Psychiatric/Behavioral: Negative for dysphoric mood and hallucinations. The patient is not nervous/anxious and is not hyperactive.    All other systems reviewed and are negative.      Current Outpatient Medications:   •  furosemide (LASIX) 40 MG tablet, Take 1 tablet by mouth Daily., Disp: 30 tablet, Rfl: 11  •  Omeprazole (EQ OMEPRAZOLE) 20 MG tablet delayed-release, Take 20 mg by mouth Daily., Disp: 30 each, Rfl: 11  •  omeprazole (priLOSEC) 20 MG capsule, TAKE 1 CAPSULE BY MOUTH ONE TIME A DAY, Disp: 30 capsule, Rfl: 10  •  promethazine (PHENERGAN) 25 MG tablet, TAKE 1 TABLET BY MOUTH EVERY SIX HOURS AS NEEDED FOR NAUSEA AND VOMITING, Disp: 120 tablet, Rfl: 0  •  tolterodine LA (DETROL LA) 4 MG 24 hr capsule, Take 1 capsule by mouth Daily., Disp: 30 capsule, Rfl: 11  •  vitamin B-12 (CYANOCOBALAMIN) 1000 MCG tablet, Take 1,000 mcg by mouth Daily. (PATIENT  "USURE MCG STRENGTH), Disp: , Rfl:   •  azithromycin (ZITHROMAX) 250 MG tablet, Take 2 tablets the first day, then 1 tablet daily for 4 days., Disp: 6 tablet, Rfl: 0    Current Facility-Administered Medications:   •  bupivacaine (MARCAINE) 0.25 % injection 5 mL, 5 mL, Injection, Once, Esa Miller MD, FAAN  •  bupivacaine (MARCAINE) 0.25 % injection 5 mL, 5 mL, Injection, Once, Esa Miller MD, FAAN  •  bupivacaine PF (MARCAINE) 0.75 % injection 37.5 mg, 5 mL, Injection, Once, Esa Miller MD, FAAN  •  bupivacaine PF (MARCAINE) 0.75 % injection 37.5 mg, 5 mL, Injection, Once, Esa Miller MD, FAAN  •  bupivacaine PF (MARCAINE) 0.75 % injection 37.5 mg, 5 mL, Injection, Once, Esa Miller MD, FAAN    Objective      Blood pressure 104/79, pulse 84, temperature 98.3 °F (36.8 °C), temperature source Temporal, resp. rate 16, height 162.6 cm (64\"), weight 89.4 kg (197 lb), SpO2 99 %, not currently breastfeeding.    Physical Exam     General Appearance:    Alert, cooperative, no distress, appears stated age   Head:    Normocephalic, without obvious abnormality, atraumatic   Eyes:    PERRL, conjunctiva/corneas clear, EOM's intact   Ears:    Normal TM's and external ear canals, both ears   Nose:   Nares normal, septum midline, mucosa normal, no drainage   or sinus tenderness   Throat:   Lips, mucosa, and tongue normal; teeth and gums normal   Neck:   Supple, symmetrical, trachea midline, no adenopathy;        thyroid:  No enlargement/tenderness/nodules; no carotid    bruit or JVD   Back:     Symmetric, no curvature, ROM normal, no CVA tenderness   Lungs:     wheezing to auscultation bilaterally, respirations unlabored   Chest wall:    No tenderness or deformity   Heart:    Regular rate and rhythm, S1 and S2 normal, no murmur,        rub or gallop   Abdomen:     Soft, non-tender, bowel sounds active all four quadrants,     no masses, no organomegaly   Extremities:   Extremities normal, atraumatic, no cyanosis or " edema   Pulses:   2+ and symmetric all extremities   Skin:   Skin color, texture, turgor normal, no rashes or lesions   Lymph nodes:   Cervical, supraclavicular, and axillary nodes normal   Neurologic:   CNII-XII intact. Normal strength, sensation and reflexes       throughout      Results for orders placed or performed in visit on 11/14/18   POC Influenza A / B   Result Value Ref Range    Rapid Influenza A Ag negative     Rapid Influenza B Ag negative     Internal Control Passed Passed    Lot Number [id number]     Expiration Date 02/01/2021    POC Rapid Strep A   Result Value Ref Range    Rapid Strep A Screen Negative Negative, VALID, INVALID, Not Performed    Internal Control Passed Passed    Lot Number [id number]     Expiration Date 03/29/2021          Assessment/Plan       Not tolerating doxy. Change to azithro.           Ange was seen today for allergic reaction.    Diagnoses and all orders for this visit:    Bronchitis    Other orders  -     azithromycin (ZITHROMAX) 250 MG tablet; Take 2 tablets the first day, then 1 tablet daily for 4 days.      Return if symptoms worsen or fail to improve.          There are no Patient Instructions on file for this visit.     Chase Correa MD    Assessment/Plan

## 2018-11-19 RX ORDER — AMITRIPTYLINE HYDROCHLORIDE 25 MG/1
25 TABLET, FILM COATED ORAL NIGHTLY
Qty: 30 TABLET | Refills: 3 | Status: SHIPPED | OUTPATIENT
Start: 2018-11-19

## 2018-12-11 ENCOUNTER — TELEPHONE (OUTPATIENT)
Dept: INTERNAL MEDICINE | Facility: CLINIC | Age: 38
End: 2018-12-11

## 2018-12-11 NOTE — TELEPHONE ENCOUNTER
Patient requesting call back from nurse or doctor to discuss her recent visit with the pain doctor. Please advise. Confirmed call back is [phone]

## 2018-12-11 NOTE — TELEPHONE ENCOUNTER
Dr. Correa, I called Ange she states she would prefer to talk to you, she has a lot questions regarding her visit with the neurologist.

## 2019-01-07 ENCOUNTER — PROCEDURE VISIT (OUTPATIENT)
Dept: NEUROLOGY | Facility: CLINIC | Age: 39
End: 2019-01-07

## 2019-01-07 VITALS
HEART RATE: 80 BPM | BODY MASS INDEX: 33.81 KG/M2 | HEIGHT: 64 IN | DIASTOLIC BLOOD PRESSURE: 76 MMHG | SYSTOLIC BLOOD PRESSURE: 110 MMHG | OXYGEN SATURATION: 99 %

## 2019-01-07 DIAGNOSIS — M79.18 MYOFASCIAL MUSCLE PAIN: ICD-10-CM

## 2019-01-07 DIAGNOSIS — M54.2 NECK PAIN: ICD-10-CM

## 2019-01-07 DIAGNOSIS — G44.209 TENSION HEADACHE: Primary | ICD-10-CM

## 2019-01-07 PROCEDURE — 20553 NJX 1/MLT TRIGGER POINTS 3/>: CPT | Performed by: PSYCHIATRY & NEUROLOGY

## 2019-01-07 RX ORDER — BUPIVACAINE HYDROCHLORIDE 5 MG/ML
5 INJECTION, SOLUTION PERINEURAL ONCE
Status: SHIPPED | OUTPATIENT
Start: 2019-01-07

## 2019-01-07 RX ORDER — METHYLPREDNISOLONE ACETATE 40 MG/ML
200 INJECTION, SUSPENSION INTRA-ARTICULAR; INTRALESIONAL; INTRAMUSCULAR; SOFT TISSUE ONCE
Status: COMPLETED | OUTPATIENT
Start: 2019-01-07 | End: 2019-01-07

## 2019-01-07 RX ORDER — BUPIVACAINE HYDROCHLORIDE 5 MG/ML
5 INJECTION, SOLUTION PERINEURAL ONCE
Status: DISCONTINUED | OUTPATIENT
Start: 2019-01-07 | End: 2019-01-07

## 2019-01-07 RX ORDER — METHYLPREDNISOLONE ACETATE 40 MG/ML
200 INJECTION, SUSPENSION INTRA-ARTICULAR; INTRALESIONAL; INTRAMUSCULAR; SOFT TISSUE ONCE
Status: SHIPPED | OUTPATIENT
Start: 2019-01-07

## 2019-01-07 RX ADMIN — METHYLPREDNISOLONE ACETATE 200 MG: 40 INJECTION, SUSPENSION INTRA-ARTICULAR; INTRALESIONAL; INTRAMUSCULAR; SOFT TISSUE at 23:01

## 2019-01-08 NOTE — PROGRESS NOTES
Procedure note    Procedure: Trigger point injection    Indication:  Patient is a pleasant 37-year-old with tension headache myofascial pain and neck pain.  Patient has benefited from previous trigger point injection after failing Imitrex, Zomig, Zofran, Phenergan, amitriptyline and Topamax.  Patient has moved to [place].  She traveled from [place] for requesting repeat trigger point injection since she has benefited from previous trigger point injection.    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 Risks and benefits of the Trigger point injection procedure have been explained to the patient.  Patient has no contraindications such as bleed diathesis, recent acute trauma at the muscle sites, anesthetic allergy or anticoagulation.  Mechanism of trigger point injection has been explained to patient.     Procedure Note:  1.         Patient was positioned comfortably  2.         Before injections are started, 10 Trigger Points sites are identified throughout the bilateral upper trapezius muscle, levator scapulae, and erector spinae muscles.    3.         Overlying skin area was cleaned with Alcohol swab                                                                                                              4.         Before injection, trigger points sites were palpated for local twitch and referred pain to confirms placement                         5.         Local anesthetic was mixed with Depo-Medrol (5 cc of Marcaine 0.5% mixed with 5 cc of Depo-Medrol at 40 mg/ml - for a total of 10 cc)  6.         30 gauge ½ inch needle was utilized to ensure patient comfort          7.         I started with the most tender spot in above mentioned Trigger Points   1.   Localize most tender spot within taut muscle-fibers  2.   Fix tender spot between fingers (1-2 cm in size)   1.   Prevents from rolling away from needle  2.   Controls subcutaneous bleeding  3.   Before injection,  patient was instructed of possible pain on injection  4.   Direct needle at 30 degree angle off skin   8.         Insert needle into skin 1-2 cm from Trigger Point   9.         Advance needle into Trigger Point   10.       Use 1cc anesthetic at each Trigger Points identified in step #2  11.       Redirect needle and reinject                                                                                              1.   Withdraw needle to subcutaneous tissue  2.   Redirect needle into adjacent tender areas  3.   Repeat until local twitch or tautness resolves  12.   After each of the 10 injections I held direct pressure at injection site for 2 minutes to prevents hematoma formation  13.   The same procedure was repeated for other Tender Points located in the upper trapezius muscle, levator scapulae and erector spinae bilaterally  14.   Patient was instructed to gently stretch injected areas to full active range of motion in all directions and was instructed to repeat range of motion three times after injection  15.   Post-Procedure patient was instructed to avoid over-using injected area for 3-4 days   1.   Maintain active range of motion of injected muscle  2.   Patient applies ice to injected areas for a few hours  3.   Anticipate post-injection soreness for 3-4 days  There was no evidence of complications from injection was noted such as local Skin Infection  at injection site or local hematoma at injection site        Marcaine 0.5%  NDC #0409-116 3-1 8  Lots number: [id number]  Expiration date August 1, 2020    Depo-Medrol  NDC #0009-028 0-02  Lots number: [id number]  Expiration date February 2021    Esa Miller MD, FAAN  01/07/2019

## 2019-01-16 ENCOUNTER — OFFICE VISIT (OUTPATIENT)
Dept: INTERNAL MEDICINE | Facility: CLINIC | Age: 39
End: 2019-01-16

## 2019-01-16 VITALS
BODY MASS INDEX: 34.83 KG/M2 | OXYGEN SATURATION: 100 % | WEIGHT: 204 LBS | HEIGHT: 64 IN | HEART RATE: 95 BPM | SYSTOLIC BLOOD PRESSURE: 119 MMHG | DIASTOLIC BLOOD PRESSURE: 80 MMHG | TEMPERATURE: 97.6 F | RESPIRATION RATE: 16 BRPM

## 2019-01-16 DIAGNOSIS — H92.02 LEFT EAR PAIN: Primary | ICD-10-CM

## 2019-01-16 PROCEDURE — 90471 IMMUNIZATION ADMIN: CPT | Performed by: INTERNAL MEDICINE

## 2019-01-16 PROCEDURE — 99213 OFFICE O/P EST LOW 20 MIN: CPT | Performed by: INTERNAL MEDICINE

## 2019-01-16 PROCEDURE — 90632 HEPA VACCINE ADULT IM: CPT | Performed by: INTERNAL MEDICINE

## 2019-01-16 NOTE — PROGRESS NOTES
Subjective     Patient ID: Ange Wade is a 38 y.o. female. Patient is here for management of multiple medical problems.     Chief Complaint   Patient presents with   • [place]     left ear pain x 1 week, patient states she is still having pain on the left side of her head, and she is continuing to have pain in her eyes.     History of Present Illness     Pt has mixxed medcial DX and treatment option.   Will get a 3rd opinion in Feb.  Had lumbar puncture with no improvement in left eye throbbing.     Left ear pain and hearing loss. X 1 week with pain.  Loss of hearing x 1 month.        occ sleeps on left. predominantly on right side.         The following portions of the patient's history were reviewed and updated as appropriate: allergies, current medications, past family history, past medical history, past social history, past surgical history and problem list.    Review of Systems   HENT: Positive for congestion, ear pain and hearing loss.    Psychiatric/Behavioral: Negative for decreased concentration, dysphoric mood and hallucinations. The patient is not nervous/anxious and is not hyperactive.    All other systems reviewed and are negative.      Current Outpatient Medications:   •  amitriptyline (ELAVIL) 25 MG tablet, Take 1 tablet by mouth Every Night., Disp: 30 tablet, Rfl: 3  •  furosemide (LASIX) 40 MG tablet, Take 1 tablet by mouth Daily., Disp: 30 tablet, Rfl: 11  •  Omeprazole (EQ OMEPRAZOLE) 20 MG tablet delayed-release, Take 20 mg by mouth Daily., Disp: 30 each, Rfl: 11  •  promethazine (PHENERGAN) 25 MG tablet, TAKE 1 TABLET BY MOUTH EVERY SIX HOURS AS NEEDED FOR NAUSEA AND VOMITING, Disp: 120 tablet, Rfl: 0  •  tolterodine LA (DETROL LA) 4 MG 24 hr capsule, Take 1 capsule by mouth Daily., Disp: 30 capsule, Rfl: 11  •  vitamin B-12 (CYANOCOBALAMIN) 1000 MCG tablet, Take 1,000 mcg by mouth Daily. (PATIENT USURE MCG STRENGTH), Disp: , Rfl:     Current Facility-Administered Medications:   •   "bupivacaine (MARCAINE) 0.5 % injection 5 mL, 5 mL, Injection, Once, Esa Miller MD, FAAN  •  methylPREDNISolone acetate (DEPO-medrol) injection 200 mg, 200 mg, Intramuscular, Once, Esa Miller MD, FAAN    Objective      Blood pressure 119/80, pulse 95, temperature 97.6 °F (36.4 °C), temperature source Oral, resp. rate 16, height 162.6 cm (64\"), weight 92.5 kg (204 lb), SpO2 100 %, not currently breastfeeding.    Physical Exam     General Appearance:    Alert, cooperative, no distress, appears stated age   Head:    Normocephalic, without obvious abnormality, atraumatic   Eyes:   ttp of external ear. ttp eustation tube. Cone of light normal.   PERRL, conjunctiva/corneas clear, EOM's intact   Ears:    Normal TM's and external ear canals, both ears   Nose:   Nares normal, septum midline, mucosa normal, no drainage   or sinus tenderness   Throat:   Lips, mucosa, and tongue normal; teeth and gums normal   Neck:   Supple, symmetrical, trachea midline, no adenopathy;        thyroid:  No enlargement/tenderness/nodules; no carotid    bruit or JVD   Back:     Symmetric, no curvature, ROM normal, no CVA tenderness   Lungs:     Clear to auscultation bilaterally, respirations unlabored   Chest wall:    No tenderness or deformity   Heart:    Regular rate and rhythm, S1 and S2 normal, no murmur,        rub or gallop   Abdomen:     Soft, non-tender, bowel sounds active all four quadrants,     no masses, no organomegaly   Extremities:   Extremities normal, atraumatic, no cyanosis or edema   Pulses:   2+ and symmetric all extremities   Skin:   Skin color, texture, turgor normal, no rashes or lesions   Lymph nodes:   Cervical, supraclavicular, and axillary nodes normal   Neurologic:   CNII-XII intact. Normal strength, sensation and reflexes       throughout      Results for orders placed or performed in visit on 11/14/18   POC Influenza A / B   Result Value Ref Range    Rapid Influenza A Ag negative     Rapid Influenza B Ag " negative     Internal Control Passed Passed    Lot Number [id number]     Expiration Date 02/01/2021    POC Rapid Strep A   Result Value Ref Range    Rapid Strep A Screen Negative Negative, VALID, INVALID, Not Performed    Internal Control Passed Passed    Lot Number [id number]     Expiration Date 03/29/2021          Assessment/Plan       Ange was seen today for earpain.    Diagnoses and all orders for this visit:    Left ear pain  -     Ambulatory Referral to ENT (Otolaryngology)    Other orders  -     Hepatitis A Vaccine Adult IM      Return if symptoms worsen or fail to improve.          There are no Patient Instructions on file for this visit.     Chase Correa MD    Assessment/Plan

## 2019-02-06 DIAGNOSIS — R10.13 EPIGASTRIC ABDOMINAL PAIN: ICD-10-CM

## 2019-02-06 DIAGNOSIS — R11.0 NAUSEA: ICD-10-CM

## 2019-02-06 RX ORDER — PROMETHAZINE HYDROCHLORIDE 25 MG/1
TABLET ORAL
Qty: 120 TABLET | Refills: 0 | Status: SHIPPED | OUTPATIENT
Start: 2019-02-06 | End: 2019-02-23 | Stop reason: SDUPTHER

## 2019-02-21 ENCOUNTER — TELEPHONE (OUTPATIENT)
Dept: INTERNAL MEDICINE | Facility: CLINIC | Age: 39
End: 2019-02-21

## 2019-02-21 NOTE — TELEPHONE ENCOUNTER
If it is working stay on it.  If not working discuss with Neurosurgery.    I don't have a great answer.

## 2019-02-21 NOTE — TELEPHONE ENCOUNTER
Patient has been notified, she states she has a lot of questions to ask Dr. Correa, she will call back to schedule an appointment.

## 2019-02-23 DIAGNOSIS — R11.0 NAUSEA: ICD-10-CM

## 2019-02-23 DIAGNOSIS — R10.13 EPIGASTRIC ABDOMINAL PAIN: ICD-10-CM

## 2019-02-25 RX ORDER — PROMETHAZINE HYDROCHLORIDE 25 MG/1
TABLET ORAL
Qty: 120 TABLET | Refills: 0 | Status: SHIPPED | OUTPATIENT
Start: 2019-02-25 | End: 2019-04-06 | Stop reason: SDUPTHER

## 2019-03-04 ENCOUNTER — OFFICE VISIT (OUTPATIENT)
Dept: INTERNAL MEDICINE | Facility: CLINIC | Age: 39
End: 2019-03-04

## 2019-03-04 VITALS
BODY MASS INDEX: 35.9 KG/M2 | WEIGHT: 209.12 LBS | DIASTOLIC BLOOD PRESSURE: 76 MMHG | HEART RATE: 93 BPM | OXYGEN SATURATION: 100 % | TEMPERATURE: 98 F | SYSTOLIC BLOOD PRESSURE: 118 MMHG | RESPIRATION RATE: 16 BRPM

## 2019-03-04 DIAGNOSIS — J06.9 UPPER RESPIRATORY TRACT INFECTION, UNSPECIFIED TYPE: ICD-10-CM

## 2019-03-04 DIAGNOSIS — G93.2 PSEUDOTUMOR CEREBRI: Primary | ICD-10-CM

## 2019-03-04 PROCEDURE — 99214 OFFICE O/P EST MOD 30 MIN: CPT | Performed by: INTERNAL MEDICINE

## 2019-03-04 RX ORDER — AMOXICILLIN AND CLAVULANATE POTASSIUM 875; 125 MG/1; MG/1
1 TABLET, FILM COATED ORAL EVERY 12 HOURS SCHEDULED
Qty: 20 TABLET | Refills: 0 | Status: SHIPPED | OUTPATIENT
Start: 2019-03-04 | End: 2019-04-29

## 2019-03-04 NOTE — PROGRESS NOTES
Subjective     Patient ID: Ange Wade is a 38 y.o. female. Patient is here for management of multiple medical problems.     Chief Complaint   Patient presents with   • Sinusitis     patient having sinus congestion, headaches, right ear pain, x 2 weeks   • Eye Pain     patient having increased eye pain     History of Present Illness      Pt stopped Lasix.  Eye pain and HA sig  Worse after 3 weeks.        Pt with sinus congestion x 2 weeks. + sick contacts.  No fever chills. Yellow green nasal d/c. otc not helping.          Pt see Neurosurgeon in [place].  Told she doesn't have Pseudotumor.   Opening pressure on lasix x 6 month was 20 mm/hg.              The following portions of the patient's history were reviewed and updated as appropriate: allergies, current medications, past family history, past medical history, past social history, past surgical history and problem list.    Review of Systems   Constitutional: Positive for fatigue.   Respiratory: Negative for shortness of breath and stridor.    Psychiatric/Behavioral: Negative for decreased concentration, dysphoric mood and sleep disturbance.   All other systems reviewed and are negative.      Current Outpatient Medications:   •  amitriptyline (ELAVIL) 25 MG tablet, Take 1 tablet by mouth Every Night., Disp: 30 tablet, Rfl: 3  •  furosemide (LASIX) 40 MG tablet, Take 1 tablet by mouth Daily., Disp: 30 tablet, Rfl: 11  •  Omeprazole (EQ OMEPRAZOLE) 20 MG tablet delayed-release, Take 20 mg by mouth Daily., Disp: 30 each, Rfl: 11  •  promethazine (PHENERGAN) 25 MG tablet, TAKE 1 TABLET BY MOUTH EVERY SIX HOURS AS NEEDED FOR NAUSEA AND VOMITING, Disp: 120 tablet, Rfl: 0  •  tolterodine LA (DETROL LA) 4 MG 24 hr capsule, Take 1 capsule by mouth Daily., Disp: 30 capsule, Rfl: 11  •  vitamin B-12 (CYANOCOBALAMIN) 1000 MCG tablet, Take 1,000 mcg by mouth Daily. (PATIENT USURE MCG STRENGTH), Disp: , Rfl:   •  amoxicillin-clavulanate (AUGMENTIN) 875-125 MG per tablet,  Take 1 tablet by mouth Every 12 (Twelve) Hours., Disp: 20 tablet, Rfl: 0    Current Facility-Administered Medications:   •  bupivacaine (MARCAINE) 0.5 % injection 5 mL, 5 mL, Injection, Once, Esa Miller MD, FAAN  •  methylPREDNISolone acetate (DEPO-medrol) injection 200 mg, 200 mg, Intramuscular, Once, Esa Miller MD, FAAN    Objective      Blood pressure 118/76, pulse 93, temperature 98 °F (36.7 °C), temperature source Oral, resp. rate 16, weight 94.9 kg (209 lb 1.9 oz), SpO2 100 %, not currently breastfeeding.    Physical Exam     General Appearance:    Alert, cooperative, no distress, appears stated age   Head:    Normocephalic, without obvious abnormality, atraumatic   Eyes:    PERRL, conjunctiva/corneas clear, EOM's intact   Ears:    Normal TM's and external ear canals, both ears   Nose:   Nares normal, septum midline, mucosa normal, no drainage   + sinus tenderness   Throat:   Lips, mucosa, and tongue normal; teeth and gums normal   Neck:   Supple, symmetrical, trachea midline, no adenopathy;        thyroid:  No enlargement/tenderness/nodules; no carotid    bruit or JVD   Back:     Symmetric, no curvature, ROM normal, no CVA tenderness   Lungs:     Clear to auscultation bilaterally, respirations unlabored   Chest wall:    No tenderness or deformity   Heart:    Regular rate and rhythm, S1 and S2 normal, no murmur,        rub or gallop   Abdomen:     Soft, non-tender, bowel sounds active all four quadrants,     no masses, no organomegaly   Extremities:   Extremities normal, atraumatic, no cyanosis or edema   Pulses:   2+ and symmetric all extremities   Skin:   Skin color, texture, turgor normal, no rashes or lesions   Lymph nodes:   Cervical, supraclavicular, and axillary nodes normal   Neurologic:   CNII-XII intact. Normal strength, sensation and reflexes       throughout      Results for orders placed or performed in visit on 11/14/18   POC Influenza A / B   Result Value Ref Range    Rapid Influenza A  Ag negative     Rapid Influenza B Ag negative     Internal Control Passed Passed    Lot Number [id number]     Expiration Date 02/01/2021    POC Rapid Strep A   Result Value Ref Range    Rapid Strep A Screen Negative Negative, VALID, INVALID, Not Performed    Internal Control Passed Passed    Lot Number [id number]     Expiration Date 03/29/2021          Assessment/Plan       Ange was seen today for sinusitis and eye pain.    Diagnoses and all orders for this visit:    Pseudotumor cerebri  -     FL Guided Pain Mgt (rad reading)    Upper respiratory tract infection, unspecified type  -     amoxicillin-clavulanate (AUGMENTIN) 875-125 MG per tablet; Take 1 tablet by mouth Every 12 (Twelve) Hours.      Return if symptoms worsen or fail to improve.          There are no Patient Instructions on file for this visit.     Chase Correa MD    Assessment/Plan

## 2019-03-07 ENCOUNTER — TELEPHONE (OUTPATIENT)
Dept: INTERNAL MEDICINE | Facility: CLINIC | Age: 39
End: 2019-03-07

## 2019-03-07 NOTE — TELEPHONE ENCOUNTER
Patient called and states she started the amoxcillin and now she is broke out [place] [facility] [place]. She wants to speak with nurse. She has stopped taking this.

## 2019-04-06 DIAGNOSIS — R10.13 EPIGASTRIC ABDOMINAL PAIN: ICD-10-CM

## 2019-04-06 DIAGNOSIS — R11.0 NAUSEA: ICD-10-CM

## 2019-04-08 RX ORDER — PROMETHAZINE HYDROCHLORIDE 25 MG/1
TABLET ORAL
Qty: 120 TABLET | Refills: 0 | Status: SHIPPED | OUTPATIENT
Start: 2019-04-08 | End: 2019-06-13 | Stop reason: SDUPTHER

## 2019-04-10 ENCOUNTER — OFFICE VISIT (OUTPATIENT)
Dept: NEUROLOGY | Facility: CLINIC | Age: 39
End: 2019-04-10

## 2019-04-10 VITALS
OXYGEN SATURATION: 98 % | BODY MASS INDEX: 35.9 KG/M2 | HEART RATE: 124 BPM | HEIGHT: 64 IN | DIASTOLIC BLOOD PRESSURE: 84 MMHG | SYSTOLIC BLOOD PRESSURE: 122 MMHG

## 2019-04-10 DIAGNOSIS — H53.8 BLURRY VISION, BILATERAL: ICD-10-CM

## 2019-04-10 DIAGNOSIS — G93.2 PSEUDOTUMOR CEREBRI SYNDROME: Primary | ICD-10-CM

## 2019-04-10 PROCEDURE — 99214 OFFICE O/P EST MOD 30 MIN: CPT | Performed by: PSYCHIATRY & NEUROLOGY

## 2019-04-11 NOTE — PROGRESS NOTES
[facility] NEUROLOGY [place] CONSULTATION   History of Present Illness     Date: 4/10/2019    Patient Identification  Ange Wade is a 38 y.o. female.    Patient information was obtained from patient and relative(s).  History/Exam limitations: none.    CONSULTATION requested by: Chase Correa MD    Chief Complaint   Pseudotumor cerebri (Pt in office today for follow up, discuss order for LP )      History of Present Illness   Patient is a pleasant 38-year-old referred to [facility] neurology [facility] for evaluation of pseudotumor cerebri.  Interval history since last visit patient moved to [place] to be close to her family.  While she was in [place] she was seen by an ophthalmologist who reported that her eye finding and most consistent with pseudotumor cerebri.  Patient was seen by a neurologist in [place] because of the worsening of headache and blurry vision.  However the other neurologist did not feel that patient have pseudotumor cerebri and he is not going to pursue lumbar puncture.  Patient subsequently returned back to [place] for repeat lumbar puncture since her last lumbar puncture has benefited patient.      PMH:   Past Medical History:   Diagnosis Date   • Abnormal ECG 1988?    [facility]   • Abnormal Pap smear of cervix 1998    Precancerous cells   • Anemia 1986   • Body piercing     EARS   • DVT (deep vein thrombosis) in pregnancy (CMS/[facility])     REPORTS 15 YEAR AGO   • Fibroid uterus    • History of blood clots     after c section    • History of CT scan    • History of MRI    • History of Papanicolaou smear of cervix 06/16/2015    NORMAL    • Migraine    • Nerve damage     Bilateral eyes   • Ovarian cyst 5-2015    Had uterus removed 8-   • Pelvic adhesive disease    • Phonophobia    • PMS (premenstrual syndrome) 07-91   • Preeclampsia 1-    During my 1st pregnacy   • Urinary tract infection 1-2002    Only had during last pregnacy   • Varicella    • Vasovagal  syncope    • Wears dentures     FULL UPPER PLATE, INSTRUCTED NO ADHESIVES DOS       Past Surgical History:   Past Surgical History:   Procedure Laterality Date   • APPENDECTOMY     • BLADDER REPAIR  2015    REPAIR OF INCIDENTAL CYSTOTOMY (DR ROGER CRAWFORD)   •  SECTION     •  SECTION     • COLONOSCOPY N/A 2017    Procedure: COLONOSCOPY WITH BIOPSY;  Surgeon: Jaylene Doherty MD;  Location: [facility] ENDOSCOPY;  Service:    • COLONOSCOPY N/A 10/20/2017    Procedure: COLONOSCOPY;  Surgeon: Jaylene Doherty MD;  Location: [facility] ENDOSCOPY;  Service:    • DIAGNOSTIC LAPAROSCOPY  2015    DR ROGER CRAWFORD   • ENDOSCOPY N/A 10/20/2017    Procedure: ESOPHAGOGASTRODUODENOSCOPY WITH BIOPSY;  Surgeon: Jaylene Doherty MD;  Location: [facility] ENDOSCOPY;  Service:    • EXPLORATORY LAPAROTOMY N/A 2017    Procedure: LAPAROTOMY EXPLORATORY with bilateral salpingo-oophorectomy, extensive lysis of adhesions appendectomy, cystoscopy;  Surgeon: Roger Crawford MD;  Location: [facility] OR;  Service:    • HYSTERECTOMY  2016    LUPE (DR ROGER CRAWFORD), REPORTS LEFT OVARY INTACT   • MOUTH SURGERY     • TOE SURGERY Right 1991    GREAT TOE   • TONSILLECTOMY  82 or 83   • TUBAL ABDOMINAL LIGATION     • WISDOM TOOTH EXTRACTION  3-2003       Family Hisotry:   Family History   Problem Relation Age of Onset   • Mental illness Other    • Cancer Other    • Diabetes Other    • Hypertension Other    • Heart disease Other    • Stroke Other    • Breast cancer Maternal Grandmother    • Ovarian cancer Maternal Grandmother    • Stroke Maternal Grandmother    • Diabetes Maternal Grandmother    • Breast cancer Maternal Aunt    • Stroke Paternal Grandmother    • Diabetes Paternal Grandmother    • Heart disease Mother    • COPD Mother    • Thyroid disease Mother        Social History:   Social History     Socioeconomic History   • Marital status:      Spouse name: Not on file   • Number of children: Not on file   •  Years of education: Not on file   • Highest education level: Not on file   Tobacco Use   • Smoking status: Former Smoker     Packs/day: 2.00     Years: 3.00     Pack years: 6.00     Types: Cigarettes     Last attempt to quit: 1996     Years since quittin.1   • Smokeless tobacco: Never Used   Substance and Sexual Activity   • Alcohol use: No   • Drug use: No   • Sexual activity: Defer       Medications:   Current Outpatient Medications   Medication Sig Dispense Refill   • amitriptyline (ELAVIL) 25 MG tablet Take 1 tablet by mouth Every Night. 30 tablet 3   • amoxicillin-clavulanate (AUGMENTIN) 875-125 MG per tablet Take 1 tablet by mouth Every 12 (Twelve) Hours. 20 tablet 0   • Omeprazole (EQ OMEPRAZOLE) 20 MG tablet delayed-release Take 20 mg by mouth Daily. 30 each 11   • promethazine (PHENERGAN) 25 MG tablet TAKE 1 TABLET BY MOUTH EVERY SIX HOURS AS NEEDED FOR NAUSEA AND VOMITING 120 tablet 0   • tolterodine LA (DETROL LA) 4 MG 24 hr capsule Take 1 capsule by mouth Daily. 30 capsule 11   • vitamin B-12 (CYANOCOBALAMIN) 1000 MCG tablet Take 1,000 mcg by mouth Daily. (PATIENT USURE MCG STRENGTH)     • furosemide (LASIX) 40 MG tablet Take 1 tablet by mouth Daily. 30 tablet 11     Current Facility-Administered Medications   Medication Dose Route Frequency Provider Last Rate Last Dose   • bupivacaine (MARCAINE) 0.5 % injection 5 mL  5 mL Injection Once Esa Miller MD, FAAN       • methylPREDNISolone acetate (DEPO-medrol) injection 200 mg  200 mg Intramuscular Once Esa Miller MD, FAAN           Allergy:   Allergies   Allergen Reactions   • Darvon [Propoxyphene] Hives     darvocet   • Doxycycline Nausea And Vomiting   • Augmentin [Amoxicillin-Pot Clavulanate] Rash   • Diamox [Acetazolamide] Rash       Review of Systems:  Review of Systems   Constitutional: Negative for chills and fever.   HENT: Negative for congestion, ear pain, hearing loss, rhinorrhea and sore throat.    Eyes: Positive for visual  "disturbance. Negative for pain, discharge and redness.   Respiratory: Negative for cough, shortness of breath, wheezing and stridor.    Cardiovascular: Negative for chest pain, palpitations and leg swelling.   Gastrointestinal: Negative for abdominal pain, constipation, nausea and vomiting.   Endocrine: Negative for cold intolerance, heat intolerance and polyphagia.   Genitourinary: Negative for dysuria, flank pain, frequency and urgency.   Musculoskeletal: Negative for joint swelling, myalgias, neck pain and neck stiffness.   Skin: Negative for pallor, rash and wound.   Allergic/Immunologic: Negative for environmental allergies.   Neurological: Positive for headaches. Negative for dizziness, tremors, seizures, syncope, facial asymmetry, speech difficulty, weakness, light-headedness and numbness.   Hematological: Negative for adenopathy.   Psychiatric/Behavioral: Negative for confusion and hallucinations. The patient is not nervous/anxious.        Physical Exam     Vitals:    04/10/19 1604   BP: 122/84   Pulse: (!) 124   SpO2: 98%   Height: 162.6 cm (64\")     GENERAL: Patient is pleasant, cooperative, appears to be stated age.  Body habitus is endomorphic.  SKIN AND EXTREMITIES:  No skin rashes or lesions are noted.  No cyanosis, clubbing or edema of the extremities.    HEAD:  Head is normocephalic and atraumatic.    NECK: Nontender without thyromegaly or adenopathy.  Carotid upstrokes are 1+/4.  No cranial or cervical bruits.  The neck is supple with a full range of motion.   ENT: Palate elevates symmetrically.  No evidence of high arch palate.  Tongue midline.  No erythema in posterior pharynx.  Mallampati Classification Class III   CARDIOVASCULAR:  Regular rate and rhythm with normal S1 and S2 without rub or gallop.  RESPIRATORY:  Clear to auscultation without wheezes or crackle   ABDOMEN:  Soft and nontender, positive bowel sound without hepatosplenomegaly  BACK:  Back is straight without midline defect.  "   PSYCH:  Higher cortical function/mental status:  The patient is alert.  The patient is oriented x3 to time, place and person.  Recent and the remote memory appear normal.  The patient has a good fund of knowledge.  There is no visual or auditory hallucination or suicidal or homicidal ideation.  SPEECH:There is no gross evidence of aphasia, dysarthria or agnosia.      CRANIAL NERVES:  Pupils are 4mm, equal round reactive to light, reacting briskly to 2mm without afferent pupillary defect.  Visual fields are intact to confrontation testing.  Funduscopic examination reveals sharp disc margins with normal vasculature.  No papilledema, hemorrhages or exudates.  Extraocular movements are full and smooth with normal pursuits and saccades.  No nystagmus noted.  The face is symmetric. Palate elevates symmetrically, Tongue midline, positive gag reflex. The remainder of the cranial nerves are intact and symmetrical.    MOTOR: Strength is 5/5 throughout with normal tone and bulk with the following exceptions, 4/5 intrinsic muscles of the hands and feet.  No involuntary movements noted.    Deep Tendon Reflexes: are 0/4 and symmetrical in the upper extremities, 0/4 and symmetrical at the knees and 0/4 and symmetrical at the Achilles tendon.  Plantar responses were down-going bilaterally.    SENSATION:  Intact to pinprick, light touch, vibration and proprioception.    COORDINATION AND GAIT:  The patient walks with a narrow-based gait.  Patient is able to heel-toe and tandem walk forward and backwards without difficulty.  Romberg and monopedal  Romberg are negative.    MUSCULOSKELETAL: Range of motion normal, no clubbing, cyanosis, or edema.  No joint swelling.            Records Reviewed: I have personally reviewed her previous medical record.    Ange was seen today for pseudotumor cerebri.    Diagnoses and all orders for this visit:    Pseudotumor cerebri syndrome    Blurry vision, bilateral      Discussion:  In summary,  38-year-old referred to [facility] neurology [facility] for evaluation of pseudotumor cerebri.  Interval history since last visit patient moved to [place] to be close to her family.  While she was in [place] she was seen by an ophthalmologist who reported that her eye finding and most consistent with pseudotumor cerebri.  Patient was seen by a neurologist in [place] because of the worsening of headache and blurry vision.  However the other neurologist did not feel that patient have pseudotumor cerebri and he is not going to pursue lumbar puncture.  Patient subsequently returned back to [place] for repeat lumbar puncture since her last lumbar puncture has benefited patient.    This Document is signed by Esa Miller MD, FAAN, FAASM April 10, 06429:14 PM

## 2019-04-29 ENCOUNTER — PROCEDURE VISIT (OUTPATIENT)
Dept: NEUROLOGY | Facility: CLINIC | Age: 39
End: 2019-04-29

## 2019-04-29 ENCOUNTER — LAB REQUISITION (OUTPATIENT)
Dept: LAB | Facility: HOSPITAL | Age: 39
End: 2019-04-29

## 2019-04-29 VITALS
HEART RATE: 72 BPM | DIASTOLIC BLOOD PRESSURE: 80 MMHG | WEIGHT: 203.8 LBS | SYSTOLIC BLOOD PRESSURE: 118 MMHG | HEIGHT: 64 IN | BODY MASS INDEX: 34.79 KG/M2 | OXYGEN SATURATION: 100 %

## 2019-04-29 DIAGNOSIS — G93.2 BENIGN INTRACRANIAL HYPERTENSION: ICD-10-CM

## 2019-04-29 DIAGNOSIS — G93.2 PSEUDOTUMOR CEREBRI: Primary | ICD-10-CM

## 2019-04-29 LAB
APPEARANCE CSF: CLEAR
COLOR CSF: COLORLESS
GLUCOSE CSF-MCNC: 50 MG/DL
GRAM STN SPEC: NORMAL
GRAM STN SPEC: NORMAL
PROT CSF-MCNC: 30 MG/DL
RBC # CSF MANUAL: 0 /MM3 (ref 0–0)
TUBE # CSF: 2
WBC # CSF MANUAL: 0 /MM3 (ref 0–5)

## 2019-04-29 PROCEDURE — 86592 SYPHILIS TEST NON-TREP QUAL: CPT | Performed by: PSYCHIATRY & NEUROLOGY

## 2019-04-29 PROCEDURE — 84157 ASSAY OF PROTEIN OTHER: CPT | Performed by: PSYCHIATRY & NEUROLOGY

## 2019-04-29 PROCEDURE — 89050 BODY FLUID CELL COUNT: CPT | Performed by: PSYCHIATRY & NEUROLOGY

## 2019-04-29 PROCEDURE — 62270 DX LMBR SPI PNXR: CPT | Performed by: PSYCHIATRY & NEUROLOGY

## 2019-04-29 PROCEDURE — 87205 SMEAR GRAM STAIN: CPT | Performed by: PSYCHIATRY & NEUROLOGY

## 2019-04-29 PROCEDURE — 82945 GLUCOSE OTHER FLUID: CPT | Performed by: PSYCHIATRY & NEUROLOGY

## 2019-04-29 NOTE — PROGRESS NOTES
Procedure notes    Procedure: Lumbar puncture    Indication: Pseudotumor cerebri    Procedure note  The indications of lumbar puncture, risk and benefit been explained to the patient and patient expressed understanding.  After landmark was located at L4-5, patient was prepped and draped in the usual sterile fashion. 3 mL of lidocaine was given to patient at L4-5 for local anesthesia. The spinal needle was introduced without difficulty.  8 cc of Clear CSF was collected and was sent to laboratory for evaluation. Patient tolerated the procedure well without complication.  Tested tube#1 CSF was sent for glucose and protein  Tested tube#2 CSF was sent for cell count and differential  Tested tube#3 CSF was sent for Gram stain, VDRL ,   Tested tube#4 CSF was sent for cytology    Esa Miller MD, FAAN  04/29/2019

## 2019-05-01 ENCOUNTER — PRIOR AUTHORIZATION (OUTPATIENT)
Dept: INTERNAL MEDICINE | Facility: CLINIC | Age: 39
End: 2019-05-01

## 2019-05-02 ENCOUNTER — TELEPHONE (OUTPATIENT)
Dept: NEUROLOGY | Facility: CLINIC | Age: 39
End: 2019-05-02

## 2019-05-02 LAB — REAGIN AB CSF QL: NON REACTIVE

## 2019-05-02 NOTE — TELEPHONE ENCOUNTER
Pt called office with c/o HA and vomiting since Monday following LP procedure.     Per Dr Miller- pt needs caffeine and fluids, 2L IV. Pt need to go to ER    Pt notified of this, verbalized understanding

## 2019-05-03 LAB
LAB AP CASE REPORT: NORMAL
PATH REPORT.FINAL DX SPEC: NORMAL

## 2019-05-10 ENCOUNTER — TELEPHONE (OUTPATIENT)
Dept: NEUROLOGY | Facility: CLINIC | Age: 39
End: 2019-05-10

## 2019-06-13 DIAGNOSIS — R11.0 NAUSEA: ICD-10-CM

## 2019-06-13 DIAGNOSIS — R10.13 EPIGASTRIC ABDOMINAL PAIN: ICD-10-CM

## 2019-06-13 RX ORDER — PROMETHAZINE HYDROCHLORIDE 25 MG/1
TABLET ORAL
Qty: 120 TABLET | Refills: 0 | Status: SHIPPED | OUTPATIENT
Start: 2019-06-13

## 2019-10-02 ENCOUNTER — TELEPHONE (OUTPATIENT)
Dept: INTERNAL MEDICINE | Facility: CLINIC | Age: 39
End: 2019-10-02

## 2019-10-02 DIAGNOSIS — G43.011 INTRACTABLE MIGRAINE WITHOUT AURA AND WITH STATUS MIGRAINOSUS: Primary | ICD-10-CM

## 2019-10-02 DIAGNOSIS — G93.2 PSEUDOTUMOR CEREBRI: ICD-10-CM

## 2019-10-02 NOTE — TELEPHONE ENCOUNTER
Patient left a voicemail on the referral line saying that she previously saw Dr. Miller before he left [facility]. She is wanting to see him now that he is at  and needs a referral to see him.

## 2019-10-07 ENCOUNTER — TELEPHONE (OUTPATIENT)
Dept: INTERNAL MEDICINE | Facility: CLINIC | Age: 39
End: 2019-10-07

## 2019-10-07 NOTE — TELEPHONE ENCOUNTER
Patient called requesting a referral to [facility] [phone] in [place], [place].     She needs the referral for Pseudotumor cerebri.    Patient had originally requested a referral to see Dr. Esa Miller at  in [place] to get the referral to neurosurgery. However, he will not be seeing patients in [place] for the first year.      Call back number: [phone]

## 2019-10-07 NOTE — TELEPHONE ENCOUNTER
Previous neurosurgery referral was used in 2018.     New order must be placed to send to different neurosurgeon.    Thanks!

## 2019-10-08 DIAGNOSIS — G93.2 PSEUDOTUMOR CEREBRI: Primary | ICD-10-CM

## 2019-10-29 NOTE — TELEPHONE ENCOUNTER
Dr. Correa,  Patient is scheduled with her requested Neurosurgery physician (in [place]) for next month.  Does she also need to go through with the referral to Neurology?  She is unable to get scheduled with Dr. Miller in [place] for right now, as he is practicing in [place] for the first year with .

## 2019-10-29 NOTE — TELEPHONE ENCOUNTER
She lives in [place] so seeing DR Miller in [facility] would be closer. Ok to get her in with him.

## 2019-10-29 NOTE — TELEPHONE ENCOUNTER
I called and spoke with the patient.  She states that she would like to wait until after her Neurosurgery appt next month (11/15) to schedule with Neurology.  She has the contact information for an office in [place] that she is interested in scheduling with.  She will call back with that information when she is ready to schedule with Neurology.

## 2025-01-30 NOTE — PATIENT INSTRUCTIONS
Patient has appt with Dr Stratton scheduled for Tuesday to establish PCP  Patient clinically looks good today however with persistent nausea and vomiting will recheck labs and CT scan today and call patient with results  She has zofran for prn use  
No

## (undated) DEVICE — ENDOGATOR AUXILIARY WATER JET CONNECTOR: Brand: ENDOGATOR

## (undated) DEVICE — MEDI-VAC NON-CONDUCTIVE SUCTION TUBING: Brand: CARDINAL HEALTH

## (undated) DEVICE — PROXIMATE SKIN STAPLERS (35 WIDE) CONTAINS 35 STAINLESS STEEL STAPLES (FIXED HEAD): Brand: PROXIMATE

## (undated) DEVICE — GLV SURG SENSICARE W/ALOE PF LF 7.5 STRL

## (undated) DEVICE — TRY SKINPREP PVP SCRB W PAINT

## (undated) DEVICE — FRCP BIOP COLD ENDOJAW ALLGTR W/NDL 2.8X2300MM BLU

## (undated) DEVICE — SUT SILK 2/0 SH CR8 18IN CR8 C012D

## (undated) DEVICE — DRSNG PAD ABD 8X10IN STRL

## (undated) DEVICE — THREE-QUARTER SHEET: Brand: CONVERTORS

## (undated) DEVICE — PAD GRND REM POLYHESIVE A/ DISP

## (undated) DEVICE — CUFF SCD HEMOFORCE SEQ CALF STD MD

## (undated) DEVICE — SPONGE,LAP,4"X18",XR,ST,5/PK,40PK/CS: Brand: MEDLINE INDUSTRIES, INC.

## (undated) DEVICE — TOWEL,OR,DSP,ST,BLUE,STD,4/PK,20PK/CS: Brand: MEDLINE

## (undated) DEVICE — Device

## (undated) DEVICE — SUT GUT CHROMIC 2 TO 0 CT NDL 36IN ET913H

## (undated) DEVICE — SYR LUER SLPTP 50ML

## (undated) DEVICE — SUT PDS 0 CT 36IN DYED Z358T

## (undated) DEVICE — TOTAL TRAY, 16FR 10ML SIL FOLEY, URN: Brand: MEDLINE

## (undated) DEVICE — VIOLET BRAIDED (POLYGLACTIN 910), SYNTHETIC ABSORBABLE SUTURE: Brand: COATED VICRYL

## (undated) DEVICE — SPNG GZ WOVN 4X4IN 12PLY 10/BX STRL

## (undated) DEVICE — ST IRR CYSTO W/SPK 77IN LF

## (undated) DEVICE — SUT VIC 2/0 SH 27IN

## (undated) DEVICE — JELLY,LUBE,STERILE,FLIP TOP,TUBE,2-OZ: Brand: MEDLINE

## (undated) DEVICE — GLV SURG TRIUMPH MICRO PF LTX 7.5 STRL

## (undated) DEVICE — SUT VIC 0 CT 36IN J958H

## (undated) DEVICE — TP SXN YANKR BULB STRL

## (undated) DEVICE — ADHESIVE ISLAND DRESSING: Brand: TELFA

## (undated) DEVICE — RICH MAJOR PROCEDURE: Brand: MEDLINE INDUSTRIES, INC.

## (undated) DEVICE — PLUG,CATHETER,DRAINAGE PROTECTOR,TUBE: Brand: MEDLINE

## (undated) DEVICE — PISTON SYRINGE,IRRIGATION WITH PROTECTIVE CAP: Brand: DOVER

## (undated) DEVICE — CONMED SCOPE SAVER BITE BLOCK, 20X27 MM: Brand: SCOPE SAVER

## (undated) DEVICE — SUT GUT CHRM 2/0 SH 27IN G123H